# Patient Record
Sex: FEMALE | Race: WHITE | NOT HISPANIC OR LATINO
[De-identification: names, ages, dates, MRNs, and addresses within clinical notes are randomized per-mention and may not be internally consistent; named-entity substitution may affect disease eponyms.]

---

## 2017-03-08 ENCOUNTER — APPOINTMENT (OUTPATIENT)
Dept: NEUROLOGY | Facility: CLINIC | Age: 67
End: 2017-03-08

## 2017-03-08 VITALS
TEMPERATURE: 98.1 F | HEART RATE: 85 BPM | OXYGEN SATURATION: 93 % | DIASTOLIC BLOOD PRESSURE: 80 MMHG | BODY MASS INDEX: 29.57 KG/M2 | WEIGHT: 184 LBS | HEIGHT: 66 IN | SYSTOLIC BLOOD PRESSURE: 134 MMHG

## 2017-03-09 ENCOUNTER — TRANSCRIPTION ENCOUNTER (OUTPATIENT)
Age: 67
End: 2017-03-09

## 2017-05-17 ENCOUNTER — MEDICATION RENEWAL (OUTPATIENT)
Age: 67
End: 2017-05-17

## 2017-05-22 ENCOUNTER — OUTPATIENT (OUTPATIENT)
Dept: OUTPATIENT SERVICES | Facility: HOSPITAL | Age: 67
LOS: 1 days | End: 2017-05-22
Payer: MEDICARE

## 2017-05-22 PROCEDURE — 93880 EXTRACRANIAL BILAT STUDY: CPT | Mod: 26

## 2017-05-22 PROCEDURE — 93880 EXTRACRANIAL BILAT STUDY: CPT

## 2017-06-14 ENCOUNTER — APPOINTMENT (OUTPATIENT)
Dept: NEUROLOGY | Facility: CLINIC | Age: 67
End: 2017-06-14

## 2017-06-14 VITALS
OXYGEN SATURATION: 95 % | WEIGHT: 171 LBS | BODY MASS INDEX: 27.48 KG/M2 | DIASTOLIC BLOOD PRESSURE: 78 MMHG | HEART RATE: 72 BPM | TEMPERATURE: 98.4 F | SYSTOLIC BLOOD PRESSURE: 154 MMHG | HEIGHT: 66 IN

## 2017-06-14 VITALS — DIASTOLIC BLOOD PRESSURE: 79 MMHG | SYSTOLIC BLOOD PRESSURE: 126 MMHG

## 2017-06-14 DIAGNOSIS — M25.521 PAIN IN RIGHT ELBOW: ICD-10-CM

## 2017-08-10 ENCOUNTER — OUTPATIENT (OUTPATIENT)
Dept: OUTPATIENT SERVICES | Facility: HOSPITAL | Age: 67
LOS: 1 days | End: 2017-08-10
Payer: MEDICARE

## 2017-08-10 PROCEDURE — 93880 EXTRACRANIAL BILAT STUDY: CPT | Mod: 26

## 2017-08-10 PROCEDURE — 93880 EXTRACRANIAL BILAT STUDY: CPT

## 2017-08-22 ENCOUNTER — APPOINTMENT (OUTPATIENT)
Dept: NEUROLOGY | Facility: CLINIC | Age: 67
End: 2017-08-22

## 2017-09-07 ENCOUNTER — MEDICATION RENEWAL (OUTPATIENT)
Age: 67
End: 2017-09-07

## 2017-09-20 ENCOUNTER — APPOINTMENT (OUTPATIENT)
Dept: NEUROLOGY | Facility: CLINIC | Age: 67
End: 2017-09-20

## 2017-09-25 ENCOUNTER — APPOINTMENT (OUTPATIENT)
Dept: HEART AND VASCULAR | Facility: CLINIC | Age: 67
End: 2017-09-25
Payer: MEDICARE

## 2017-09-25 ENCOUNTER — MEDICATION RENEWAL (OUTPATIENT)
Age: 67
End: 2017-09-25

## 2017-09-25 VITALS
TEMPERATURE: 98.4 F | HEIGHT: 66 IN | OXYGEN SATURATION: 96 % | WEIGHT: 171.98 LBS | SYSTOLIC BLOOD PRESSURE: 134 MMHG | DIASTOLIC BLOOD PRESSURE: 76 MMHG | HEART RATE: 72 BPM | BODY MASS INDEX: 27.64 KG/M2

## 2017-09-25 DIAGNOSIS — M79.661 PAIN IN RIGHT LOWER LEG: ICD-10-CM

## 2017-09-25 PROCEDURE — 99213 OFFICE O/P EST LOW 20 MIN: CPT | Mod: 25

## 2017-09-30 ENCOUNTER — FORM ENCOUNTER (OUTPATIENT)
Age: 67
End: 2017-09-30

## 2017-10-01 ENCOUNTER — OUTPATIENT (OUTPATIENT)
Dept: OUTPATIENT SERVICES | Facility: HOSPITAL | Age: 67
LOS: 1 days | End: 2017-10-01
Payer: MEDICARE
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
## 2017-10-01 DIAGNOSIS — M79.661 PAIN IN RIGHT LOWER LEG: ICD-10-CM

## 2017-10-01 DIAGNOSIS — M79.662 PAIN IN LEFT LOWER LEG: ICD-10-CM

## 2017-10-01 PROCEDURE — 93970 EXTREMITY STUDY: CPT | Mod: 26

## 2017-10-01 PROCEDURE — 93970 EXTREMITY STUDY: CPT

## 2017-11-28 ENCOUNTER — APPOINTMENT (OUTPATIENT)
Dept: NEUROLOGY | Facility: CLINIC | Age: 67
End: 2017-11-28
Payer: MEDICARE

## 2017-11-28 VITALS
HEART RATE: 74 BPM | OXYGEN SATURATION: 95 % | HEIGHT: 66 IN | SYSTOLIC BLOOD PRESSURE: 149 MMHG | DIASTOLIC BLOOD PRESSURE: 66 MMHG | BODY MASS INDEX: 28.12 KG/M2 | WEIGHT: 175 LBS

## 2017-11-28 PROCEDURE — 99215 OFFICE O/P EST HI 40 MIN: CPT

## 2017-11-28 RX ORDER — GABAPENTIN 300 MG/1
300 CAPSULE ORAL TWICE DAILY
Qty: 60 | Refills: 1 | Status: DISCONTINUED | COMMUNITY
Start: 2017-06-14 | End: 2017-11-28

## 2017-12-18 ENCOUNTER — OUTPATIENT (OUTPATIENT)
Dept: OUTPATIENT SERVICES | Facility: HOSPITAL | Age: 67
LOS: 1 days | End: 2017-12-18
Payer: MEDICARE

## 2017-12-18 PROCEDURE — 93880 EXTRACRANIAL BILAT STUDY: CPT

## 2017-12-18 PROCEDURE — 93880 EXTRACRANIAL BILAT STUDY: CPT | Mod: 26

## 2018-02-16 ENCOUNTER — APPOINTMENT (OUTPATIENT)
Dept: PULMONOLOGY | Facility: CLINIC | Age: 68
End: 2018-02-16

## 2018-03-06 ENCOUNTER — APPOINTMENT (OUTPATIENT)
Dept: HEART AND VASCULAR | Facility: CLINIC | Age: 68
End: 2018-03-06
Payer: MEDICARE

## 2018-03-06 VITALS
HEIGHT: 66 IN | WEIGHT: 176 LBS | BODY MASS INDEX: 28.28 KG/M2 | SYSTOLIC BLOOD PRESSURE: 120 MMHG | TEMPERATURE: 97.8 F | HEART RATE: 78 BPM | OXYGEN SATURATION: 98 % | DIASTOLIC BLOOD PRESSURE: 50 MMHG

## 2018-03-06 PROCEDURE — 93000 ELECTROCARDIOGRAM COMPLETE: CPT

## 2018-03-06 PROCEDURE — 99214 OFFICE O/P EST MOD 30 MIN: CPT | Mod: 25

## 2018-03-06 RX ORDER — GABAPENTIN 300 MG/1
300 CAPSULE ORAL 3 TIMES DAILY
Qty: 270 | Refills: 1 | Status: DISCONTINUED | COMMUNITY
Start: 2017-11-28 | End: 2018-03-06

## 2018-03-29 ENCOUNTER — APPOINTMENT (OUTPATIENT)
Dept: NEUROLOGY | Facility: CLINIC | Age: 68
End: 2018-03-29

## 2018-04-02 ENCOUNTER — APPOINTMENT (OUTPATIENT)
Dept: INTERNAL MEDICINE | Facility: CLINIC | Age: 68
End: 2018-04-02
Payer: MEDICARE

## 2018-04-02 VITALS
WEIGHT: 176 LBS | SYSTOLIC BLOOD PRESSURE: 137 MMHG | HEART RATE: 80 BPM | OXYGEN SATURATION: 97 % | DIASTOLIC BLOOD PRESSURE: 84 MMHG | BODY MASS INDEX: 28.28 KG/M2 | TEMPERATURE: 98 F | HEIGHT: 66 IN

## 2018-04-02 DIAGNOSIS — Z78.9 OTHER SPECIFIED HEALTH STATUS: ICD-10-CM

## 2018-04-02 DIAGNOSIS — Z87.891 PERSONAL HISTORY OF NICOTINE DEPENDENCE: ICD-10-CM

## 2018-04-02 DIAGNOSIS — Z12.83 ENCOUNTER FOR SCREENING FOR MALIGNANT NEOPLASM OF SKIN: ICD-10-CM

## 2018-04-02 DIAGNOSIS — Z80.3 FAMILY HISTORY OF MALIGNANT NEOPLASM OF BREAST: ICD-10-CM

## 2018-04-02 DIAGNOSIS — Z80.0 FAMILY HISTORY OF MALIGNANT NEOPLASM OF DIGESTIVE ORGANS: ICD-10-CM

## 2018-04-02 DIAGNOSIS — Z11.3 ENCOUNTER FOR SCREENING FOR INFECTIONS WITH A PREDOMINANTLY SEXUAL MODE OF TRANSMISSION: ICD-10-CM

## 2018-04-02 PROCEDURE — 36415 COLL VENOUS BLD VENIPUNCTURE: CPT

## 2018-04-02 PROCEDURE — 99204 OFFICE O/P NEW MOD 45 MIN: CPT | Mod: 25

## 2018-04-02 PROCEDURE — G0444 DEPRESSION SCREEN ANNUAL: CPT | Mod: 59

## 2018-04-02 PROCEDURE — 99406 BEHAV CHNG SMOKING 3-10 MIN: CPT

## 2018-04-03 LAB
ALBUMIN SERPL ELPH-MCNC: 4.4 G/DL
ALP BLD-CCNC: 73 U/L
ALT SERPL-CCNC: 18 U/L
ANION GAP SERPL CALC-SCNC: 16 MMOL/L
AST SERPL-CCNC: 20 U/L
BASOPHILS # BLD AUTO: 0.03 K/UL
BASOPHILS NFR BLD AUTO: 0.4 %
BILIRUB SERPL-MCNC: 0.8 MG/DL
BUN SERPL-MCNC: 14 MG/DL
CALCIUM SERPL-MCNC: 10 MG/DL
CHLORIDE SERPL-SCNC: 96 MMOL/L
CHOLEST SERPL-MCNC: 156 MG/DL
CHOLEST/HDLC SERPL: 3 RATIO
CO2 SERPL-SCNC: 28 MMOL/L
CREAT SERPL-MCNC: 0.54 MG/DL
EOSINOPHIL # BLD AUTO: 0.12 K/UL
EOSINOPHIL NFR BLD AUTO: 1.4 %
FOLATE SERPL-MCNC: 18.1 NG/ML
GLUCOSE SERPL-MCNC: 89 MG/DL
HAV IGG+IGM SER QL: NONREACTIVE
HBA1C MFR BLD HPLC: 5.6 %
HBV CORE IGG+IGM SER QL: NONREACTIVE
HBV SURFACE AB SER QL: NONREACTIVE
HBV SURFACE AG SER QL: NONREACTIVE
HCT VFR BLD CALC: 45.6 %
HCV AB SER QL: NONREACTIVE
HCV S/CO RATIO: 0.16 S/CO
HDLC SERPL-MCNC: 52 MG/DL
HGB BLD-MCNC: 14.8 G/DL
HIV1+2 AB SPEC QL IA.RAPID: NONREACTIVE
IMM GRANULOCYTES NFR BLD AUTO: 0.1 %
LDLC SERPL CALC-MCNC: 86 MG/DL
LYMPHOCYTES # BLD AUTO: 2.29 K/UL
LYMPHOCYTES NFR BLD AUTO: 27.5 %
MAN DIFF?: NORMAL
MCHC RBC-ENTMCNC: 29.4 PG
MCHC RBC-ENTMCNC: 32.5 GM/DL
MCV RBC AUTO: 90.5 FL
MONOCYTES # BLD AUTO: 0.45 K/UL
MONOCYTES NFR BLD AUTO: 5.4 %
NEUTROPHILS # BLD AUTO: 5.43 K/UL
NEUTROPHILS NFR BLD AUTO: 65.2 %
PLATELET # BLD AUTO: 246 K/UL
POTASSIUM SERPL-SCNC: 4.2 MMOL/L
PROT SERPL-MCNC: 7.4 G/DL
RBC # BLD: 5.04 M/UL
RBC # FLD: 15.6 %
SODIUM SERPL-SCNC: 140 MMOL/L
T PALLIDUM AB SER QL IA: NEGATIVE
TRIGL SERPL-MCNC: 92 MG/DL
TSH SERPL-ACNC: 2.17 UIU/ML
VIT B12 SERPL-MCNC: 536 PG/ML
WBC # FLD AUTO: 8.33 K/UL

## 2018-04-17 ENCOUNTER — RX RENEWAL (OUTPATIENT)
Age: 68
End: 2018-04-17

## 2018-04-18 ENCOUNTER — APPOINTMENT (OUTPATIENT)
Dept: NEUROLOGY | Facility: CLINIC | Age: 68
End: 2018-04-18

## 2018-05-07 ENCOUNTER — APPOINTMENT (OUTPATIENT)
Dept: INTERNAL MEDICINE | Facility: CLINIC | Age: 68
End: 2018-05-07
Payer: MEDICARE

## 2018-05-07 VITALS
BODY MASS INDEX: 28.28 KG/M2 | DIASTOLIC BLOOD PRESSURE: 75 MMHG | RESPIRATION RATE: 15 BRPM | TEMPERATURE: 97.7 F | HEIGHT: 66 IN | SYSTOLIC BLOOD PRESSURE: 141 MMHG | HEART RATE: 76 BPM | OXYGEN SATURATION: 98 % | WEIGHT: 176 LBS

## 2018-05-07 PROCEDURE — 99214 OFFICE O/P EST MOD 30 MIN: CPT | Mod: 25

## 2018-05-07 PROCEDURE — 99406 BEHAV CHNG SMOKING 3-10 MIN: CPT

## 2018-05-07 RX ORDER — LORAZEPAM 1 MG/1
1 TABLET ORAL
Refills: 0 | Status: DISCONTINUED | COMMUNITY
End: 2018-05-07

## 2018-05-08 LAB
CREAT SPEC-SCNC: 78 MG/DL
MICROALBUMIN 24H UR DL<=1MG/L-MCNC: 0.6 MG/DL
MICROALBUMIN/CREAT 24H UR-RTO: 8 MG/G

## 2018-05-12 ENCOUNTER — MOBILE ON CALL (OUTPATIENT)
Age: 68
End: 2018-05-12

## 2018-06-20 ENCOUNTER — APPOINTMENT (OUTPATIENT)
Dept: HEART AND VASCULAR | Facility: CLINIC | Age: 68
End: 2018-06-20
Payer: MEDICARE

## 2018-06-20 VITALS
HEART RATE: 75 BPM | OXYGEN SATURATION: 97 % | TEMPERATURE: 97.2 F | BODY MASS INDEX: 28.28 KG/M2 | WEIGHT: 176 LBS | DIASTOLIC BLOOD PRESSURE: 80 MMHG | SYSTOLIC BLOOD PRESSURE: 140 MMHG | HEIGHT: 66 IN

## 2018-06-20 DIAGNOSIS — R00.2 PALPITATIONS: ICD-10-CM

## 2018-06-20 PROCEDURE — 99214 OFFICE O/P EST MOD 30 MIN: CPT | Mod: 25

## 2018-06-20 PROCEDURE — 93000 ELECTROCARDIOGRAM COMPLETE: CPT

## 2018-06-20 RX ORDER — BEVACIZUMAB 100 MG/4ML
100 INJECTION, SOLUTION INTRAVENOUS
Refills: 0 | Status: DISCONTINUED | COMMUNITY
End: 2018-06-20

## 2018-06-20 RX ORDER — LOSARTAN POTASSIUM 25 MG/1
25 TABLET, FILM COATED ORAL
Qty: 30 | Refills: 0 | Status: DISCONTINUED | COMMUNITY
Start: 2017-12-12 | End: 2018-06-20

## 2018-06-20 RX ORDER — LOSARTAN POTASSIUM AND HYDROCHLOROTHIAZIDE 12.5; 5 MG/1; MG/1
50-12.5 TABLET ORAL
Qty: 90 | Refills: 0 | Status: DISCONTINUED | COMMUNITY
Start: 2018-02-01 | End: 2018-06-20

## 2018-06-21 ENCOUNTER — RX RENEWAL (OUTPATIENT)
Age: 68
End: 2018-06-21

## 2018-06-27 ENCOUNTER — APPOINTMENT (OUTPATIENT)
Dept: HEART AND VASCULAR | Facility: CLINIC | Age: 68
End: 2018-06-27

## 2018-07-02 ENCOUNTER — APPOINTMENT (OUTPATIENT)
Dept: HEART AND VASCULAR | Facility: CLINIC | Age: 68
End: 2018-07-02

## 2018-07-23 ENCOUNTER — APPOINTMENT (OUTPATIENT)
Dept: HEART AND VASCULAR | Facility: CLINIC | Age: 68
End: 2018-07-23
Payer: MEDICARE

## 2018-07-23 PROCEDURE — 93306 TTE W/DOPPLER COMPLETE: CPT

## 2018-08-08 ENCOUNTER — APPOINTMENT (OUTPATIENT)
Dept: HEART AND VASCULAR | Facility: CLINIC | Age: 68
End: 2018-08-08
Payer: MEDICARE

## 2018-08-08 VITALS
WEIGHT: 176 LBS | BODY MASS INDEX: 28.28 KG/M2 | HEART RATE: 75 BPM | SYSTOLIC BLOOD PRESSURE: 130 MMHG | DIASTOLIC BLOOD PRESSURE: 60 MMHG | TEMPERATURE: 98.4 F | HEIGHT: 66 IN | OXYGEN SATURATION: 96 %

## 2018-08-08 PROCEDURE — 99214 OFFICE O/P EST MOD 30 MIN: CPT | Mod: 25

## 2018-08-08 PROCEDURE — 36415 COLL VENOUS BLD VENIPUNCTURE: CPT

## 2018-08-27 ENCOUNTER — APPOINTMENT (OUTPATIENT)
Dept: NEUROLOGY | Facility: CLINIC | Age: 68
End: 2018-08-27
Payer: MEDICARE

## 2018-08-27 VITALS
TEMPERATURE: 98.6 F | DIASTOLIC BLOOD PRESSURE: 76 MMHG | BODY MASS INDEX: 28.12 KG/M2 | HEART RATE: 69 BPM | WEIGHT: 175 LBS | HEIGHT: 66 IN | OXYGEN SATURATION: 94 % | SYSTOLIC BLOOD PRESSURE: 132 MMHG

## 2018-08-27 PROCEDURE — 99214 OFFICE O/P EST MOD 30 MIN: CPT

## 2018-09-14 ENCOUNTER — APPOINTMENT (OUTPATIENT)
Dept: OTOLARYNGOLOGY | Facility: CLINIC | Age: 68
End: 2018-09-14
Payer: MEDICARE

## 2018-09-14 VITALS
HEART RATE: 73 BPM | HEIGHT: 66 IN | TEMPERATURE: 98.4 F | SYSTOLIC BLOOD PRESSURE: 143 MMHG | DIASTOLIC BLOOD PRESSURE: 85 MMHG | BODY MASS INDEX: 27.97 KG/M2 | WEIGHT: 174 LBS | OXYGEN SATURATION: 98 %

## 2018-09-14 DIAGNOSIS — H91.93 UNSPECIFIED HEARING LOSS, BILATERAL: ICD-10-CM

## 2018-09-14 PROCEDURE — 92550 TYMPANOMETRY & REFLEX THRESH: CPT

## 2018-09-14 PROCEDURE — 92557 COMPREHENSIVE HEARING TEST: CPT

## 2018-09-14 PROCEDURE — 99203 OFFICE O/P NEW LOW 30 MIN: CPT

## 2018-09-17 ENCOUNTER — LABORATORY RESULT (OUTPATIENT)
Age: 68
End: 2018-09-17

## 2018-09-17 ENCOUNTER — APPOINTMENT (OUTPATIENT)
Dept: DERMATOLOGY | Facility: CLINIC | Age: 68
End: 2018-09-17
Payer: MEDICARE

## 2018-09-17 VITALS
WEIGHT: 179 LBS | BODY MASS INDEX: 28.77 KG/M2 | DIASTOLIC BLOOD PRESSURE: 76 MMHG | HEIGHT: 66 IN | SYSTOLIC BLOOD PRESSURE: 155 MMHG

## 2018-09-17 DIAGNOSIS — Z80.8 FAMILY HISTORY OF MALIGNANT NEOPLASM OF OTHER ORGANS OR SYSTEMS: ICD-10-CM

## 2018-09-17 DIAGNOSIS — L82.1 OTHER SEBORRHEIC KERATOSIS: ICD-10-CM

## 2018-09-17 DIAGNOSIS — Z12.83 ENCOUNTER FOR SCREENING FOR MALIGNANT NEOPLASM OF SKIN: ICD-10-CM

## 2018-09-17 DIAGNOSIS — Z86.79 PERSONAL HISTORY OF OTHER DISEASES OF THE CIRCULATORY SYSTEM: ICD-10-CM

## 2018-09-17 DIAGNOSIS — Z85.828 PERSONAL HISTORY OF OTHER MALIGNANT NEOPLASM OF SKIN: ICD-10-CM

## 2018-09-17 DIAGNOSIS — D22.9 MELANOCYTIC NEVI, UNSPECIFIED: ICD-10-CM

## 2018-09-17 DIAGNOSIS — Z87.39 PERSONAL HISTORY OF OTHER DISEASES OF THE MUSCULOSKELETAL SYSTEM AND CONNECTIVE TISSUE: ICD-10-CM

## 2018-09-17 DIAGNOSIS — D48.5 NEOPLASM OF UNCERTAIN BEHAVIOR OF SKIN: ICD-10-CM

## 2018-09-17 DIAGNOSIS — H91.90 UNSPECIFIED HEARING LOSS, UNSPECIFIED EAR: ICD-10-CM

## 2018-09-17 DIAGNOSIS — H54.7 UNSPECIFIED VISUAL LOSS: ICD-10-CM

## 2018-09-17 DIAGNOSIS — Z80.9 FAMILY HISTORY OF MALIGNANT NEOPLASM, UNSPECIFIED: ICD-10-CM

## 2018-09-17 PROCEDURE — 11100 BX SKIN SUBCUTANEOUS&/MUCOUS MEMBRANE 1 LESION: CPT

## 2018-09-17 PROCEDURE — 99204 OFFICE O/P NEW MOD 45 MIN: CPT

## 2018-09-17 PROCEDURE — 11101 BIOPSY SKIN SUBQ&/MUCOUS MEMBRANE EA ADDL LESN: CPT

## 2018-10-03 ENCOUNTER — APPOINTMENT (OUTPATIENT)
Dept: HEART AND VASCULAR | Facility: CLINIC | Age: 68
End: 2018-10-03
Payer: MEDICARE

## 2018-10-03 VITALS
OXYGEN SATURATION: 97 % | HEIGHT: 66 IN | SYSTOLIC BLOOD PRESSURE: 136 MMHG | BODY MASS INDEX: 28.93 KG/M2 | WEIGHT: 179.99 LBS | DIASTOLIC BLOOD PRESSURE: 72 MMHG | TEMPERATURE: 97.8 F | HEART RATE: 85 BPM

## 2018-10-03 DIAGNOSIS — Z86.79 PERSONAL HISTORY OF OTHER DISEASES OF THE CIRCULATORY SYSTEM: ICD-10-CM

## 2018-10-03 PROCEDURE — 99214 OFFICE O/P EST MOD 30 MIN: CPT

## 2018-10-03 PROCEDURE — 93000 ELECTROCARDIOGRAM COMPLETE: CPT

## 2018-10-03 RX ORDER — BUPROPION HYDROCHLORIDE 150 MG/1
150 TABLET, FILM COATED, EXTENDED RELEASE ORAL
Qty: 90 | Refills: 3 | Status: DISCONTINUED | COMMUNITY
Start: 2018-06-20 | End: 2018-10-03

## 2018-10-15 ENCOUNTER — APPOINTMENT (OUTPATIENT)
Dept: INTERNAL MEDICINE | Facility: CLINIC | Age: 68
End: 2018-10-15
Payer: MEDICARE

## 2018-10-15 VITALS
BODY MASS INDEX: 28.77 KG/M2 | WEIGHT: 179 LBS | DIASTOLIC BLOOD PRESSURE: 66 MMHG | TEMPERATURE: 97.8 F | SYSTOLIC BLOOD PRESSURE: 118 MMHG | HEIGHT: 66 IN | HEART RATE: 77 BPM | OXYGEN SATURATION: 95 %

## 2018-10-15 DIAGNOSIS — Z72.0 TOBACCO USE: ICD-10-CM

## 2018-10-15 PROCEDURE — 99406 BEHAV CHNG SMOKING 3-10 MIN: CPT

## 2018-10-15 PROCEDURE — 99214 OFFICE O/P EST MOD 30 MIN: CPT | Mod: 25

## 2018-11-04 ENCOUNTER — RX RENEWAL (OUTPATIENT)
Age: 68
End: 2018-11-04

## 2018-11-05 ENCOUNTER — RX RENEWAL (OUTPATIENT)
Age: 68
End: 2018-11-05

## 2018-11-25 ENCOUNTER — FORM ENCOUNTER (OUTPATIENT)
Age: 68
End: 2018-11-25

## 2018-11-26 ENCOUNTER — APPOINTMENT (OUTPATIENT)
Dept: ULTRASOUND IMAGING | Facility: HOSPITAL | Age: 68
End: 2018-11-26
Payer: MEDICARE

## 2018-11-26 ENCOUNTER — OUTPATIENT (OUTPATIENT)
Dept: OUTPATIENT SERVICES | Facility: HOSPITAL | Age: 68
LOS: 1 days | End: 2018-11-26
Payer: MEDICARE

## 2018-11-26 PROCEDURE — 93880 EXTRACRANIAL BILAT STUDY: CPT

## 2018-11-26 PROCEDURE — 93880 EXTRACRANIAL BILAT STUDY: CPT | Mod: 26

## 2018-12-06 ENCOUNTER — APPOINTMENT (OUTPATIENT)
Dept: INTERNAL MEDICINE | Facility: CLINIC | Age: 68
End: 2018-12-06

## 2019-01-02 ENCOUNTER — RX RENEWAL (OUTPATIENT)
Age: 69
End: 2019-01-02

## 2019-01-03 ENCOUNTER — MEDICATION RENEWAL (OUTPATIENT)
Age: 69
End: 2019-01-03

## 2019-01-04 ENCOUNTER — MEDICATION RENEWAL (OUTPATIENT)
Age: 69
End: 2019-01-04

## 2019-01-04 ENCOUNTER — RX RENEWAL (OUTPATIENT)
Age: 69
End: 2019-01-04

## 2019-01-07 ENCOUNTER — APPOINTMENT (OUTPATIENT)
Dept: INTERNAL MEDICINE | Facility: CLINIC | Age: 69
End: 2019-01-07
Payer: MEDICARE

## 2019-01-07 VITALS
TEMPERATURE: 97.6 F | HEART RATE: 89 BPM | SYSTOLIC BLOOD PRESSURE: 150 MMHG | BODY MASS INDEX: 29.25 KG/M2 | HEIGHT: 66 IN | OXYGEN SATURATION: 96 % | WEIGHT: 182 LBS | DIASTOLIC BLOOD PRESSURE: 75 MMHG

## 2019-01-07 VITALS — DIASTOLIC BLOOD PRESSURE: 74 MMHG | SYSTOLIC BLOOD PRESSURE: 152 MMHG

## 2019-01-07 DIAGNOSIS — Z12.2 ENCOUNTER FOR SCREENING FOR MALIGNANT NEOPLASM OF RESPIRATORY ORGANS: ICD-10-CM

## 2019-01-07 DIAGNOSIS — Z23 ENCOUNTER FOR IMMUNIZATION: ICD-10-CM

## 2019-01-07 DIAGNOSIS — Z13.31 ENCOUNTER FOR SCREENING FOR DEPRESSION: ICD-10-CM

## 2019-01-07 PROCEDURE — 36415 COLL VENOUS BLD VENIPUNCTURE: CPT

## 2019-01-07 PROCEDURE — G0444 DEPRESSION SCREEN ANNUAL: CPT | Mod: 59

## 2019-01-07 PROCEDURE — 99214 OFFICE O/P EST MOD 30 MIN: CPT | Mod: 25

## 2019-01-07 PROCEDURE — 90670 PCV13 VACCINE IM: CPT

## 2019-01-07 PROCEDURE — G0009: CPT

## 2019-01-07 RX ORDER — LORAZEPAM 1 MG/1
1 TABLET ORAL
Qty: 30 | Refills: 0 | Status: DISCONTINUED | COMMUNITY
Start: 2018-04-17 | End: 2019-01-07

## 2019-01-08 ENCOUNTER — TRANSCRIPTION ENCOUNTER (OUTPATIENT)
Age: 69
End: 2019-01-08

## 2019-01-08 LAB
ALBUMIN SERPL ELPH-MCNC: 4.4 G/DL
ALP BLD-CCNC: 82 U/L
ALT SERPL-CCNC: 19 U/L
ANION GAP SERPL CALC-SCNC: 13 MMOL/L
AST SERPL-CCNC: 15 U/L
BASOPHILS # BLD AUTO: 0.02 K/UL
BASOPHILS NFR BLD AUTO: 0.2 %
BILIRUB SERPL-MCNC: 0.7 MG/DL
BUN SERPL-MCNC: 14 MG/DL
CALCIUM SERPL-MCNC: 10 MG/DL
CHLORIDE SERPL-SCNC: 102 MMOL/L
CO2 SERPL-SCNC: 27 MMOL/L
CREAT SERPL-MCNC: 0.64 MG/DL
EOSINOPHIL # BLD AUTO: 0.15 K/UL
EOSINOPHIL NFR BLD AUTO: 1.8 %
GLUCOSE SERPL-MCNC: 181 MG/DL
HCT VFR BLD CALC: 46.4 %
HGB BLD-MCNC: 14.2 G/DL
IMM GRANULOCYTES NFR BLD AUTO: 0.2 %
LYMPHOCYTES # BLD AUTO: 1.71 K/UL
LYMPHOCYTES NFR BLD AUTO: 21 %
MAN DIFF?: NORMAL
MCHC RBC-ENTMCNC: 28.7 PG
MCHC RBC-ENTMCNC: 30.6 GM/DL
MCV RBC AUTO: 93.9 FL
MONOCYTES # BLD AUTO: 0.28 K/UL
MONOCYTES NFR BLD AUTO: 3.4 %
NEUTROPHILS # BLD AUTO: 5.96 K/UL
NEUTROPHILS NFR BLD AUTO: 73.4 %
PLATELET # BLD AUTO: 245 K/UL
POTASSIUM SERPL-SCNC: 3.9 MMOL/L
PROT SERPL-MCNC: 6.8 G/DL
RBC # BLD: 4.94 M/UL
RBC # FLD: 15.8 %
SODIUM SERPL-SCNC: 142 MMOL/L
WBC # FLD AUTO: 8.14 K/UL

## 2019-01-15 ENCOUNTER — LABORATORY RESULT (OUTPATIENT)
Age: 69
End: 2019-01-15

## 2019-01-15 ENCOUNTER — APPOINTMENT (OUTPATIENT)
Dept: NEUROLOGY | Facility: CLINIC | Age: 69
End: 2019-01-15
Payer: MEDICARE

## 2019-01-15 VITALS
SYSTOLIC BLOOD PRESSURE: 164 MMHG | HEIGHT: 66 IN | WEIGHT: 182 LBS | HEART RATE: 72 BPM | OXYGEN SATURATION: 95 % | BODY MASS INDEX: 29.25 KG/M2 | TEMPERATURE: 98.4 F | DIASTOLIC BLOOD PRESSURE: 89 MMHG

## 2019-01-15 DIAGNOSIS — M54.2 CERVICALGIA: ICD-10-CM

## 2019-01-15 PROCEDURE — 99214 OFFICE O/P EST MOD 30 MIN: CPT

## 2019-01-15 RX ORDER — NICOTINE 21 MG/24HR
14 PATCH, TRANSDERMAL 24 HOURS TRANSDERMAL
Qty: 42 | Refills: 0 | Status: DISCONTINUED | COMMUNITY
Start: 2018-06-20 | End: 2019-01-15

## 2019-01-15 RX ORDER — ALBUTEROL SULFATE 90 UG/1
108 (90 BASE) AEROSOL, METERED RESPIRATORY (INHALATION)
Qty: 1 | Refills: 2 | Status: DISCONTINUED | COMMUNITY
Start: 2018-10-15 | End: 2019-01-15

## 2019-01-15 NOTE — PHYSICAL EXAM
[FreeTextEntry1] : General: sitting on exam table comfortably, NAD\par Neck: flexion/extension 5/5,  reproducible pain on palpation of back of her neck, decreased ROM of her neck (can't extend past midline due to pain)\par CV: RRR\par Extremities: FROMx4 including both shoulders, 2+ radial pulses bilaterally\par 	\par Neurological exam:\par Mental status: AA&O x 3, fluent, no dysarthria, follows all commands, able give full history of present and past events, normal attention span\par Cranial nerves: EOMI, VFF, no nystagmus, facial sensation intact, no facial droop, shoulder shrug intact bilaterally, tongue midline\par Motor: No pronator drift, 5/5 x4 except for right hand  4+/5 though interossei and fine finger movements 5/5, normal bulk and tone\par Sensory: Intact light touch bilaterally \par Reflexes: right 3+, left 2++\par Cerebellar: FNF intact bilaterally\par Gait: steady

## 2019-01-15 NOTE — ASSESSMENT
[FreeTextEntry1] : 68 year-old female with PMH HTN, prediabetes presents for follow up for gait instability secondary to left vertebral dissection and neck pain.  \par - Her gait has improved.  She has some worsening of her stroke risk factors.\par - The neck pain with associated left arm pain has worsened intermittently since her last visit.    \par \par Plan for left cerebellar stroke secondary to left vertebral dissection:\par 1) Secondary stroke prevention - needs better control\par a) Continue Plavix 75mg for antiplatelet with addition of Aspirin given worsening carotid stenosis.  \par - Ordered accumetrics to check that Plavix is in therapeutic range.\par b) Continue Atorvastatin 40mg for carotid stenosis, though may need to consider increasing the dose depending on repeat lipid profile.  LDL goal is less than 70 given her risk factors.\par \par 2) Risk factor management - \par a) HTN - needs better control.  Suggested that she have the Nurse at gym measure her blood pressure at least once a week for review by Dr. Reyes, her Cardiologist.\par b) Hyperlipidemia - see above\par c) Prediabetes - ordered repeat hemoglobin A1C for comparison.\par d) Carotid stenosis - worsening left ICA stenosis so needs maximal medical care with Aspirin, Plavix and Atorvastatin.\par e) Smoking - Encouraged continued smoking cessation. \par \par Plan for Neck pain - components of cervical radiculopathy/cervicogenic headaches.\par a) Agree with referral to pain management though patient concerned about epidural injection in cervical region.\par b) Prescribed muscle relaxant, Tizanidine 2mg daily for initial treatment.\par c) Encouraged her to exercise more to increase mobility of her neck muscles.

## 2019-01-15 NOTE — REVIEW OF SYSTEMS
[Anxiety] : anxiety [As Noted in HPI] : as noted in HPI [Negative] : Heme/Lymph [de-identified] : Restless legs at night

## 2019-01-15 NOTE — DATA REVIEWED
[de-identified] : Carotid dopplers (11/26/2018):\par 1. Since 12/18/2017, now abnormal peak systolic and end-diastolic velocities within the LEFT internal carotid artery consistent with 50-69% stenosis of the normal luminal diameter.\par 2. There is again evidence of occlusion of distal portion of left vertebral artery.\par  [de-identified] : Labs (1/7/2019): \par CBC, CMP - WNL except glucose 181

## 2019-01-15 NOTE — HISTORY OF PRESENT ILLNESS
[FreeTextEntry1] : 68 year-old female presents for follow up for stroke secondary to left vertebral dissection and neck pain. No new weakness, numbness, speech or visual deficits.    \par \par Plavix - No bleeding in urine or stool.\par Atorvastatin - No muscle cramps or pains.  \par \par Stroke risk factors - \par HTN - Blood pressure elevated today.  She's not measuring her blood pressure anymore since doesn't have confidence in her machine.  She follows with Dr. Reyes, Cardiology, next month.\par Hyperlipidemia - see above\par Prediabetes - She joined a gym to lose weight.  She's concerned that her glucose on recent testing was high.\par Carotid stenosis - repeat carotid dopplers in November\par Smoking - 3 days off cigarettes - She stopped cold.  She thinks that she feels depressed in the past 3 days.\par \par Neck pain - She's still getting headaches and neck pain.  She had severe pain around left shoulder and arm around Thanksgiving.  Her PMD referred her to pain management.\par - She tried the Gabapentin for longer period but discontinued due to feeling of whoozy.

## 2019-01-24 ENCOUNTER — FORM ENCOUNTER (OUTPATIENT)
Age: 69
End: 2019-01-24

## 2019-01-25 ENCOUNTER — APPOINTMENT (OUTPATIENT)
Dept: CT IMAGING | Facility: HOSPITAL | Age: 69
End: 2019-01-25

## 2019-01-25 ENCOUNTER — APPOINTMENT (OUTPATIENT)
Dept: PULMONOLOGY | Facility: CLINIC | Age: 69
End: 2019-01-25
Payer: MEDICARE

## 2019-01-25 ENCOUNTER — OUTPATIENT (OUTPATIENT)
Dept: OUTPATIENT SERVICES | Facility: HOSPITAL | Age: 69
LOS: 1 days | End: 2019-01-25
Payer: MEDICARE

## 2019-01-25 VITALS
HEART RATE: 80 BPM | HEIGHT: 66 IN | SYSTOLIC BLOOD PRESSURE: 122 MMHG | WEIGHT: 180 LBS | BODY MASS INDEX: 28.93 KG/M2 | OXYGEN SATURATION: 96 % | DIASTOLIC BLOOD PRESSURE: 70 MMHG

## 2019-01-25 PROCEDURE — G0297: CPT

## 2019-01-25 PROCEDURE — G0297: CPT | Mod: 26

## 2019-01-25 PROCEDURE — 99406 BEHAV CHNG SMOKING 3-10 MIN: CPT

## 2019-01-25 PROCEDURE — G0296 VISIT TO DETERM LDCT ELIG: CPT

## 2019-01-28 ENCOUNTER — RESULT REVIEW (OUTPATIENT)
Age: 69
End: 2019-01-28

## 2019-01-28 LAB
CHOLEST SERPL-MCNC: 132 MG/DL
CHOLEST/HDLC SERPL: 3 RATIO
HBA1C MFR BLD HPLC: 5.6 %
HDLC SERPL-MCNC: 44 MG/DL
LDLC SERPL CALC-MCNC: 59 MG/DL
TRIGL SERPL-MCNC: 147 MG/DL

## 2019-01-29 ENCOUNTER — RESULT REVIEW (OUTPATIENT)
Age: 69
End: 2019-01-29

## 2019-01-31 ENCOUNTER — APPOINTMENT (OUTPATIENT)
Dept: ULTRASOUND IMAGING | Facility: HOSPITAL | Age: 69
End: 2019-01-31

## 2019-02-05 ENCOUNTER — APPOINTMENT (OUTPATIENT)
Dept: HEART AND VASCULAR | Facility: CLINIC | Age: 69
End: 2019-02-05
Payer: MEDICARE

## 2019-02-05 VITALS — DIASTOLIC BLOOD PRESSURE: 60 MMHG | SYSTOLIC BLOOD PRESSURE: 130 MMHG

## 2019-02-05 VITALS
OXYGEN SATURATION: 98 % | HEART RATE: 75 BPM | WEIGHT: 181 LBS | HEIGHT: 66 IN | BODY MASS INDEX: 29.09 KG/M2 | SYSTOLIC BLOOD PRESSURE: 154 MMHG | TEMPERATURE: 98 F | DIASTOLIC BLOOD PRESSURE: 72 MMHG

## 2019-02-05 PROCEDURE — 93000 ELECTROCARDIOGRAM COMPLETE: CPT

## 2019-02-05 NOTE — PHYSICAL EXAM
[General Appearance - Well Developed] : well developed [Normal Appearance] : normal appearance [Well Groomed] : well groomed [General Appearance - Well Nourished] : well nourished [No Deformities] : no deformities [General Appearance - In No Acute Distress] : no acute distress [Normal Conjunctiva] : the conjunctiva exhibited no abnormalities [Eyelids - No Xanthelasma] : the eyelids demonstrated no xanthelasmas [Normal Oral Mucosa] : normal oral mucosa [No Oral Pallor] : no oral pallor [No Oral Cyanosis] : no oral cyanosis [Normal Jugular Venous A Waves Present] : normal jugular venous A waves present [Normal Jugular Venous V Waves Present] : normal jugular venous V waves present [No Jugular Venous Burks A Waves] : no jugular venous burks A waves [Respiration, Rhythm And Depth] : normal respiratory rhythm and effort [Exaggerated Use Of Accessory Muscles For Inspiration] : no accessory muscle use [Auscultation Breath Sounds / Voice Sounds] : lungs were clear to auscultation bilaterally [Heart Rate And Rhythm] : heart rate and rhythm were normal [Heart Sounds] : normal S1 and S2 [Murmurs] : no murmurs present [Abdomen Soft] : soft [Abdomen Tenderness] : non-tender [Abdomen Mass (___ Cm)] : no abdominal mass palpated [Abnormal Walk] : normal gait [Gait - Sufficient For Exercise Testing] : the gait was sufficient for exercise testing [Nail Clubbing] : no clubbing of the fingernails [Cyanosis, Localized] : no localized cyanosis [Petechial Hemorrhages (___cm)] : no petechial hemorrhages [Skin Color & Pigmentation] : normal skin color and pigmentation [] : no rash [No Venous Stasis] : no venous stasis [Skin Lesions] : no skin lesions [No Skin Ulcers] : no skin ulcer [No Xanthoma] : no  xanthoma was observed [Oriented To Time, Place, And Person] : oriented to person, place, and time [Affect] : the affect was normal [Mood] : the mood was normal [No Anxiety] : not feeling anxious [FreeTextEntry1] : systolic murmur

## 2019-02-05 NOTE — ASSESSMENT
[FreeTextEntry1] : htn bp tdoay at goal bp at home is very labile no orthostasis on exam will do ambulatory blood pressure monitoring\par progressive carotid stenosis increase lipitor to 80 on asa plavix\par smoking cessation stopped smoking may have ILD\par asymetric septal hypertrophy no outflow gradient continue current meds hydration no further work up\par fu in four months

## 2019-02-05 NOTE — HISTORY OF PRESENT ILLNESS
[FreeTextEntry1] : 67 f with tia htn carotid stenosis ccta 2016 normal coronaries echo asymmetric septal hypertrophy with dynamic outflow tract obstruction peak gradient 47 resting 14 mmHg. negative genetic testing ILD  has no shortness of breath only quick episodes of chest pain at rest palpitations which occurs when she lies down \par \par ecg nsr lvh nsst changes

## 2019-02-08 ENCOUNTER — RX CHANGE (OUTPATIENT)
Age: 69
End: 2019-02-08

## 2019-02-08 ENCOUNTER — RX RENEWAL (OUTPATIENT)
Age: 69
End: 2019-02-08

## 2019-02-08 RX ORDER — LOSARTAN POTASSIUM 100 MG/1
100 TABLET, FILM COATED ORAL
Qty: 90 | Refills: 0 | Status: DISCONTINUED | COMMUNITY
Start: 2017-12-12 | End: 2019-02-08

## 2019-02-12 ENCOUNTER — MEDICATION RENEWAL (OUTPATIENT)
Age: 69
End: 2019-02-12

## 2019-02-12 ENCOUNTER — RX RENEWAL (OUTPATIENT)
Age: 69
End: 2019-02-12

## 2019-02-19 ENCOUNTER — APPOINTMENT (OUTPATIENT)
Dept: PULMONOLOGY | Facility: CLINIC | Age: 69
End: 2019-02-19
Payer: MEDICARE

## 2019-02-19 VITALS
SYSTOLIC BLOOD PRESSURE: 140 MMHG | HEIGHT: 66 IN | OXYGEN SATURATION: 95 % | BODY MASS INDEX: 28.93 KG/M2 | RESPIRATION RATE: 12 BRPM | HEART RATE: 86 BPM | WEIGHT: 180 LBS | TEMPERATURE: 98.5 F | DIASTOLIC BLOOD PRESSURE: 90 MMHG

## 2019-02-19 PROCEDURE — 94060 EVALUATION OF WHEEZING: CPT | Mod: 59

## 2019-02-19 PROCEDURE — 99204 OFFICE O/P NEW MOD 45 MIN: CPT | Mod: 25

## 2019-02-19 PROCEDURE — 94618 PULMONARY STRESS TESTING: CPT

## 2019-02-19 PROCEDURE — 36415 COLL VENOUS BLD VENIPUNCTURE: CPT

## 2019-02-19 PROCEDURE — 94729 DIFFUSING CAPACITY: CPT

## 2019-02-19 PROCEDURE — 94727 GAS DIL/WSHOT DETER LNG VOL: CPT

## 2019-02-20 NOTE — DISCUSSION/SUMMARY
[FreeTextEntry1] : ATTENDING SUMMARY\par 2-19-19\par -mildly deviated nasal septum with mild narrowing of nasal passages, no lesions, MP 4\par -no cervical adenopathy, no JVD at 45 degrees, no hepatojugular reflux \par -P2 not loud or split \par -no dullness to percussion, mildly diminished air entry, no wheezing or rhonchi \par -no rheumatological manifestations of CVD\par -no pedal edema \par \par Spirometry is within normal limits as well as lung volumes.   DLCO is normal. \par Patient walked 439 meters on 6 MWT, this represents a normal walk distance with no desaturation. \par

## 2019-02-20 NOTE — ASSESSMENT
[FreeTextEntry1] : 2-19-19 \par It was a pleasure to meet Rashmi in consult today.    Her respiratory issues are as follows:\par \par 1. Interstitial lung abnormality\par Rashmi had a CT chest as part of the lung cancer screening program on 1/25/19; it was her first dedicated chest CT.  It demonstrated peripheral reticular and groundglass opacities, particularly posteriorly. There were also some scattered TIB micronodules and a 9 mm air cyst in the lingula. While the posterior predilection of the groundglass opacities could point toward dependent atelectasis, I do not believe the interstitial lung abnormalities, including the reticular changes, are solely due to dependent changes. We will check screening CVD serologies (HECTOR, RF, CH50) today, as well as PFT and 6MWT.  We will follow-up with a CT chest as part of the LCS program in one year (02/2020); at that time, we will get imaging in both the prone and supine positions. We have also stressed how critical it is that Rashmi stop smoking immediately.\par \par 2. Active smoking\par While Rashmi has significantly cut back how much she is smoking in the last two weeks, she is still actively smoking about 1/2 pack per week.  Her last cigarette was earlier today.  I have stressed to her and her sister the importance of smoking cessation and that it is imperative that she not smoke again. She is having trouble affording the co-pay for her Chantix prescribed by Dr. Jacob, so we will ask Dr. Lala Epperson to look into patient assistance programs that may be able to help.\par \par 3. At risk for sleep apnea\par She has a Mallampati score of 4 and a history of snoring. We will order an attended sleep study.\par \par 3. Health maintenance\par Rashmi is up-to-date with her influenza and pneumococcal vaccinations.\par We have recommended regular aerobic exercise, eventually ramping up to 50-60 minutes per day at least 5 days per week and weight loss. \par \par RTC: 3-4 months

## 2019-02-20 NOTE — PHYSICAL EXAM
[General Appearance - Well Developed] : well developed [General Appearance - Well Nourished] : well nourished [IV] : IV [Heart Sounds] : normal S1 and S2 [Murmurs] : no murmurs present [Abnormal Walk] : normal gait [] : no rash [No Focal Deficits] : no focal deficits [Oriented To Time, Place, And Person] : oriented to person, place, and time [Affect] : the affect was normal [Memory Recent] : recent memory was not impaired [FreeTextEntry1] : no rheumatological manifestations of CVD [FreeTextEntry2] : no pedal edema

## 2019-02-20 NOTE — HISTORY OF PRESENT ILLNESS
[FreeTextEntry1] : Pt is 67 yo F with PMH TIA, left vertebral dissection, HTN, stable pre-diabetes, left ICA stenosis, GERD, duodenal ulcer, arthritis.\par 37 pack year history of smoking, still currently smoking. \par \par Has tried patches and lozenges in the past.  Wants to start Chantix, was prescribed by Dr. Jacob last week but she states she couldn't afford the co-pay. \par Last cigarette was earlier today. Smoking roughly 10 cig/week. \par \par Has mild cough, cough is dry. \par Denies chest tightness, wheezing, recurrent respiratory tract infections. \par No ED visits or hospitalizations for breathing. \par No diagnosis of asthma or PNA in the past. No personal history of blood clots or cancers. \par Currently no pets. Had a parakeet for 8 years, 25 years ago. \par She has down throw pillows.  She has a 10-year old carpet in her bedroom.  \par No mold in the homes.\par She had a home in [place] [id number] where radon levels were borderline elevated.\par No other inhalational exposures she is aware of. \par She has a history of snoring.\par \par FHx: sister breast CA (diagnosed around age 65 yo)\par PCN: penicillin (angioedema)\par

## 2019-02-20 NOTE — PROCEDURE
[FreeTextEntry1] : PFT, 6MWT 19:\par FVC: 2.72 L (92%)\par FEV1: 2.38 L (101%)\par FEV1/FVC: 87%\par DCE31-15%: 3.20 L/s (133%)\par T.23 L (84%)\par RV/T%\par DLCO: 15.0 (69%)\par 6MWT: 439 meters, SpO2 start: 97% --> SpO2 end: 95%\par Normal spirometry. Lung volumes normal. Mildly reduced diffusion capacity. Normal walk distance with no desaturation. \par \par EXAM: CT LDCT LUNG CA SCRN BASELINE \par *** ADDENDUM 2019 *** \par Addendum: \par Abbreviated differential for these peripheral groundglass opacities in the setting of smoking must extend to include DIP. Bronchoscopic correlation and additional HRCT imaging to include end inspiration/end expiration is suggested. \par *** END OF ADDENDUM 2019 *** \par \par PROCEDURE DATE: 2019 \par INTERPRETATION: CT scan of the chest without intravenous contrast, using low-dose lung cancer screening protocol \par History: 65-year-old female nonsmoker with a 37.5 pack year history of smoking \par Comparison: None. \par Findings: \par Lungs and airways: Image numbers for description of nodule location refer to thin section axial series number 4. Scattered calcified granulomata. 4 mm nodule within the right upper lobe (image 166). Is a mild peripheral reticular and groundglass opacities which demonstrates temporal homogeneity with a suggestion of some subpleural sparing involving the upper and lower lung zones slightly more pronounced inferiorly. There are some scattered tree-in-bud centrilobular micronodules. No significant air-trapping is noted. 9 mm air cyst is noted within the lingula (image 167) \par Trachea and central bronchi patent. \par Pleura: The pleural spaces are clear. \par Base of neck, mediastinum and heart: The thyroid gland demonstrates coarse parenchymal calcifications. No mediastinal, hilar or axillary lymphadenopathy is seen. The heart and pericardium are within normal limits. \par Mild calcified atheromatous plaque formation involving the thoracic aorta most pronounced in the arch and proximal abdominal regions \par Coronary artery calcification: None \par Soft tissues: Normal. \par Abdomen: This study was performed without contrast and with lower than standard dose. These factors reduce sensitivity for detection of small lesions in the upper abdomen. Given these technical limitations, no focal lesion is seen within the visualized organs. \par Bones: Mild age-appropriate degenerative change. Small sclerotic focus involving the T5 vertebral body compatible with a probable bone island. \par Impression: \par 1. Scattered tree-in-bud centrilobular micronodules with mild small airway inflammation. No significant air-trapping is identified. \par 2. Mild peripheral reticular and groundglass opacities involving all 5 pulmonary lobes with a slightly lower lung zone predilection. There is relative temporal homogeneity with possibly some subpleural sparing. No \par pulmonary fibrosis. Abbreviated differential includes an NSIP pattern. \par Respiratory bronchiolitis although centrilobular groundglass nodules are not well-visualized, and LIP cannot be excluded. Further imaging to include HRCT to be performed at end inspiration/end expiration is suggested for more complete evaluation \par 3. 4 mm nodule within the right upper lobe. Scattered calcified granulomata. \par Lung-RADS category: 2S - Benign appearance or behavior. Nodules with very low likelihood of becoming a clinically active cancer due to size or lack of growth. Probability of malignancy < 1%. \par Recommendation: \par 2S - Continue annual screening with LDCT in 12 months.

## 2019-02-26 ENCOUNTER — RESULT REVIEW (OUTPATIENT)
Age: 69
End: 2019-02-26

## 2019-03-02 LAB
ANA SER IF-ACNC: NEGATIVE
CH50 SERPL-MCNC: 72 U/ML
RHEUMATOID FACT SER QL: 18 IU/ML

## 2019-03-13 ENCOUNTER — APPOINTMENT (OUTPATIENT)
Dept: SLEEP CENTER | Facility: HOSPITAL | Age: 69
End: 2019-03-13

## 2019-03-13 ENCOUNTER — OUTPATIENT (OUTPATIENT)
Dept: OUTPATIENT SERVICES | Facility: HOSPITAL | Age: 69
LOS: 1 days | End: 2019-03-13
Payer: MEDICARE

## 2019-03-13 DIAGNOSIS — G47.33 OBSTRUCTIVE SLEEP APNEA (ADULT) (PEDIATRIC): ICD-10-CM

## 2019-03-13 PROCEDURE — 95810 POLYSOM 6/> YRS 4/> PARAM: CPT

## 2019-03-13 PROCEDURE — 95810 POLYSOM 6/> YRS 4/> PARAM: CPT | Mod: 26

## 2019-03-15 ENCOUNTER — OTHER (OUTPATIENT)
Age: 69
End: 2019-03-15

## 2019-03-18 ENCOUNTER — TRANSCRIPTION ENCOUNTER (OUTPATIENT)
Age: 69
End: 2019-03-18

## 2019-03-25 ENCOUNTER — APPOINTMENT (OUTPATIENT)
Dept: DERMATOLOGY | Facility: CLINIC | Age: 69
End: 2019-03-25

## 2019-03-25 ENCOUNTER — RESULT REVIEW (OUTPATIENT)
Age: 69
End: 2019-03-25

## 2019-03-28 ENCOUNTER — OTHER (OUTPATIENT)
Age: 69
End: 2019-03-28

## 2019-03-28 ENCOUNTER — APPOINTMENT (OUTPATIENT)
Dept: NEUROLOGY | Facility: CLINIC | Age: 69
End: 2019-03-28
Payer: MEDICARE

## 2019-03-28 VITALS
OXYGEN SATURATION: 97 % | SYSTOLIC BLOOD PRESSURE: 156 MMHG | HEART RATE: 74 BPM | BODY MASS INDEX: 28.93 KG/M2 | TEMPERATURE: 97.8 F | DIASTOLIC BLOOD PRESSURE: 72 MMHG | WEIGHT: 180 LBS | HEIGHT: 66 IN

## 2019-03-28 PROCEDURE — 99214 OFFICE O/P EST MOD 30 MIN: CPT

## 2019-03-28 RX ORDER — TIZANIDINE HYDROCHLORIDE 2 MG/1
2 CAPSULE ORAL
Qty: 30 | Refills: 1 | Status: DISCONTINUED | COMMUNITY
Start: 2019-01-15 | End: 2019-03-28

## 2019-03-28 NOTE — DATA REVIEWED
[de-identified] : Sleep study (3/19/2019): Severe sleep disordered breathing consisting mostly of hypopneas and obstructive apneas.\par

## 2019-03-28 NOTE — ASSESSMENT
[FreeTextEntry1] : 68 year-old female with PMH HTN, prediabetes presents for follow up for gait instability secondary to cerebellar infarct secondary to left vertebral dissection.  Patient is neurologically stable.   \par \par Plan for left cerebellar stroke secondary to left vertebral dissection:\par 1) Secondary stroke prevention \par a) Continue Plavix 75mg for antiplatelet.  Aspirin depending on cardiac needs.  Reevaluate need for dual antiplatelet post repeat carotid dopplers.   \par b) Continue Atorvastatin 80mg, increased dose recommended by her cardiologists for carotid stenosis\par \par 2) Risk factor management - \par a) HTN - slightly elevated in clinic today; patient switched from Losartan to Irbesartan by Dr. Reyes, her Cardiologist.  Recommended that she contact the Cardiologist to get clarification regarding appropriate blood pressure medication.  \par b) Hyperlipidemia - see above\par c) Prediabetes - diet control\par d) Carotid stenosis - continue with maximal medical therapy - Aspirin, Plavix and Atorvastatin.  Carotid doppler u/s scheduled for Nov 2019.\par e) Obstructive sleep apnea- severe obstructive sleep apnea diagnosed 3/13/19- Discussed benefits of CPAP machine.\par f) Smoking Encouraged her to work on smoking cessation- Agree with Chantix.

## 2019-03-28 NOTE — REVIEW OF SYSTEMS
[Anxiety] : anxiety [Negative] : Heme/Lymph [As Noted in HPI] : as noted in HPI [de-identified] : Restless legs at night

## 2019-03-28 NOTE — PHYSICAL EXAM
[FreeTextEntry1] : General: sitting on exam table comfortably, NAD\par Eyes: anicteric sclera\par CV: RRR\par Extremities: FROMx4, 2+ radial pulses bilaterally\par 	\par Neurological exam:\par Mental status: AA&O x 3, fluent, no dysarthria, follows all commands, able give full history of present and past events, normal attention span\par Cranial nerves: EOMI, VFF, no nystagmus, facial sensation intact, no facial droop, shoulder shrug intact bilaterally, tongue midline\par Motor: No pronator drift, 5/5 on both UE and LE, normal bulk and tone\par Sensory: Intact light touch bilaterally \par Reflexes: 2+ b/l UE and LE\par Cerebellar: FNF intact bilaterally\par Gait: steady

## 2019-03-28 NOTE — HISTORY OF PRESENT ILLNESS
[FreeTextEntry1] : 68 year-old female presents for follow up for cerebellar infarct secondary to left vertebral dissection. No new weakness, numbness, speech or visual deficits.  Patient states she has right side macular degeneration of the eye but still has vision. Overall, she reports feeling well. \par \par Plavix - No bleeding in urine or stool.\par Atorvastatin - No muscle cramps or pains.  Patient is now on 80mg of atorvastatin due to cardiologist recommendation. \par \par Stroke risk factors - \par 1) HTN - Blood pressure elevated today.  She follows with Dr. Reyes, Cardiology.  Patient continued to be on Losartan 100mg even though she was switched to irbesartan or Olmesartan. Her pharmacy reportedly had shortage of these medications. Does not measure BP at home due to machine inaccuracy.\par 2) Hyperlipidemia - see above\par 3) Prediabetes - Continues to go to the gym for exercise.  No weight loss. \par 4) Carotid stenosis - repeat carotid dopplers in November 2019\par 5) Smoking -patient started taking Chantix this past [place] 3/24- smokes 8 cigarettes. Patient states she is really working on quitting smoking. \par 6) Obstructive sleep apnea - Patient also states she has recently been diagnosed with sleep apnea after sleep studies done 3/13/2019.  Recommendation is treatment for severe obstructive sleep apnea. She was contacted by the sleep studies clinic to test her oxygenation and possibly get a CPAP machine.

## 2019-04-02 ENCOUNTER — FORM ENCOUNTER (OUTPATIENT)
Age: 69
End: 2019-04-02

## 2019-04-03 ENCOUNTER — RX RENEWAL (OUTPATIENT)
Age: 69
End: 2019-04-03

## 2019-04-03 ENCOUNTER — OUTPATIENT (OUTPATIENT)
Dept: OUTPATIENT SERVICES | Facility: HOSPITAL | Age: 69
LOS: 1 days | End: 2019-04-03
Payer: MEDICARE

## 2019-04-03 ENCOUNTER — APPOINTMENT (OUTPATIENT)
Dept: ULTRASOUND IMAGING | Facility: HOSPITAL | Age: 69
End: 2019-04-03
Payer: MEDICARE

## 2019-04-03 PROCEDURE — 76536 US EXAM OF HEAD AND NECK: CPT

## 2019-04-03 PROCEDURE — 76536 US EXAM OF HEAD AND NECK: CPT | Mod: 26

## 2019-04-22 ENCOUNTER — APPOINTMENT (OUTPATIENT)
Dept: INTERNAL MEDICINE | Facility: CLINIC | Age: 69
End: 2019-04-22
Payer: MEDICARE

## 2019-04-22 VITALS
DIASTOLIC BLOOD PRESSURE: 84 MMHG | TEMPERATURE: 98 F | SYSTOLIC BLOOD PRESSURE: 147 MMHG | HEART RATE: 88 BPM | OXYGEN SATURATION: 98 % | HEIGHT: 66 IN | BODY MASS INDEX: 28.61 KG/M2 | WEIGHT: 178 LBS

## 2019-04-22 DIAGNOSIS — F17.200 NICOTINE DEPENDENCE, UNSPECIFIED, UNCOMPLICATED: ICD-10-CM

## 2019-04-22 PROCEDURE — G0439: CPT | Mod: 25

## 2019-04-22 PROCEDURE — 99406 BEHAV CHNG SMOKING 3-10 MIN: CPT

## 2019-04-24 ENCOUNTER — APPOINTMENT (OUTPATIENT)
Dept: SLEEP CENTER | Facility: HOSPITAL | Age: 69
End: 2019-04-24

## 2019-04-24 ENCOUNTER — OUTPATIENT (OUTPATIENT)
Dept: OUTPATIENT SERVICES | Facility: HOSPITAL | Age: 69
LOS: 1 days | End: 2019-04-24
Payer: MEDICARE

## 2019-04-24 DIAGNOSIS — G47.33 OBSTRUCTIVE SLEEP APNEA (ADULT) (PEDIATRIC): ICD-10-CM

## 2019-04-24 PROCEDURE — 95811 POLYSOM 6/>YRS CPAP 4/> PARM: CPT | Mod: 26

## 2019-04-24 PROCEDURE — 95811 POLYSOM 6/>YRS CPAP 4/> PARM: CPT

## 2019-04-25 ENCOUNTER — RX RENEWAL (OUTPATIENT)
Age: 69
End: 2019-04-25

## 2019-04-30 ENCOUNTER — RX RENEWAL (OUTPATIENT)
Age: 69
End: 2019-04-30

## 2019-04-30 RX ORDER — OLMESARTAN MEDOXOMIL 40 MG/1
40 TABLET, FILM COATED ORAL
Qty: 90 | Refills: 0 | Status: DISCONTINUED | COMMUNITY
Start: 2019-02-08 | End: 2019-04-30

## 2019-05-16 ENCOUNTER — MEDICATION RENEWAL (OUTPATIENT)
Age: 69
End: 2019-05-16

## 2019-05-21 ENCOUNTER — APPOINTMENT (OUTPATIENT)
Dept: PULMONOLOGY | Facility: CLINIC | Age: 69
End: 2019-05-21

## 2019-06-10 ENCOUNTER — RX RENEWAL (OUTPATIENT)
Age: 69
End: 2019-06-10

## 2019-06-13 ENCOUNTER — APPOINTMENT (OUTPATIENT)
Dept: HEART AND VASCULAR | Facility: CLINIC | Age: 69
End: 2019-06-13
Payer: MEDICARE

## 2019-06-13 VITALS
SYSTOLIC BLOOD PRESSURE: 154 MMHG | OXYGEN SATURATION: 96 % | WEIGHT: 178 LBS | HEART RATE: 77 BPM | DIASTOLIC BLOOD PRESSURE: 80 MMHG | TEMPERATURE: 98 F | HEIGHT: 66 IN | BODY MASS INDEX: 28.61 KG/M2

## 2019-06-13 PROCEDURE — 99214 OFFICE O/P EST MOD 30 MIN: CPT

## 2019-06-13 PROCEDURE — 93000 ELECTROCARDIOGRAM COMPLETE: CPT

## 2019-06-13 NOTE — HISTORY OF PRESENT ILLNESS
[FreeTextEntry1] : 68 f with tia htn carotid stenosis ccta 2016 normal coronaries echo asymmetric septal hypertrophy with dynamic outflow tract obstruction peak gradient 47 resting 14 mmHg. negative genetic testing negative CMRI ILD ARNOL has no shortness of breath still with chest pain only at rest never on exertion walks a mild a day \par \par ecg nsr lvh nsst changes

## 2019-06-13 NOTE — PHYSICAL EXAM
[General Appearance - Well Developed] : well developed [Well Groomed] : well groomed [Normal Appearance] : normal appearance [No Deformities] : no deformities [General Appearance - Well Nourished] : well nourished [General Appearance - In No Acute Distress] : no acute distress [Normal Oral Mucosa] : normal oral mucosa [Eyelids - No Xanthelasma] : the eyelids demonstrated no xanthelasmas [Normal Conjunctiva] : the conjunctiva exhibited no abnormalities [No Oral Pallor] : no oral pallor [Normal Jugular Venous A Waves Present] : normal jugular venous A waves present [No Oral Cyanosis] : no oral cyanosis [No Jugular Venous Burks A Waves] : no jugular venous burks A waves [Normal Jugular Venous V Waves Present] : normal jugular venous V waves present [Auscultation Breath Sounds / Voice Sounds] : lungs were clear to auscultation bilaterally [Respiration, Rhythm And Depth] : normal respiratory rhythm and effort [Exaggerated Use Of Accessory Muscles For Inspiration] : no accessory muscle use [Heart Rate And Rhythm] : heart rate and rhythm were normal [Murmurs] : no murmurs present [Heart Sounds] : normal S1 and S2 [Abdomen Tenderness] : non-tender [Abdomen Soft] : soft [Abdomen Mass (___ Cm)] : no abdominal mass palpated [Abnormal Walk] : normal gait [Nail Clubbing] : no clubbing of the fingernails [Gait - Sufficient For Exercise Testing] : the gait was sufficient for exercise testing [Cyanosis, Localized] : no localized cyanosis [Petechial Hemorrhages (___cm)] : no petechial hemorrhages [Skin Color & Pigmentation] : normal skin color and pigmentation [No Venous Stasis] : no venous stasis [] : no rash [Skin Lesions] : no skin lesions [No Xanthoma] : no  xanthoma was observed [No Skin Ulcers] : no skin ulcer [Affect] : the affect was normal [Oriented To Time, Place, And Person] : oriented to person, place, and time [Mood] : the mood was normal [No Anxiety] : not feeling anxious [FreeTextEntry1] : systolic murmur

## 2019-06-13 NOTE — ASSESSMENT
[FreeTextEntry1] : htn bp elevated add coreg 6.25 bid\par carotid stenosis on atorvastatin await neuro response need for plavix and asa \par smoking cessation stopped smoking may have ILD\par asymetric septal hypertrophy no outflow gradient continue current meds hydration no further work up\par fu in one month

## 2019-06-15 ENCOUNTER — MOBILE ON CALL (OUTPATIENT)
Age: 69
End: 2019-06-15

## 2019-08-01 ENCOUNTER — RX RENEWAL (OUTPATIENT)
Age: 69
End: 2019-08-01

## 2019-08-12 ENCOUNTER — MEDICATION RENEWAL (OUTPATIENT)
Age: 69
End: 2019-08-12

## 2019-08-19 ENCOUNTER — TRANSCRIPTION ENCOUNTER (OUTPATIENT)
Age: 69
End: 2019-08-19

## 2019-08-20 ENCOUNTER — TRANSCRIPTION ENCOUNTER (OUTPATIENT)
Age: 69
End: 2019-08-20

## 2019-08-21 ENCOUNTER — TRANSCRIPTION ENCOUNTER (OUTPATIENT)
Age: 69
End: 2019-08-21

## 2019-08-23 ENCOUNTER — APPOINTMENT (OUTPATIENT)
Dept: INTERNAL MEDICINE | Facility: CLINIC | Age: 69
End: 2019-08-23
Payer: MEDICARE

## 2019-08-23 VITALS
DIASTOLIC BLOOD PRESSURE: 78 MMHG | HEART RATE: 66 BPM | SYSTOLIC BLOOD PRESSURE: 135 MMHG | HEIGHT: 66 IN | BODY MASS INDEX: 29.41 KG/M2 | WEIGHT: 183 LBS | TEMPERATURE: 97.3 F | OXYGEN SATURATION: 98 %

## 2019-08-23 PROCEDURE — 99214 OFFICE O/P EST MOD 30 MIN: CPT | Mod: 25

## 2019-08-23 PROCEDURE — 36415 COLL VENOUS BLD VENIPUNCTURE: CPT

## 2019-08-25 ENCOUNTER — TRANSCRIPTION ENCOUNTER (OUTPATIENT)
Age: 69
End: 2019-08-25

## 2019-08-25 LAB
ALBUMIN SERPL ELPH-MCNC: 4.7 G/DL
ALP BLD-CCNC: 81 U/L
ALT SERPL-CCNC: 23 U/L
ANION GAP SERPL CALC-SCNC: 19 MMOL/L
AST SERPL-CCNC: 23 U/L
BASOPHILS # BLD AUTO: 0.04 K/UL
BASOPHILS NFR BLD AUTO: 0.5 %
BILIRUB SERPL-MCNC: 0.9 MG/DL
BUN SERPL-MCNC: 16 MG/DL
CALCIUM SERPL-MCNC: 10 MG/DL
CHLORIDE SERPL-SCNC: 101 MMOL/L
CO2 SERPL-SCNC: 26 MMOL/L
CREAT SERPL-MCNC: 0.68 MG/DL
EOSINOPHIL # BLD AUTO: 0.18 K/UL
EOSINOPHIL NFR BLD AUTO: 2.4 %
GLUCOSE SERPL-MCNC: 146 MG/DL
HCT VFR BLD CALC: 47.5 %
HGB BLD-MCNC: 14.3 G/DL
IMM GRANULOCYTES NFR BLD AUTO: 0.4 %
LYMPHOCYTES # BLD AUTO: 1.93 K/UL
LYMPHOCYTES NFR BLD AUTO: 25.7 %
MAN DIFF?: NORMAL
MCHC RBC-ENTMCNC: 28.8 PG
MCHC RBC-ENTMCNC: 30.1 GM/DL
MCV RBC AUTO: 95.8 FL
MONOCYTES # BLD AUTO: 0.39 K/UL
MONOCYTES NFR BLD AUTO: 5.2 %
NEUTROPHILS # BLD AUTO: 4.93 K/UL
NEUTROPHILS NFR BLD AUTO: 65.8 %
PLATELET # BLD AUTO: 260 K/UL
POTASSIUM SERPL-SCNC: 4.4 MMOL/L
PROT SERPL-MCNC: 7.1 G/DL
RBC # BLD: 4.96 M/UL
RBC # FLD: 14.9 %
SODIUM SERPL-SCNC: 146 MMOL/L
WBC # FLD AUTO: 7.5 K/UL

## 2019-09-19 ENCOUNTER — TRANSCRIPTION ENCOUNTER (OUTPATIENT)
Age: 69
End: 2019-09-19

## 2019-09-19 ENCOUNTER — MEDICATION RENEWAL (OUTPATIENT)
Age: 69
End: 2019-09-19

## 2019-09-30 PROBLEM — Z91.89 AT RISK FOR OBSTRUCTIVE SLEEP APNEA: Status: RESOLVED | Noted: 2019-02-20 | Resolved: 2019-09-30

## 2019-10-03 ENCOUNTER — APPOINTMENT (OUTPATIENT)
Dept: PULMONOLOGY | Facility: CLINIC | Age: 69
End: 2019-10-03
Payer: MEDICARE

## 2019-10-03 VITALS
RESPIRATION RATE: 12 BRPM | TEMPERATURE: 98.6 F | WEIGHT: 177 LBS | HEART RATE: 64 BPM | SYSTOLIC BLOOD PRESSURE: 130 MMHG | BODY MASS INDEX: 28.45 KG/M2 | OXYGEN SATURATION: 97 % | DIASTOLIC BLOOD PRESSURE: 90 MMHG | HEIGHT: 66 IN

## 2019-10-03 DIAGNOSIS — Z91.89 OTHER SPECIFIED PERSONAL RISK FACTORS, NOT ELSEWHERE CLASSIFIED: ICD-10-CM

## 2019-10-03 PROCEDURE — 94010 BREATHING CAPACITY TEST: CPT

## 2019-10-03 PROCEDURE — 94729 DIFFUSING CAPACITY: CPT

## 2019-10-07 ENCOUNTER — APPOINTMENT (OUTPATIENT)
Dept: INTERNAL MEDICINE | Facility: CLINIC | Age: 69
End: 2019-10-07

## 2019-10-11 ENCOUNTER — RX RENEWAL (OUTPATIENT)
Age: 69
End: 2019-10-11

## 2019-10-16 ENCOUNTER — RESULT REVIEW (OUTPATIENT)
Age: 69
End: 2019-10-16

## 2019-10-22 ENCOUNTER — RX RENEWAL (OUTPATIENT)
Age: 69
End: 2019-10-22

## 2019-11-08 ENCOUNTER — APPOINTMENT (OUTPATIENT)
Dept: NEUROLOGY | Facility: CLINIC | Age: 69
End: 2019-11-08

## 2019-11-11 ENCOUNTER — OTHER (OUTPATIENT)
Age: 69
End: 2019-11-11

## 2019-11-14 ENCOUNTER — APPOINTMENT (OUTPATIENT)
Dept: NEUROLOGY | Facility: CLINIC | Age: 69
End: 2019-11-14
Payer: MEDICARE

## 2019-11-14 PROCEDURE — 93880 EXTRACRANIAL BILAT STUDY: CPT

## 2019-11-19 ENCOUNTER — APPOINTMENT (OUTPATIENT)
Dept: NEUROLOGY | Facility: CLINIC | Age: 69
End: 2019-11-19
Payer: MEDICARE

## 2019-11-19 VITALS
DIASTOLIC BLOOD PRESSURE: 72 MMHG | HEART RATE: 64 BPM | OXYGEN SATURATION: 96 % | WEIGHT: 177 LBS | TEMPERATURE: 98.6 F | SYSTOLIC BLOOD PRESSURE: 130 MMHG | BODY MASS INDEX: 28.45 KG/M2 | HEIGHT: 66 IN

## 2019-11-19 PROCEDURE — 99214 OFFICE O/P EST MOD 30 MIN: CPT

## 2019-11-19 NOTE — REVIEW OF SYSTEMS
[Anxiety] : anxiety [As Noted in HPI] : as noted in HPI [Negative] : Heme/Lymph [Shortness Of Breath] : no shortness of breath [de-identified] : Restless legs at night [FreeTextEntry6] : Supposedly good report recently

## 2019-11-19 NOTE — CONSULT LETTER
[Courtesy Letter:] : I had the pleasure of seeing your patient, [unfilled], in my office today. [Dear  ___] : Dear  [unfilled], [FreeTextEntry2] : Oral Jacob MD\par [address] [place] [address]\par [place], [place]

## 2019-11-19 NOTE — HISTORY OF PRESENT ILLNESS
[FreeTextEntry1] : 68 year-old female presents for follow up for cerebellar infarct secondary to left vertebral dissection. No new weakness, numbness, speech or visual deficits.  Overall, she reports feeling well.   Increased stress at home since her son and grandson are staying with her.  [facility].\par \par Plavix - No bleeding in urine or stool.\par Atorvastatin - No muscle cramps or pains.\par \par Stroke risk factors - \par 1) HTN - Blood pressure controlled today.  She's on same regimen except that she forgets her Carvedilol at night.  Dr. Reyes, Cardiology, in charge of blood pressure meds.\par 2) Hyperlipidemia - see above\par 3) Prediabetes - Continues to go to the gym for exercise.  Doesn't eat much snack anymore. \par 4) Carotid stenosis - repeat carotid dopplers done this past Thursday.\par 5) Smoking - She's still smoking since has a lot of high stress.  Failed Chantix. She's aware of the issues with smoking.\par 6) Obstructive sleep apnea - She doesn't use the CPAP machine due to nose pain.  She didn't feel any different when she used the machine.  She has follow up patient with sleep doctor on December 4th.

## 2019-11-19 NOTE — DATA REVIEWED
[de-identified] : \par Carotid dopplers (Performed 11/14/2019): Mild calcified homogenous plaques at the bilateral bulb and proximal bilateral ICA with less than 50% stenosis by diameter reduction without hemodynamically significant stenosis.

## 2019-11-19 NOTE — ASSESSMENT
[FreeTextEntry1] : 68 year-old female with PMH HTN, prediabetes presents for follow up for gait instability secondary to cerebellar infarct secondary to left vertebral dissection.  Patient is neurologically stable.   \par \par Plan for left cerebellar stroke secondary to left vertebral dissection:\par 1) Secondary stroke prevention \par a) Continue Plavix 75mg for antiplatelet.  Aspirin was for cardiac reasons.  Not necessary from stroke standpoint.\par b) Continue Atorvastatin 80mg.  LHigher dose based on cardiac recommendations.DL controlled based on labs from January. Would appreciate more recent lipid profile.    \par \par 2) Risk factor management - \par a) HTN - controlled today.  Discussed importance of compliance with her medications as prescribed.  She will try to take Coreg twice a day.    \par b) Hyperlipidemia - see above\par c) Prediabetes - diet control since hemoglobin A1C in January was normal range.  Would appreciate more recent hemoglobin A1C.\par d) Carotid stenosis - reasonable based on carotid dopplers performed this past week.  Continue with medical therapy - Plavix and Atorvastatin.  She can discuss need for Aspirin with Cardiologist.\par e) Obstructive sleep apnea- severe obstructive sleep apnea diagnosed 3/13/19- Discussed importance of using her CPAP machine.  She expressed understanding but has reservations about using the machine.  Deferred to upcoming appointment with sleep doctor.  \par f) Smoking - Encouraged her to work on smoking cessation but acknowledged her increased stress.\par \par 3) Referred her to Stroke Support Group.

## 2019-12-04 ENCOUNTER — APPOINTMENT (OUTPATIENT)
Dept: PULMONOLOGY | Facility: CLINIC | Age: 69
End: 2019-12-04

## 2019-12-23 ENCOUNTER — MEDICATION RENEWAL (OUTPATIENT)
Age: 69
End: 2019-12-23

## 2020-01-21 ENCOUNTER — APPOINTMENT (OUTPATIENT)
Dept: HEART AND VASCULAR | Facility: CLINIC | Age: 70
End: 2020-01-21
Payer: MEDICARE

## 2020-01-21 ENCOUNTER — NON-APPOINTMENT (OUTPATIENT)
Age: 70
End: 2020-01-21

## 2020-01-21 VITALS
OXYGEN SATURATION: 97 % | HEART RATE: 76 BPM | HEIGHT: 66 IN | BODY MASS INDEX: 28.45 KG/M2 | SYSTOLIC BLOOD PRESSURE: 168 MMHG | WEIGHT: 177 LBS | DIASTOLIC BLOOD PRESSURE: 90 MMHG

## 2020-01-21 PROCEDURE — 93000 ELECTROCARDIOGRAM COMPLETE: CPT

## 2020-01-21 PROCEDURE — 99214 OFFICE O/P EST MOD 30 MIN: CPT

## 2020-01-21 RX ORDER — VARENICLINE TARTRATE 0.5 (11)-1
0.5 MG X 11 & KIT ORAL
Qty: 1 | Refills: 2 | Status: DISCONTINUED | COMMUNITY
Start: 2019-02-14 | End: 2020-01-21

## 2020-01-21 RX ORDER — CARVEDILOL 6.25 MG/1
6.25 TABLET, FILM COATED ORAL
Qty: 180 | Refills: 3 | Status: DISCONTINUED | COMMUNITY
Start: 2019-06-13 | End: 2020-01-21

## 2020-01-21 RX ORDER — IRBESARTAN 300 MG/1
300 TABLET ORAL DAILY
Qty: 90 | Refills: 3 | Status: DISCONTINUED | COMMUNITY
Start: 2019-02-05 | End: 2020-01-21

## 2020-01-22 NOTE — PHYSICAL EXAM
[General Appearance - Well Developed] : well developed [Normal Appearance] : normal appearance [Well Groomed] : well groomed [General Appearance - Well Nourished] : well nourished [No Deformities] : no deformities [Normal Conjunctiva] : the conjunctiva exhibited no abnormalities [General Appearance - In No Acute Distress] : no acute distress [Normal Oral Mucosa] : normal oral mucosa [Eyelids - No Xanthelasma] : the eyelids demonstrated no xanthelasmas [No Oral Pallor] : no oral pallor [No Oral Cyanosis] : no oral cyanosis [Normal Jugular Venous A Waves Present] : normal jugular venous A waves present [No Jugular Venous Burks A Waves] : no jugular venous burks A waves [Normal Jugular Venous V Waves Present] : normal jugular venous V waves present [Respiration, Rhythm And Depth] : normal respiratory rhythm and effort [Heart Rate And Rhythm] : heart rate and rhythm were normal [Exaggerated Use Of Accessory Muscles For Inspiration] : no accessory muscle use [Auscultation Breath Sounds / Voice Sounds] : lungs were clear to auscultation bilaterally [Heart Sounds] : normal S1 and S2 [Murmurs] : no murmurs present [Abdomen Soft] : soft [Abdomen Tenderness] : non-tender [Abdomen Mass (___ Cm)] : no abdominal mass palpated [Abnormal Walk] : normal gait [Gait - Sufficient For Exercise Testing] : the gait was sufficient for exercise testing [Petechial Hemorrhages (___cm)] : no petechial hemorrhages [Nail Clubbing] : no clubbing of the fingernails [Cyanosis, Localized] : no localized cyanosis [Skin Color & Pigmentation] : normal skin color and pigmentation [] : no rash [No Venous Stasis] : no venous stasis [Skin Lesions] : no skin lesions [No Skin Ulcers] : no skin ulcer [No Xanthoma] : no  xanthoma was observed [Oriented To Time, Place, And Person] : oriented to person, place, and time [Mood] : the mood was normal [Affect] : the affect was normal [No Anxiety] : not feeling anxious [FreeTextEntry1] : systolic murmur

## 2020-01-22 NOTE — ASSESSMENT
[FreeTextEntry1] : htn bp elevated ran out of meds reassess \par carotid stenosis on atorvastatin per neuro needs to be on lifelong plavix \par smoking cessation started smoking may have ILD \par asymetric septal hypertrophy no outflow gradient continue current meds hydration no further work up\par fu in one month

## 2020-01-22 NOTE — HISTORY OF PRESENT ILLNESS
[FreeTextEntry1] : 69 F with tia htn carotid stenosis ccta 2016 normal coronaries echo asymmetric septal hypertrophy with dynamic outflow tract obstruction peak gradient 47 resting 14 mmHg. negative genetic testing negative CMRI ILD ARNOL has no shortness of breath still with chest pain only at rest never on exertion walks a mile a day \par \par ecg nsr lvh nsst changes

## 2020-02-03 ENCOUNTER — FORM ENCOUNTER (OUTPATIENT)
Age: 70
End: 2020-02-03

## 2020-02-04 ENCOUNTER — OUTPATIENT (OUTPATIENT)
Dept: OUTPATIENT SERVICES | Facility: HOSPITAL | Age: 70
LOS: 1 days | End: 2020-02-04
Payer: MEDICARE

## 2020-02-04 ENCOUNTER — APPOINTMENT (OUTPATIENT)
Dept: CT IMAGING | Facility: HOSPITAL | Age: 70
End: 2020-02-04
Payer: MEDICARE

## 2020-02-04 ENCOUNTER — APPOINTMENT (OUTPATIENT)
Dept: PULMONOLOGY | Facility: CLINIC | Age: 70
End: 2020-02-04
Payer: MEDICARE

## 2020-02-04 VITALS
TEMPERATURE: 98.1 F | OXYGEN SATURATION: 97 % | BODY MASS INDEX: 28.45 KG/M2 | HEIGHT: 66 IN | HEART RATE: 67 BPM | WEIGHT: 177 LBS | SYSTOLIC BLOOD PRESSURE: 126 MMHG | DIASTOLIC BLOOD PRESSURE: 64 MMHG

## 2020-02-04 PROCEDURE — G0297: CPT

## 2020-02-04 PROCEDURE — G0296 VISIT TO DETERM LDCT ELIG: CPT

## 2020-02-04 PROCEDURE — 99406 BEHAV CHNG SMOKING 3-10 MIN: CPT | Mod: 25

## 2020-02-04 PROCEDURE — G0297: CPT | Mod: 26

## 2020-02-04 NOTE — HISTORY OF PRESENT ILLNESS
[_____ pack-years] : [unfilled] pack-years [Current] : current smoker [TextBox_13] : Referred by Dr. Oral Jacob and Dr. Griffin\par \par Ms. CHERELLE MARTINEZ is a 69 year old woman with a history of HTN and TIA and CAD\par \par She was seen in the office today for review of eligibility for, as well as, discussion of Low-Dose CT lung cancer screening program. Reviewed and confirmed that the patient meets screening eligibility criteria:\par -Age: 69 year \par Smoking status:\par -Current smoker of about 1 PPD\par -Number of pack(s) per day: average .75\par -Number of year smoked: 51\par -Number of pack years smokin\par \par Has been trying to quit on and off for years. \par Tried 21 mg patch and gum this year "But I look for any excuse to light up a cigarette and take off the patch" Afraid to smoke with the patch on \par patch gives her local inflammation/discomfort (suggested changing brand of patch and using hydrocortisone cream or ice)\par Suggested using the gum every 1-2 hours standing initially do she does not have breakthrough cravings\par Tried Nicotine Anonymous in the past\par Chantix was helpful in the past but when it was prescribed it was too expensive\par Ms. MARTINEZ denies any signs or symptoms of lung cancer including new cough, change in cough, hemoptysis and unintentional weight loss. Able to walk on level ground w/o a problem. Goes to the gym and walks briskly\par \par Ms. MARTINEZ denies any personal history of lung cancer. No lung cancer in a 1st degree relative. Denies any history of lung disease. Denies any history of occupational exposures. \par  [TextBox_6] : .75 [TextBox_8] : 51

## 2020-02-04 NOTE — PLAN
[Smoking Cessation Pamphlet Given] : smoking cessation pamphlet given to patient  [Smoking Cessation Guidance Provided] : Smoking cessation guidance was provided to patient [[facility]] : referred to [facility] [phone] [FreeTextEntry1] : Plan:\par -Low Dose CT chest for lung cancer screening\par -Follow up with patient and her referring provider after her LDCT results have been reviewed by the multi-disciplinary clinical team\par -Encouraged smoking cessation\par -[facility] Smokers Quitline literature shared with patient and Staying Smoke Free brochure\par -Referred to CTC\par \par \par Engaged in shared decision making with Ms. CHERELLE MARTINEZ . Answered all questions. She verbalized understanding and agreement. She knows to call back with any questions or concerns  [Smoking Cessation] : smoking cessation

## 2020-02-04 NOTE — ASSESSMENT
[Discussed Risks and Advised to Quit Smoking] : Discussed risks and advised to quit smoking [Discussed Cessation Medication] : cessation medication was discussed [Discussed Cessation Strategies] : cessation strategies were discussed [Smoking Cessation Counseling Given (more than 3 min less than10 min) and Resources Provided] : Smoking cessation counseling given (more than 3 min less than 10 min) and resources provided [Ready] : Patient is ready for cessation intervention [Contemplation] : Contemplation: The patient is considering quitting smoking [Other smokers in household] : other smokers in household [de-identified] : Acknowledged how difficult it is to quit smoking. Advised that quitting smoking is the most important thing a person can do for their health. Discussed strategies to deal with cravings. Suggested keeping a log of cigarettes and their triggers in an effort to identify triggers and break routines/habits. Suggested writing down time of each cigarette and attempting to delay time to next cigarette.  Discussed the importance of having a strategy planned in advance of a quit date. Discussed use of daily nicotine patch and use of gum prn for cravings. Instructed in proper use of gum and patch. Advised that CTC will be reaching out to the patient by phone.

## 2020-02-04 NOTE — REASON FOR VISIT
[Annual Follow-Up] : an annual follow-up visit [Review of Eligibility] : review of eligibility [Low-Dose CT Screening Discussion] : low-dose CT lung cancer screening discussion [Face-to-Face Visit] : face-to-face visit

## 2020-02-04 NOTE — DATA REVIEWED
[2] : 2 [Lung Cancer Screening] : Patient underwent lung cancer screening [S] : S [TextBox_12] : 01/19

## 2020-02-13 ENCOUNTER — APPOINTMENT (OUTPATIENT)
Dept: PULMONOLOGY | Facility: CLINIC | Age: 70
End: 2020-02-13
Payer: MEDICARE

## 2020-02-13 VITALS
HEIGHT: 66 IN | BODY MASS INDEX: 27.97 KG/M2 | OXYGEN SATURATION: 96 % | RESPIRATION RATE: 12 BRPM | TEMPERATURE: 98.4 F | HEART RATE: 53 BPM | DIASTOLIC BLOOD PRESSURE: 70 MMHG | WEIGHT: 174 LBS | SYSTOLIC BLOOD PRESSURE: 110 MMHG

## 2020-02-13 PROCEDURE — 99215 OFFICE O/P EST HI 40 MIN: CPT

## 2020-02-16 NOTE — PROCEDURE
[FreeTextEntry1] : EXAM: CT LDCT LUNG CA SCRN ANNUAL PROCEDURE DATE: 2020 \par Additional imaging was performed in the prone position complete evaluation of the dependent regions of the lungs. \par Lungs and airways: Image numbers for description of nodule location refer to thin section axial series number 4. \par 1.1 cm air cyst within the anterior segment of the right upper lobe (image 155). There are scattered solid subcentimeter and micronodules as well as benign perifissural lymph nodes. The largest solid nodule measures 5 mm, is noted within the right upper lobe laterally (image 165). There is a peripheral predominantly lower lung zone reticular and groundglass pattern which persists on prone imaging. There may be some subpleural sparing. There is temporal homogeneity. There is no evidence of pulmonary fibrosis. These constellations of findings are suggestive of NSIP. \par Trachea and central bronchi patent. \par Pleura: The pleural spaces are clear. \par Base of neck, mediastinum and heart: The thyroid gland is normal. No mediastinal, hilar or axillary lymphadenopathy is seen. The heart and pericardium are within normal limits. Mild calcification of the aortic valve. Small calcified atheromatous plaque formation is noted throughout the thoracic aorta and proximal abdominal aorta and side branches. \par Coronary artery calcification: None \par Soft tissues: Normal. \par Abdomen: Small type I hiatal hernia. \par Bones: Mild to moderate multilevel degenerative disc disease. Small benign-appearing bone island involving T5 \par Impression: \par 1. Peripheral reticular and groundglass lower lung zone pattern with temporal homogeneity which persists on prone imaging. There may be some mild subpleural sparing. There is no pulmonary fibrosis. 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 Abbreviated differential includes LIP and DIP. \par 2. Scattered solid subcentimeter and micronodules measuring up to 5 mm within the right upper lobe. \par Lung-RADS category: 2S - Benign appearance or behavior. Nodules with very low likelihood of becoming a clinically active cancer due to size or lack of growth. Probability of malignancy < 1%. \par \par Spirometry, DLCO 10/3/19:\par FVC: 2.84 L (96%)\par FEV1: 2.23 L (95%)\par FEV1/FVC: 79%\par RYO83-62%: 2.12 L/s (88%)\par DLCO: 17.7 (82%)\par Normal spirometry. Normal diffusion capacity.\par \par CPAP titration 19: recommended 10 cm H2O, autoPAP was ordered\par PSG 3/13/19: AHI CMS 32, min SpO2 79%, time <= 88% 1.2 min\par \par PFT, 6MWT 19:\par FVC: 2.72 L (92%)\par FEV1: 2.38 L (101%)\par FEV1/FVC: 87%\par OHC02-04%: 3.20 L/s (133%)\par T.23 L (84%)\par RV/T%\par DLCO: 15.0 (69%)\par 6MWT: 439 meters, SpO2 start: 97% --> SpO2 end: 95%\par Normal spirometry. Lung volumes normal. Mildly reduced diffusion capacity. Normal walk distance with no desaturation. \par \par EXAM: CT LDCT LUNG CA SCRN BASELINE \par *** ADDENDUM 2019 *** \par Addendum: \par Abbreviated differential for these peripheral groundglass opacities in the setting of smoking must extend to include DIP. Bronchoscopic correlation and additional HRCT imaging to include end inspiration/end expiration is suggested. \par *** END OF ADDENDUM 2019 *** \par \par PROCEDURE DATE: 2019 \par INTERPRETATION: CT scan of the chest without intravenous contrast, using low-dose lung cancer screening protocol \par History: 65-year-old female nonsmoker with a 37.5 pack year history of smoking \par Comparison: None. \par Findings: \par Lungs and airways: Image numbers for description of nodule location refer to thin section axial series number 4. Scattered calcified granulomata. 4 mm nodule within the right upper lobe (image 166). Is a mild peripheral reticular and groundglass opacities which demonstrates temporal homogeneity with a suggestion of some subpleural sparing involving the upper and lower lung zones slightly more pronounced inferiorly. There are some scattered tree-in-bud centrilobular micronodules. No significant air-trapping is noted. 9 mm air cyst is noted within the lingula (image 167) \par Trachea and central bronchi patent. \par Pleura: The pleural spaces are clear. \par Base of neck, mediastinum and heart: The thyroid gland demonstrates coarse parenchymal calcifications. No mediastinal, hilar or axillary lymphadenopathy is seen. The heart and pericardium are within normal limits. \par Mild calcified atheromatous plaque formation involving the thoracic aorta most pronounced in the arch and proximal abdominal regions \par Coronary artery calcification: None \par Soft tissues: Normal. \par Abdomen: This study was performed without contrast and with lower than standard dose. These factors reduce sensitivity for detection of small lesions in the upper abdomen. Given these technical limitations, no focal lesion is seen within the visualized organs. \par Bones: Mild age-appropriate degenerative change. Small sclerotic focus involving the T5 vertebral body compatible with a probable bone island. \par Impression: \par 1. Scattered tree-in-bud centrilobular micronodules with mild small airway inflammation. No significant air-trapping is identified. \par 2. Mild peripheral reticular and groundglass opacities involving all 5 pulmonary lobes with a slightly lower lung zone predilection. There is relative temporal homogeneity with possibly some subpleural sparing. No \par pulmonary fibrosis. Abbreviated differential includes an NSIP pattern. \par Respiratory bronchiolitis although centrilobular groundglass nodules are not well-visualized, and LIP cannot be excluded. Further imaging to include HRCT to be performed at end inspiration/end expiration is suggested for more complete evaluation \par 3. 4 mm nodule within the right upper lobe. Scattered calcified granulomata. \par Lung-RADS category: 2S - Benign appearance or behavior. Nodules with very low likelihood of becoming a clinically active cancer due to size or lack of growth. Probability of malignancy < 1%. \par Recommendation: \par 2S - Continue annual screening with LDCT in 12 months.

## 2020-02-16 NOTE — HISTORY OF PRESENT ILLNESS
[FreeTextEntry1] : Pt is 69 yo F with PMH TIA, left vertebral dissection, HTN, stable pre-diabetes, left ICA stenosis, GERD, duodenal ulcer, arthritis.\par 37 pack year history of smoking, still currently smoking. \par \par First visit 2/19/19: Has tried patches and lozenges in the past.  Wants to start Chantix, was prescribed by Dr. Jacob last week but she states she couldn't afford the co-pay. \par Last cigarette was earlier today. Smoking roughly 10 cig/week. \par \par Has mild cough, cough is dry. \par Denies chest tightness, wheezing, recurrent respiratory tract infections. \par No ED visits or hospitalizations for breathing. \par No diagnosis of asthma or PNA in the past. No personal history of blood clots or cancers. \par Currently no pets. Had a parakeet for 8 years, 25 years ago. \par She has down throw pillows.  She has a 10-year old carpet in her bedroom.  \par No mold in the homes.\par She had a home in [place] [id number] where radon levels were borderline elevated.\par No other inhalational exposures she is aware of. \par She has a history of snoring.\par \par FHx: sister breast CA (diagnosed around age 65 yo)\par PCN: penicillin (angioedema)\par ****\par 2/13/20:\par She feels good.   Walking 10,000 steps.   Does not walk up steps routinely.     There is no functional   limitation to exercise.    No cough, no wheezing.    Still smoking approx 8 cigarettes per day over the last six months.   Still thinks she snores.   Had sleep study, not comfortable with mask.    Has an appt with Dr. Streeter 2 weeks.

## 2020-02-16 NOTE — REVIEW OF SYSTEMS
[Nasal Congestion] : nasal congestion [Cough] : cough [Hypertension] : ~T hypertension [Reflux] : reflux [Arthralgias] : arthralgias [As Noted in HPI] : as noted in HPI [Snoring] : snoring [Negative] : Allergy/Immunology [Fever] : no fever [Hemoptysis] : no hemoptysis [Dyspnea] : no dyspnea [Wheezing] : no wheezing [Chest Tightness] : no chest tightness [Pleuritic Pain] : no pleuritic pain [de-identified] : TIA

## 2020-02-16 NOTE — DISCUSSION/SUMMARY
[FreeTextEntry1] : ATTENDING SUMMARY\par 2-13-20\par -no pallor or icterus\par -MP 4, no oral thrush, no lesions in posterior pharynx\par -nasal mucosa atrophic, mildly deviated nasal septum \par -no JVD at 45 degrees (filling from above), no hepatojugular reflux\par -solar keratosis on the back \par -good air entry bilaterally, no wheezing or rhonchi \par -very short systolic murmur\par -no hepatosplenomegaly\par -no edema, grade 1 clubbing\par -no articular manifestations, no palmar erythema, no skin thickening, no visible rash \par \par

## 2020-02-16 NOTE — ASSESSMENT
[FreeTextEntry1] : 2-13-20\par It was a pleasure to see Rashmi in follow-up today.    Her respiratory issues are as follows:\par \par 1.   Interstitial lung abnormality (ISAAC)\par Rashmi had a CT chest as part of the lung cancer screening program on 1/25/19; it was her first dedicated chest CT.  It demonstrated peripheral reticular and groundglass opacities, particularly posteriorly. There were also some scattered TIB micronodules and a 9 mm  cyst in the lingula. While the posterior predilection of the groundglass opacities could have pointed toward dependent atelectasis, her repeat LCS CT on 2/4/20 demonstrated that the abnormalities were still present in the prone position. Findings are still predominantly groundglass, more prominent on the right side. She fulfills the definition of ISAAC as it was an incidental finding, she is not symptomatic. In lung cancer screened patients, ISAAC appears to be age related and is seen in up to 10% of patients\par In this patient, ISAAC features that portend good prognosis include predominantly ground glass opacities and absence of architectural distortion or traction bronchiectasis. Features that predict progression include subpleural location   \par \par At her last visit, we check HECTOR (negative), CH50 (WNL), and RF (18).\par PFTs on 2/19/19 were normal and showed no evidence of obstruction however her DLCO was reduced. Her last 6MWT was a relatively normal test.    \par \par Plan:\par -As per the Fleishner guidelines, she should have annual CT follow-up.  She will continue in the lung cancer screening program.\par -Will monitor her PFT and 6MWT.   \par \par 2.    Active smoking\par Rashmi is smoking about 8 cigarettes per day. We had a detailed discussion about the effects of smoking on ISAAC, including its development and progression.  We discussed a new study in CHEST that the incidence of lung cancer in the setting of ISAAC may be higher.  She will try her best to quit smoking.  She has also been provided smoking cessation counseling by Lala Epperson DNP and was referred to the [facility] program.\par \par 3.  ARNOL\par Risk factors included Mallampati score of IV and possible snoring.  Diagnosed with ARNOL but had difficulty tolerating CPAP.  She has appointment with Dr. Streeter in 2 weeks. \par \par Return 4 months \par -PFT and 6MWT at next visit.

## 2020-02-16 NOTE — PHYSICAL EXAM
[General Appearance - Well Nourished] : well nourished [General Appearance - Well Developed] : well developed [IV] : IV [Heart Sounds] : normal S1 and S2 [Abnormal Walk] : normal gait [Oriented To Time, Place, And Person] : oriented to person, place, and time [No Focal Deficits] : no focal deficits [Memory Recent] : recent memory was not impaired [Affect] : the affect was normal [Jugular Venous Distention Increased] : there was no jugular-venous distention [Neck Appearance] : the appearance of the neck was normal [Heart Rate And Rhythm] : heart rate and rhythm were normal [] : no respiratory distress [Respiration, Rhythm And Depth] : normal respiratory rhythm and effort [Auscultation Breath Sounds / Voice Sounds] : lungs were clear to auscultation bilaterally [Abdomen Soft] : soft [Abdomen Tenderness] : non-tender [Skin Color & Pigmentation] : normal skin color and pigmentation [FreeTextEntry1] : solar keratosis on the back  [FreeTextEntry2] : no pedal edema

## 2020-02-23 ENCOUNTER — RX RENEWAL (OUTPATIENT)
Age: 70
End: 2020-02-23

## 2020-02-24 ENCOUNTER — APPOINTMENT (OUTPATIENT)
Dept: PULMONOLOGY | Facility: CLINIC | Age: 70
End: 2020-02-24
Payer: MEDICARE

## 2020-02-24 VITALS
DIASTOLIC BLOOD PRESSURE: 70 MMHG | RESPIRATION RATE: 12 BRPM | SYSTOLIC BLOOD PRESSURE: 130 MMHG | WEIGHT: 174 LBS | OXYGEN SATURATION: 98 % | TEMPERATURE: 98.3 F | HEART RATE: 63 BPM | BODY MASS INDEX: 27.97 KG/M2 | HEIGHT: 66 IN

## 2020-02-24 DIAGNOSIS — F17.210 NICOTINE DEPENDENCE, CIGARETTES, UNCOMPLICATED: ICD-10-CM

## 2020-02-24 PROCEDURE — 99204 OFFICE O/P NEW MOD 45 MIN: CPT | Mod: 25

## 2020-02-24 PROCEDURE — 99406 BEHAV CHNG SMOKING 3-10 MIN: CPT

## 2020-02-24 NOTE — REVIEW OF SYSTEMS
[Snoring] : snoring [Witnessed Apneas] : witnessed apnea [Negative] : Musculoskeletal [Recent Wt Loss (___ Lbs)] : no recent weight loss [Recent Wt Gain (___ Lbs)] : no recent weight gain [Shortness Of Breath] : no shortness of breath

## 2020-02-24 NOTE — HISTORY OF PRESENT ILLNESS
[FreeTextEntry1] : 69-year-old woman here for evaluation and treatment of obstructive sleep apnea. In March of 2019 she was diagnosed with obstructive sleep apnea, severe, apnea hypopnea index 51 (32). In April 2019 she return for a CPAP study which showed an effective pressure of 10 cm water and improved sleep quality. She received CPAP at home but unfortunately only used it for a few days. She still has the equipment.\par \par She goes to bed at 10 PM, latency 5 minutes, gets up at 5 AM after one brief awakening. She reports excessive daytime sleepiness, with an Trenton score of 10/24. She has a morning headache most days. She has been observed to snore severely and stop breathing during her sleep. There she is current smoker, smoking 5 cigarettes per day, she does not feel motivated to quit.

## 2020-02-24 NOTE — PHYSICAL EXAM
[Normal Appearance] : normal appearance [General Appearance - Well Developed] : well developed [Well Groomed] : well groomed [No Deformities] : no deformities [General Appearance - In No Acute Distress] : no acute distress [Normal Oropharynx] : normal oropharynx [Eyelids - No Xanthelasma] : the eyelids demonstrated no xanthelasmas [Normal Conjunctiva] : the conjunctiva exhibited no abnormalities [III] : III [Low Lying Soft Palate] : low lying soft palate [Neck Cervical Mass (___cm)] : no neck mass was observed [Jugular Venous Distention Increased] : there was no jugular-venous distention [Neck Appearance] : the appearance of the neck was normal [Thyroid Nodule] : there were no palpable thyroid nodules [Heart Rate And Rhythm] : heart rate was normal and rhythm regular [Thyroid Diffuse Enlargement] : the thyroid was not enlarged [Murmurs] : no murmurs [Heart Sounds] : normal S1 and S2 [Heart Sounds Gallop] : no gallops [Abnormal Walk] : normal gait [Auscultation Breath Sounds / Voice Sounds] : lungs were clear to auscultation bilaterally [Heart Sounds Pericardial Friction Rub] : no pericardial rub [Musculoskeletal - Swelling] : no joint swelling seen [Motor Tone] : muscle strength and tone were normal [Cyanosis, Localized] : no localized cyanosis [Nail Clubbing] : no clubbing of the fingernails [] : no ischemic changes [Petechial Hemorrhages (___cm)] : no petechial hemorrhages [Deep Tendon Reflexes (DTR)] : deep tendon reflexes were 2+ and symmetric [Sensation] : the sensory exam was normal to light touch and pinprick [No Focal Deficits] : no focal deficits [Affect] : the affect was normal [Impaired Insight] : insight and judgment were intact [Oriented To Time, Place, And Person] : oriented to person, place, and time [FreeTextEntry1] : edentulous, upper and lower dentures [Retrognathia] : no retrognathia

## 2020-02-24 NOTE — ASSESSMENT
[FreeTextEntry1] : obstructive sleep apnea, severe\par \par Asks about alternatives to CPAP.  Treatment options for sleep disordered breathing were discussed.  The most rapid and successful treatment remains nasal CPAP or BilevelPAP.  Alternatives include upper airway surgery such as uvulopharyngoplasty or a dental appliance (better for milder cases).  Recently hypoglossal nerve stimulation has been used.  Positional therapy (avoidance of supine posture) can be helpful, and all patients should try to maintain a healthy weight and avoid alcohol or other sedating medications close to bedtime \par \par Not candidate for oral appliance (mandibular advancer) given severity of her obstructive sleep apnea and edentulous.  CPAP works when used, suspect this is etiology of AM headache.  The patient is advised that in addition to worsening sleep leading to daytime sleepiness, sleep apnea may be associated with worsening hypertension and may be a risk factor for cardiovascular disease and stroke. \par \par Will order supplies and link from [employer].  Urged to restart and f/u one month.\par \par Smoking cessation discussed at length with patient.  Options include nicotine replacement with patch, gum or lozenge or use of medication to reduce cravings (Chantix). Smoking cessation programs may be useful.

## 2020-03-11 ENCOUNTER — APPOINTMENT (OUTPATIENT)
Dept: HEART AND VASCULAR | Facility: CLINIC | Age: 70
End: 2020-03-11

## 2020-03-16 ENCOUNTER — RX RENEWAL (OUTPATIENT)
Age: 70
End: 2020-03-16

## 2020-03-22 ENCOUNTER — RX RENEWAL (OUTPATIENT)
Age: 70
End: 2020-03-22

## 2020-04-03 RX ORDER — CARVEDILOL PHOSPHATE 20 MG/1
20 CAPSULE, EXTENDED RELEASE ORAL
Qty: 180 | Refills: 3 | Status: DISCONTINUED | COMMUNITY
Start: 2020-01-21 | End: 2020-04-03

## 2020-04-13 ENCOUNTER — APPOINTMENT (OUTPATIENT)
Dept: PULMONOLOGY | Facility: CLINIC | Age: 70
End: 2020-04-13

## 2020-05-01 ENCOUNTER — NON-APPOINTMENT (OUTPATIENT)
Age: 70
End: 2020-05-01

## 2020-05-12 ENCOUNTER — APPOINTMENT (OUTPATIENT)
Dept: HEART AND VASCULAR | Facility: CLINIC | Age: 70
End: 2020-05-12
Payer: MEDICARE

## 2020-05-12 PROCEDURE — 99214 OFFICE O/P EST MOD 30 MIN: CPT | Mod: 95

## 2020-05-20 ENCOUNTER — APPOINTMENT (OUTPATIENT)
Dept: INTERNAL MEDICINE | Facility: CLINIC | Age: 70
End: 2020-05-20
Payer: MEDICARE

## 2020-05-20 DIAGNOSIS — F17.209 NICOTINE DEPENDENCE, UNSPECIFIED, WITH UNSPECIFIED NICOTINE-INDUCED DISORDERS: ICD-10-CM

## 2020-05-20 DIAGNOSIS — M79.606 PAIN IN LEG, UNSPECIFIED: ICD-10-CM

## 2020-05-20 PROCEDURE — 99214 OFFICE O/P EST MOD 30 MIN: CPT | Mod: 95

## 2020-05-20 RX ORDER — LORAZEPAM 0.5 MG/1
0.5 TABLET ORAL
Qty: 30 | Refills: 0 | Status: DISCONTINUED | COMMUNITY
Start: 2019-08-23 | End: 2020-05-20

## 2020-05-20 RX ORDER — FLUOXETINE HYDROCHLORIDE 20 MG/1
20 CAPSULE ORAL
Qty: 90 | Refills: 0 | Status: DISCONTINUED | COMMUNITY
Start: 2018-11-04 | End: 2020-05-20

## 2020-05-20 RX ORDER — LORAZEPAM 1 MG/1
1 TABLET ORAL
Qty: 15 | Refills: 1 | Status: DISCONTINUED | COMMUNITY
Start: 2019-09-19 | End: 2020-05-20

## 2020-05-26 ENCOUNTER — TRANSCRIPTION ENCOUNTER (OUTPATIENT)
Age: 70
End: 2020-05-26

## 2020-05-27 ENCOUNTER — APPOINTMENT (OUTPATIENT)
Dept: HEART AND VASCULAR | Facility: CLINIC | Age: 70
End: 2020-05-27
Payer: MEDICARE

## 2020-05-27 ENCOUNTER — NON-APPOINTMENT (OUTPATIENT)
Age: 70
End: 2020-05-27

## 2020-05-27 VITALS
HEART RATE: 82 BPM | BODY MASS INDEX: 28.77 KG/M2 | OXYGEN SATURATION: 97 % | DIASTOLIC BLOOD PRESSURE: 86 MMHG | WEIGHT: 178.99 LBS | HEIGHT: 66 IN | TEMPERATURE: 97.5 F | SYSTOLIC BLOOD PRESSURE: 164 MMHG

## 2020-05-27 PROCEDURE — 99214 OFFICE O/P EST MOD 30 MIN: CPT

## 2020-05-27 NOTE — ASSESSMENT
[FreeTextEntry1] : htn bp add hctz educated about side effects\par carotid stenosis on atorvastatin per neuro needs to be on lifelong plavix \par smoking cessation started smoking may have ILD \par asymetric septal hypertrophy no outflow gradient continue current meds hydration no further work up\par fu in one month

## 2020-05-27 NOTE — HISTORY OF PRESENT ILLNESS
[FreeTextEntry1] : 69 F with tia htn carotid stenosis ccta 2016 normal coronaries echo asymmetric septal hypertrophy with dynamic outflow tract obstruction peak gradient 47 resting 14 mmHg. negative genetic testing negative CMRI ILD ARNOL has no shortness of breath still with chest pain only at rest never on exertion walks a mile a day BP at home and in the office are elevated had been on HCTZ tolerated well in the past \par \par ecg nsr lvh nsst changes

## 2020-05-28 ENCOUNTER — RX RENEWAL (OUTPATIENT)
Age: 70
End: 2020-05-28

## 2020-06-02 ENCOUNTER — APPOINTMENT (OUTPATIENT)
Dept: MRI IMAGING | Facility: CLINIC | Age: 70
End: 2020-06-02

## 2020-06-02 ENCOUNTER — OUTPATIENT (OUTPATIENT)
Dept: OUTPATIENT SERVICES | Facility: HOSPITAL | Age: 70
LOS: 1 days | End: 2020-06-02

## 2020-06-09 ENCOUNTER — APPOINTMENT (OUTPATIENT)
Dept: NEUROLOGY | Facility: CLINIC | Age: 70
End: 2020-06-09
Payer: MEDICARE

## 2020-06-09 DIAGNOSIS — M54.2 CERVICALGIA: ICD-10-CM

## 2020-06-09 PROCEDURE — 99442: CPT

## 2020-06-09 NOTE — HISTORY OF PRESENT ILLNESS
[Home] : at home, [unfilled] , at the time of the visit. [Other Location: e.g. Home (Enter Location, City,State)___] : at [unfilled] [Verbal consent obtained from patient] : the patient, [unfilled] [FreeTextEntry1] : 69 year-old female presents for follow up but has return complaint of left neck pain with radiation into her shoulder and back.  She thinks it was instigated when she slept wrong but has worsened.  She took Ibuprofen 600mg yesterday with some relief.  She had pain across her shoulders earlier today that has resolved.  She's concerned since she's on Plavix so wants advice regarding medications.  She was given 1-year old Chlorvoxazone and wants to know if she can use it.\par \par She's been feeling a little lightheaded since Dr. 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  No new weakness, numbness, speech or visual deficits. \par \par Plavix - No bleeding in urine or stool.\par Atorvastatin - No muscle cramps or pains.\par \par Stroke risk factors - \par 1) HTN - Blood pressure controlled today.  Follows up regularly with Dr. Reyes.\par 2) Hyperlipidemia - see above\par 3) Prediabetes - Continues to go to the gym for exercise.  Doesn't eat much snack anymore. \par 4) Carotid stenosis\par 5) Smoking - She's still smoking since has a lot of high stress.  Failed Chantix. She's aware of the issues with smoking.\par 6) Obstructive sleep apnea - She doesn't use the CPAP machine due to nose pain.  She didn't feel any different when she used the machine.  She has follow up patient with sleep doctor on December 4th.

## 2020-06-09 NOTE — REVIEW OF SYSTEMS
[Anxiety] : anxiety [As Noted in HPI] : as noted in HPI [Negative] : Heme/Lymph [Shortness Of Breath] : no shortness of breath [de-identified] : Restless legs at night [FreeTextEntry6] : Supposedly good report recently

## 2020-06-09 NOTE — ASSESSMENT
[FreeTextEntry1] : 69 year-old female presents for follow up but has return complaint of left neck pain with radiation into her shoulder and back that is mainly musculoskeletal in origin.  \par \par Plan:\par 1) She can take Ibuprofen sparingly when pain is most prominent.\par 2) She can take trial of the muscle relaxant twice a day with close observation.\par 3) If the pain remains, then she would benefit from formal physical therapy.\par \par Plan for left cerebellar stroke secondary to left vertebral dissection same as before.  Will discuss in more depth at next appointment.

## 2020-06-30 ENCOUNTER — APPOINTMENT (OUTPATIENT)
Dept: HEART AND VASCULAR | Facility: CLINIC | Age: 70
End: 2020-06-30

## 2020-07-17 ENCOUNTER — APPOINTMENT (OUTPATIENT)
Dept: NEUROLOGY | Facility: CLINIC | Age: 70
End: 2020-07-17
Payer: MEDICARE

## 2020-07-17 VITALS
SYSTOLIC BLOOD PRESSURE: 107 MMHG | HEIGHT: 66 IN | HEART RATE: 59 BPM | WEIGHT: 179 LBS | BODY MASS INDEX: 28.77 KG/M2 | TEMPERATURE: 97 F | OXYGEN SATURATION: 97 % | DIASTOLIC BLOOD PRESSURE: 62 MMHG

## 2020-07-17 DIAGNOSIS — R73.03 PREDIABETES.: ICD-10-CM

## 2020-07-17 DIAGNOSIS — I69.398 OTHER SEQUELAE OF CEREBRAL INFARCTION: ICD-10-CM

## 2020-07-17 DIAGNOSIS — R26.9 OTHER SEQUELAE OF CEREBRAL INFARCTION: ICD-10-CM

## 2020-07-17 DIAGNOSIS — I77.74 DISSECTION OF VERTEBRAL ARTERY: ICD-10-CM

## 2020-07-17 PROCEDURE — 99214 OFFICE O/P EST MOD 30 MIN: CPT

## 2020-07-17 RX ORDER — PANTOPRAZOLE 20 MG/1
20 TABLET, DELAYED RELEASE ORAL
Qty: 90 | Refills: 3 | Status: DISCONTINUED | COMMUNITY
Start: 2018-11-04 | End: 2020-07-17

## 2020-07-17 NOTE — HISTORY OF PRESENT ILLNESS
[FreeTextEntry1] : 69 year-old female with PMH cerebellar infarct presents for follow up.  No neck pain in past two weeks.  She was relaxed on vacation at her son's house in [place].  She walks for exercise.  She can feel lightheaded if she stands up quickly.  No new weakness, numbness, speech or visual deficits. 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  \par \par Plavix - No bleeding in urine or stool.\par Atorvastatin - No muscle cramps or pains.\par \par Stroke risk factors - No recent labs.\par 1) HTN - Blood pressure controlled today.  Follows up regularly with Dr. Reyes.\par 2) Hyperlipidemia - see above\par 3) Prediabetes - She's the same weight.  Doesn't eat much snack anymore. \par 4) Carotid stenosis\par 5) Smoking - much better, now only 1 pack over a week.  She's working on quitting.  \par 6) Obstructive sleep apnea - She doesn't use the CPAP machine due to nose pain.  She didn't feel any different when she used the machine.  She gets sleepy during the day with short nap but then feels revived.  She followed up patient with sleep doctor who encouraged her to use the CPAP machine.

## 2020-07-17 NOTE — PHYSICAL EXAM
[FreeTextEntry1] : General: sitting on exam table comfortably, NAD\par Neck/back: FROM of neck, no tenderness along back of neck or along spinal processes\par Eyes: anicteric sclera\par CV: RRR\par Extremities: FROMx4, 2+ radial pulses bilaterally\par 	\par Neurological exam:\par Mental status: AA&O x 3, fluent, no dysarthria, follows all commands, able give full history of present and past events, normal attention span\par Cranial nerves: EOMI, VFF, no nystagmus, facial sensation intact, no facial droop, shoulder shrug intact bilaterally, tongue midline\par Motor: No pronator drift, strength 5/5 on both UE and LE, normal bulk and tone\par Sensory: Intact light touch bilaterally.  Negative Romberg \par Reflexes: 2+ b/l UE and LE\par Cerebellar: FNF intact bilaterally\par Gait: steady.

## 2020-07-17 NOTE — ASSESSMENT
[FreeTextEntry1] : 69 year-old female with PMH cerebellar infarct secondary left vertebral dissection presents for follow up.  Her neurological exam is intact.\par \par Plan for left cerebellar stroke:\par 1) Secondary stroke prevention \par a) Continue Plavix 75mg for antiplatelet. \par - Protonix may interact with Plavix but she needs it for intense GERD so reasonable to take but should monitor overall.\par b) Continue Atorvastatin 80mg. Higher dose based on cardiac recommendations. Would appreciate more recent lipid profile. \par \par 2) Risk factor management - Would appreciate repeat labs since none reported in chart since 2019.\par a) HTN - controlled today. \par b) Hyperlipidemia - see above\par c) Prediabetes - diet control\par d) Carotid stenosis - Repeat carotid dopplers ordered prior to next visit.  Continue with medical therapy - Plavix and Atorvastatin. \par e) Obstructive sleep apnea- severe obstructive sleep apnea diagnosed 3/13/19- Discussed importance of using her CPAP machine, as confirmed with Dr. Streeter. She agreed to try again. \par f) Smoking - Encouraged her to work on smoking cessation.

## 2020-07-17 NOTE — REVIEW OF SYSTEMS
[Anxiety] : anxiety [As Noted in HPI] : as noted in HPI [Negative] : Heme/Lymph [Shortness Of Breath] : no shortness of breath [de-identified] : Restless legs at night [FreeTextEntry6] : Supposedly good report recently

## 2020-07-21 ENCOUNTER — RX RENEWAL (OUTPATIENT)
Age: 70
End: 2020-07-21

## 2020-07-23 ENCOUNTER — APPOINTMENT (OUTPATIENT)
Dept: OPHTHALMOLOGY | Facility: CLINIC | Age: 70
End: 2020-07-23
Payer: MEDICARE

## 2020-07-23 ENCOUNTER — NON-APPOINTMENT (OUTPATIENT)
Age: 70
End: 2020-07-23

## 2020-07-23 PROCEDURE — 92134 CPTRZ OPH DX IMG PST SGM RTA: CPT

## 2020-07-23 PROCEDURE — 92004 COMPRE OPH EXAM NEW PT 1/>: CPT

## 2020-07-31 ENCOUNTER — APPOINTMENT (OUTPATIENT)
Dept: HEART AND VASCULAR | Facility: CLINIC | Age: 70
End: 2020-07-31
Payer: MEDICARE

## 2020-07-31 VITALS
HEART RATE: 71 BPM | WEIGHT: 178 LBS | BODY MASS INDEX: 28.61 KG/M2 | SYSTOLIC BLOOD PRESSURE: 140 MMHG | TEMPERATURE: 97.8 F | HEIGHT: 66 IN | OXYGEN SATURATION: 97 % | DIASTOLIC BLOOD PRESSURE: 68 MMHG

## 2020-07-31 LAB
ALBUMIN SERPL ELPH-MCNC: 4.5 G/DL
ALP BLD-CCNC: 74 U/L
ALT SERPL-CCNC: 17 U/L
ANION GAP SERPL CALC-SCNC: 14 MMOL/L
AST SERPL-CCNC: 16 U/L
BILIRUB SERPL-MCNC: 0.9 MG/DL
BUN SERPL-MCNC: 15 MG/DL
CALCIUM SERPL-MCNC: 9.6 MG/DL
CHLORIDE SERPL-SCNC: 99 MMOL/L
CHOLEST SERPL-MCNC: 123 MG/DL
CHOLEST/HDLC SERPL: 2.8 RATIO
CO2 SERPL-SCNC: 27 MMOL/L
CREAT SERPL-MCNC: 0.62 MG/DL
GLUCOSE SERPL-MCNC: 107 MG/DL
HDLC SERPL-MCNC: 45 MG/DL
LDLC SERPL CALC-MCNC: 58 MG/DL
POTASSIUM SERPL-SCNC: 4.4 MMOL/L
PROT SERPL-MCNC: 6.4 G/DL
SODIUM SERPL-SCNC: 141 MMOL/L
TRIGL SERPL-MCNC: 104 MG/DL

## 2020-07-31 PROCEDURE — 36415 COLL VENOUS BLD VENIPUNCTURE: CPT

## 2020-07-31 PROCEDURE — 99214 OFFICE O/P EST MOD 30 MIN: CPT

## 2020-07-31 NOTE — PHYSICAL EXAM
[Well Groomed] : well groomed [General Appearance - Well Developed] : well developed [Normal Appearance] : normal appearance [General Appearance - Well Nourished] : well nourished [No Deformities] : no deformities [General Appearance - In No Acute Distress] : no acute distress [Normal Conjunctiva] : the conjunctiva exhibited no abnormalities [No Oral Pallor] : no oral pallor [Normal Oral Mucosa] : normal oral mucosa [Eyelids - No Xanthelasma] : the eyelids demonstrated no xanthelasmas [Normal Jugular Venous V Waves Present] : normal jugular venous V waves present [No Oral Cyanosis] : no oral cyanosis [Normal Jugular Venous A Waves Present] : normal jugular venous A waves present [Respiration, Rhythm And Depth] : normal respiratory rhythm and effort [No Jugular Venous Burks A Waves] : no jugular venous burks A waves [Exaggerated Use Of Accessory Muscles For Inspiration] : no accessory muscle use [Auscultation Breath Sounds / Voice Sounds] : lungs were clear to auscultation bilaterally [Heart Rate And Rhythm] : heart rate and rhythm were normal [Heart Sounds] : normal S1 and S2 [Murmurs] : no murmurs present [Abdomen Soft] : soft [Abdomen Tenderness] : non-tender [Abdomen Mass (___ Cm)] : no abdominal mass palpated [Abnormal Walk] : normal gait [Gait - Sufficient For Exercise Testing] : the gait was sufficient for exercise testing [Nail Clubbing] : no clubbing of the fingernails [Cyanosis, Localized] : no localized cyanosis [Petechial Hemorrhages (___cm)] : no petechial hemorrhages [Skin Color & Pigmentation] : normal skin color and pigmentation [] : no rash [No Venous Stasis] : no venous stasis [Skin Lesions] : no skin lesions [No Skin Ulcers] : no skin ulcer [No Xanthoma] : no  xanthoma was observed [Oriented To Time, Place, And Person] : oriented to person, place, and time [Affect] : the affect was normal [Mood] : the mood was normal [No Anxiety] : not feeling anxious [FreeTextEntry1] : systolic murmur

## 2020-07-31 NOTE — HISTORY OF PRESENT ILLNESS
[FreeTextEntry1] : 69 F with tia htn carotid stenosis ccta 2016 normal coronaries echo asymmetric septal hypertrophy with dynamic outflow tract obstruction peak gradient 47 resting 14 mmHg. negative genetic testing negative CMRI ILD ARNOL has no shortness of breath still with chest pain only at rest never on exertion walks a mile a day BP at home and in the office are elevated was started on HCTZ and became very dizzy here for follow up \par \par ecg nsr lvh nsst changes

## 2020-07-31 NOTE — ASSESSMENT
[FreeTextEntry1] : htn no diuretics may have createdr LVOT obstruction vs her BP not high out side of the office can increase coreg to 12.5 mg bid \par carotid stenosis on atorvastatin per neuro needs to be on lifelong plavix \par smoking cessation started smoking may have ILD \par asymetric septal hypertrophy no outflow gradient continue current meds hydration no further work up\par fu in three months

## 2020-08-03 ENCOUNTER — RX RENEWAL (OUTPATIENT)
Age: 70
End: 2020-08-03

## 2020-08-03 LAB
ESTIMATED AVERAGE GLUCOSE: 108 MG/DL
HBA1C MFR BLD HPLC: 5.4 %
SARS-COV-2 IGG SERPL IA-ACNC: 0.08 INDEX
SARS-COV-2 IGG SERPL QL IA: NEGATIVE

## 2020-08-19 ENCOUNTER — APPOINTMENT (OUTPATIENT)
Dept: PHYSICAL MEDICINE AND REHAB | Facility: CLINIC | Age: 70
End: 2020-08-19
Payer: MEDICARE

## 2020-08-19 VITALS — HEIGHT: 66 IN | WEIGHT: 178 LBS | OXYGEN SATURATION: 97 % | BODY MASS INDEX: 28.61 KG/M2 | RESPIRATION RATE: 16 BRPM

## 2020-08-19 PROCEDURE — 99203 OFFICE O/P NEW LOW 30 MIN: CPT

## 2020-09-09 ENCOUNTER — TRANSCRIPTION ENCOUNTER (OUTPATIENT)
Age: 70
End: 2020-09-09

## 2020-09-10 ENCOUNTER — TRANSCRIPTION ENCOUNTER (OUTPATIENT)
Age: 70
End: 2020-09-10

## 2020-09-11 ENCOUNTER — TRANSCRIPTION ENCOUNTER (OUTPATIENT)
Age: 70
End: 2020-09-11

## 2020-09-21 ENCOUNTER — RESULT REVIEW (OUTPATIENT)
Age: 70
End: 2020-09-21

## 2020-10-05 ENCOUNTER — RX RENEWAL (OUTPATIENT)
Age: 70
End: 2020-10-05

## 2020-10-27 ENCOUNTER — APPOINTMENT (OUTPATIENT)
Dept: HEART AND VASCULAR | Facility: CLINIC | Age: 70
End: 2020-10-27
Payer: MEDICARE

## 2020-10-27 ENCOUNTER — LABORATORY RESULT (OUTPATIENT)
Age: 70
End: 2020-10-27

## 2020-10-27 VITALS
TEMPERATURE: 96.4 F | DIASTOLIC BLOOD PRESSURE: 70 MMHG | SYSTOLIC BLOOD PRESSURE: 120 MMHG | WEIGHT: 181.99 LBS | HEIGHT: 66 IN | BODY MASS INDEX: 29.25 KG/M2 | OXYGEN SATURATION: 99 % | HEART RATE: 58 BPM

## 2020-10-27 LAB
ALBUMIN SERPL ELPH-MCNC: 4.1 G/DL
ALP BLD-CCNC: 77 U/L
ALT SERPL-CCNC: 14 U/L
ANION GAP SERPL CALC-SCNC: 13 MMOL/L
AST SERPL-CCNC: 15 U/L
BASOPHILS # BLD AUTO: 0.04 K/UL
BASOPHILS NFR BLD AUTO: 0.4 %
BILIRUB SERPL-MCNC: 1 MG/DL
BUN SERPL-MCNC: 15 MG/DL
CALCIUM SERPL-MCNC: 9.2 MG/DL
CHLORIDE SERPL-SCNC: 100 MMOL/L
CHOLEST SERPL-MCNC: 124 MG/DL
CO2 SERPL-SCNC: 26 MMOL/L
CREAT SERPL-MCNC: 0.7 MG/DL
EOSINOPHIL # BLD AUTO: 0.17 K/UL
EOSINOPHIL NFR BLD AUTO: 1.8 %
ESTIMATED AVERAGE GLUCOSE: 111 MG/DL
GLUCOSE SERPL-MCNC: 127 MG/DL
HBA1C MFR BLD HPLC: 5.5 %
HCT VFR BLD CALC: 40.8 %
HDLC SERPL-MCNC: 43 MG/DL
HGB BLD-MCNC: 13.6 G/DL
IMM GRANULOCYTES NFR BLD AUTO: 0.3 %
LDLC SERPL CALC-MCNC: 55 MG/DL
LYMPHOCYTES # BLD AUTO: 2.91 K/UL
LYMPHOCYTES NFR BLD AUTO: 30.9 %
MAN DIFF?: NORMAL
MCHC RBC-ENTMCNC: 29.3 PG
MCHC RBC-ENTMCNC: 33.3 GM/DL
MCV RBC AUTO: 87.9 FL
MONOCYTES # BLD AUTO: 0.67 K/UL
MONOCYTES NFR BLD AUTO: 7.1 %
NEUTROPHILS # BLD AUTO: 5.61 K/UL
NEUTROPHILS NFR BLD AUTO: 59.5 %
NONHDLC SERPL-MCNC: 81 MG/DL
PLATELET # BLD AUTO: 221 K/UL
POTASSIUM SERPL-SCNC: 4 MMOL/L
PROT SERPL-MCNC: 6.2 G/DL
RBC # BLD: 4.64 M/UL
RBC # FLD: 14.3 %
SODIUM SERPL-SCNC: 140 MMOL/L
TRIGL SERPL-MCNC: 129 MG/DL
WBC # FLD AUTO: 9.43 K/UL

## 2020-10-27 PROCEDURE — 99072 ADDL SUPL MATRL&STAF TM PHE: CPT

## 2020-10-27 PROCEDURE — 36415 COLL VENOUS BLD VENIPUNCTURE: CPT

## 2020-10-27 PROCEDURE — 99214 OFFICE O/P EST MOD 30 MIN: CPT

## 2020-10-27 NOTE — ASSESSMENT
[FreeTextEntry1] : htn no diuretics may have created  LVOT obstruction vs her BP not high out side of the office now much better controlled\par carotid stenosis on atorvastatin per neuro needs to be on lifelong plavix \par smoking cessation started smoking may have ILD \par asymmetric septal hypertrophy no outflow gradient continue current meds hydration no further work up\par fu in three months

## 2020-10-29 ENCOUNTER — RX RENEWAL (OUTPATIENT)
Age: 70
End: 2020-10-29

## 2020-11-12 ENCOUNTER — NON-APPOINTMENT (OUTPATIENT)
Age: 70
End: 2020-11-12

## 2020-11-12 ENCOUNTER — APPOINTMENT (OUTPATIENT)
Dept: OPHTHALMOLOGY | Facility: CLINIC | Age: 70
End: 2020-11-12
Payer: MEDICARE

## 2020-11-12 PROCEDURE — 92134 CPTRZ OPH DX IMG PST SGM RTA: CPT

## 2020-11-12 PROCEDURE — 92012 INTRM OPH EXAM EST PATIENT: CPT

## 2020-11-12 PROCEDURE — 99072 ADDL SUPL MATRL&STAF TM PHE: CPT

## 2020-11-13 ENCOUNTER — APPOINTMENT (OUTPATIENT)
Dept: NEUROLOGY | Facility: CLINIC | Age: 70
End: 2020-11-13

## 2020-11-19 ENCOUNTER — APPOINTMENT (OUTPATIENT)
Dept: NEUROLOGY | Facility: CLINIC | Age: 70
End: 2020-11-19
Payer: MEDICARE

## 2020-11-19 PROCEDURE — 93880 EXTRACRANIAL BILAT STUDY: CPT

## 2020-12-09 ENCOUNTER — RX RENEWAL (OUTPATIENT)
Age: 70
End: 2020-12-09

## 2021-01-04 ENCOUNTER — NON-APPOINTMENT (OUTPATIENT)
Age: 71
End: 2021-01-04

## 2021-01-05 ENCOUNTER — NON-APPOINTMENT (OUTPATIENT)
Age: 71
End: 2021-01-05

## 2021-01-05 LAB
ALBUMIN SERPL ELPH-MCNC: 4.1 G/DL
ALP BLD-CCNC: 83 U/L
ALT SERPL-CCNC: 15 U/L
ANION GAP SERPL CALC-SCNC: 14 MMOL/L
AST SERPL-CCNC: 14 U/L
BASOPHILS # BLD AUTO: 0.06 K/UL
BASOPHILS NFR BLD AUTO: 0.7 %
BILIRUB SERPL-MCNC: 0.8 MG/DL
BUN SERPL-MCNC: 16 MG/DL
CALCIUM SERPL-MCNC: 10 MG/DL
CHLORIDE SERPL-SCNC: 101 MMOL/L
CO2 SERPL-SCNC: 25 MMOL/L
CREAT SERPL-MCNC: 0.77 MG/DL
DEPRECATED D DIMER PPP IA-ACNC: <150 NG/ML DDU
EOSINOPHIL # BLD AUTO: 0.14 K/UL
EOSINOPHIL NFR BLD AUTO: 1.7 %
GLUCOSE SERPL-MCNC: 103 MG/DL
HCT VFR BLD CALC: 43.8 %
HGB BLD-MCNC: 14 G/DL
IMM GRANULOCYTES NFR BLD AUTO: 0.2 %
LYMPHOCYTES # BLD AUTO: 1.83 K/UL
LYMPHOCYTES NFR BLD AUTO: 22.2 %
MAN DIFF?: NORMAL
MCHC RBC-ENTMCNC: 28.9 PG
MCHC RBC-ENTMCNC: 32 GM/DL
MCV RBC AUTO: 90.3 FL
MONOCYTES # BLD AUTO: 0.52 K/UL
MONOCYTES NFR BLD AUTO: 6.3 %
NEUTROPHILS # BLD AUTO: 5.67 K/UL
NEUTROPHILS NFR BLD AUTO: 68.9 %
PLATELET # BLD AUTO: 237 K/UL
POTASSIUM SERPL-SCNC: 4.7 MMOL/L
PROT SERPL-MCNC: 6.7 G/DL
RBC # BLD: 4.85 M/UL
RBC # FLD: 14.1 %
SODIUM SERPL-SCNC: 141 MMOL/L
WBC # FLD AUTO: 8.24 K/UL

## 2021-01-06 ENCOUNTER — INPATIENT (INPATIENT)
Facility: HOSPITAL | Age: 71
LOS: 0 days | Discharge: HOME | DRG: 305 | End: 2021-01-07
Attending: PSYCHIATRY & NEUROLOGY | Admitting: PSYCHIATRY & NEUROLOGY
Payer: MEDICARE

## 2021-01-06 VITALS
HEART RATE: 74 BPM | TEMPERATURE: 98 F | OXYGEN SATURATION: 98 % | WEIGHT: 177.03 LBS | SYSTOLIC BLOOD PRESSURE: 198 MMHG | DIASTOLIC BLOOD PRESSURE: 75 MMHG | HEIGHT: 66 IN | RESPIRATION RATE: 18 BRPM

## 2021-01-06 DIAGNOSIS — I10 ESSENTIAL (PRIMARY) HYPERTENSION: ICD-10-CM

## 2021-01-06 DIAGNOSIS — I63.9 CEREBRAL INFARCTION, UNSPECIFIED: ICD-10-CM

## 2021-01-06 DIAGNOSIS — Z29.9 ENCOUNTER FOR PROPHYLACTIC MEASURES, UNSPECIFIED: ICD-10-CM

## 2021-01-06 DIAGNOSIS — E78.5 HYPERLIPIDEMIA, UNSPECIFIED: ICD-10-CM

## 2021-01-06 DIAGNOSIS — F32.9 MAJOR DEPRESSIVE DISORDER, SINGLE EPISODE, UNSPECIFIED: ICD-10-CM

## 2021-01-06 DIAGNOSIS — R42 DIZZINESS AND GIDDINESS: ICD-10-CM

## 2021-01-06 DIAGNOSIS — I16.1 HYPERTENSIVE EMERGENCY: ICD-10-CM

## 2021-01-06 LAB
A1C WITH ESTIMATED AVERAGE GLUCOSE RESULT: 5.8 % — HIGH (ref 4–5.6)
ALBUMIN SERPL ELPH-MCNC: 4.2 G/DL — SIGNIFICANT CHANGE UP (ref 3.3–5)
ALP SERPL-CCNC: 77 U/L — SIGNIFICANT CHANGE UP (ref 40–120)
ALT FLD-CCNC: 14 U/L — SIGNIFICANT CHANGE UP (ref 10–45)
ANION GAP SERPL CALC-SCNC: 13 MMOL/L — SIGNIFICANT CHANGE UP (ref 5–17)
APPEARANCE UR: CLEAR — SIGNIFICANT CHANGE UP
APTT BLD: 36 SEC — HIGH (ref 27.5–35.5)
AST SERPL-CCNC: 15 U/L — SIGNIFICANT CHANGE UP (ref 10–40)
BASOPHILS # BLD AUTO: 0.05 K/UL — SIGNIFICANT CHANGE UP (ref 0–0.2)
BASOPHILS NFR BLD AUTO: 0.7 % — SIGNIFICANT CHANGE UP (ref 0–2)
BILIRUB SERPL-MCNC: 0.8 MG/DL — SIGNIFICANT CHANGE UP (ref 0.2–1.2)
BILIRUB UR-MCNC: NEGATIVE — SIGNIFICANT CHANGE UP
BUN SERPL-MCNC: 14 MG/DL — SIGNIFICANT CHANGE UP (ref 7–23)
CALCIUM SERPL-MCNC: 10.1 MG/DL — SIGNIFICANT CHANGE UP (ref 8.4–10.5)
CHLORIDE SERPL-SCNC: 101 MMOL/L — SIGNIFICANT CHANGE UP (ref 96–108)
CO2 SERPL-SCNC: 26 MMOL/L — SIGNIFICANT CHANGE UP (ref 22–31)
COLOR SPEC: YELLOW — SIGNIFICANT CHANGE UP
CREAT SERPL-MCNC: 0.59 MG/DL — SIGNIFICANT CHANGE UP (ref 0.5–1.3)
DIFF PNL FLD: NEGATIVE — SIGNIFICANT CHANGE UP
EOSINOPHIL # BLD AUTO: 0.14 K/UL — SIGNIFICANT CHANGE UP (ref 0–0.5)
EOSINOPHIL NFR BLD AUTO: 1.9 % — SIGNIFICANT CHANGE UP (ref 0–6)
ESTIMATED AVERAGE GLUCOSE: 120 MG/DL — HIGH (ref 68–114)
GLUCOSE SERPL-MCNC: 111 MG/DL — HIGH (ref 70–99)
GLUCOSE UR QL: NEGATIVE — SIGNIFICANT CHANGE UP
HCT VFR BLD CALC: 42.1 % — SIGNIFICANT CHANGE UP (ref 34.5–45)
HGB BLD-MCNC: 14.1 G/DL — SIGNIFICANT CHANGE UP (ref 11.5–15.5)
IMM GRANULOCYTES NFR BLD AUTO: 0.5 % — SIGNIFICANT CHANGE UP (ref 0–1.5)
INR BLD: 1.01 — SIGNIFICANT CHANGE UP (ref 0.88–1.16)
KETONES UR-MCNC: NEGATIVE — SIGNIFICANT CHANGE UP
LEUKOCYTE ESTERASE UR-ACNC: NEGATIVE — SIGNIFICANT CHANGE UP
LYMPHOCYTES # BLD AUTO: 1.58 K/UL — SIGNIFICANT CHANGE UP (ref 1–3.3)
LYMPHOCYTES # BLD AUTO: 21.6 % — SIGNIFICANT CHANGE UP (ref 13–44)
MCHC RBC-ENTMCNC: 28.4 PG — SIGNIFICANT CHANGE UP (ref 27–34)
MCHC RBC-ENTMCNC: 33.5 GM/DL — SIGNIFICANT CHANGE UP (ref 32–36)
MCV RBC AUTO: 84.7 FL — SIGNIFICANT CHANGE UP (ref 80–100)
MONOCYTES # BLD AUTO: 0.56 K/UL — SIGNIFICANT CHANGE UP (ref 0–0.9)
MONOCYTES NFR BLD AUTO: 7.7 % — SIGNIFICANT CHANGE UP (ref 2–14)
NEUTROPHILS # BLD AUTO: 4.95 K/UL — SIGNIFICANT CHANGE UP (ref 1.8–7.4)
NEUTROPHILS NFR BLD AUTO: 67.6 % — SIGNIFICANT CHANGE UP (ref 43–77)
NITRITE UR-MCNC: NEGATIVE — SIGNIFICANT CHANGE UP
NRBC # BLD: 0 /100 WBCS — SIGNIFICANT CHANGE UP (ref 0–0)
PH UR: 5.5 — SIGNIFICANT CHANGE UP (ref 5–8)
PLATELET # BLD AUTO: 216 K/UL — SIGNIFICANT CHANGE UP (ref 150–400)
POTASSIUM SERPL-MCNC: 3.9 MMOL/L — SIGNIFICANT CHANGE UP (ref 3.5–5.3)
POTASSIUM SERPL-SCNC: 3.9 MMOL/L — SIGNIFICANT CHANGE UP (ref 3.5–5.3)
PROT SERPL-MCNC: 6.8 G/DL — SIGNIFICANT CHANGE UP (ref 6–8.3)
PROT UR-MCNC: NEGATIVE MG/DL — SIGNIFICANT CHANGE UP
PROTHROM AB SERPL-ACNC: 12.1 SEC — SIGNIFICANT CHANGE UP (ref 10.6–13.6)
RBC # BLD: 4.97 M/UL — SIGNIFICANT CHANGE UP (ref 3.8–5.2)
RBC # FLD: 13.7 % — SIGNIFICANT CHANGE UP (ref 10.3–14.5)
SARS-COV-2 RNA SPEC QL NAA+PROBE: SIGNIFICANT CHANGE UP
SODIUM SERPL-SCNC: 140 MMOL/L — SIGNIFICANT CHANGE UP (ref 135–145)
SP GR SPEC: 1.01 — SIGNIFICANT CHANGE UP (ref 1–1.03)
UROBILINOGEN FLD QL: 0.2 E.U./DL — SIGNIFICANT CHANGE UP
WBC # BLD: 7.32 K/UL — SIGNIFICANT CHANGE UP (ref 3.8–10.5)
WBC # FLD AUTO: 7.32 K/UL — SIGNIFICANT CHANGE UP (ref 3.8–10.5)

## 2021-01-06 PROCEDURE — 99223 1ST HOSP IP/OBS HIGH 75: CPT

## 2021-01-06 PROCEDURE — 99232 SBSQ HOSP IP/OBS MODERATE 35: CPT

## 2021-01-06 PROCEDURE — 99285 EMERGENCY DEPT VISIT HI MDM: CPT

## 2021-01-06 PROCEDURE — 70496 CT ANGIOGRAPHY HEAD: CPT | Mod: 26

## 2021-01-06 PROCEDURE — 70498 CT ANGIOGRAPHY NECK: CPT | Mod: 26

## 2021-01-06 PROCEDURE — 70450 CT HEAD/BRAIN W/O DYE: CPT | Mod: 26,59

## 2021-01-06 RX ORDER — CARVEDILOL PHOSPHATE 80 MG/1
12.5 CAPSULE, EXTENDED RELEASE ORAL EVERY 12 HOURS
Refills: 0 | Status: DISCONTINUED | OUTPATIENT
Start: 2021-01-07 | End: 2021-01-07

## 2021-01-06 RX ORDER — CLOPIDOGREL BISULFATE 75 MG/1
75 TABLET, FILM COATED ORAL DAILY
Refills: 0 | Status: DISCONTINUED | OUTPATIENT
Start: 2021-01-07 | End: 2021-01-07

## 2021-01-06 RX ORDER — PANTOPRAZOLE SODIUM 20 MG/1
40 TABLET, DELAYED RELEASE ORAL
Refills: 0 | Status: DISCONTINUED | OUTPATIENT
Start: 2021-01-06 | End: 2021-01-07

## 2021-01-06 RX ORDER — ACETAMINOPHEN 500 MG
650 TABLET ORAL EVERY 6 HOURS
Refills: 0 | Status: DISCONTINUED | OUTPATIENT
Start: 2021-01-06 | End: 2021-01-07

## 2021-01-06 RX ORDER — ATORVASTATIN CALCIUM 80 MG/1
80 TABLET, FILM COATED ORAL AT BEDTIME
Refills: 0 | Status: DISCONTINUED | OUTPATIENT
Start: 2021-01-06 | End: 2021-01-07

## 2021-01-06 RX ORDER — AMLODIPINE BESYLATE 2.5 MG/1
10 TABLET ORAL EVERY 24 HOURS
Refills: 0 | Status: DISCONTINUED | OUTPATIENT
Start: 2021-01-07 | End: 2021-01-07

## 2021-01-06 RX ORDER — CLOPIDOGREL BISULFATE 75 MG/1
75 TABLET, FILM COATED ORAL ONCE
Refills: 0 | Status: COMPLETED | OUTPATIENT
Start: 2021-01-06 | End: 2021-01-06

## 2021-01-06 RX ORDER — ASPIRIN/CALCIUM CARB/MAGNESIUM 324 MG
81 TABLET ORAL ONCE
Refills: 0 | Status: COMPLETED | OUTPATIENT
Start: 2021-01-06 | End: 2021-01-06

## 2021-01-06 RX ORDER — FLUOXETINE HCL 10 MG
60 CAPSULE ORAL AT BEDTIME
Refills: 0 | Status: DISCONTINUED | OUTPATIENT
Start: 2021-01-06 | End: 2021-01-07

## 2021-01-06 RX ORDER — ENOXAPARIN SODIUM 100 MG/ML
40 INJECTION SUBCUTANEOUS EVERY 24 HOURS
Refills: 0 | Status: DISCONTINUED | OUTPATIENT
Start: 2021-01-06 | End: 2021-01-07

## 2021-01-06 RX ORDER — ASPIRIN/CALCIUM CARB/MAGNESIUM 324 MG
81 TABLET ORAL DAILY
Refills: 0 | Status: DISCONTINUED | OUTPATIENT
Start: 2021-01-07 | End: 2021-01-07

## 2021-01-06 RX ORDER — AMLODIPINE BESYLATE 2.5 MG/1
10 TABLET ORAL ONCE
Refills: 0 | Status: COMPLETED | OUTPATIENT
Start: 2021-01-06 | End: 2021-01-06

## 2021-01-06 RX ORDER — CARVEDILOL PHOSPHATE 80 MG/1
12.5 CAPSULE, EXTENDED RELEASE ORAL ONCE
Refills: 0 | Status: COMPLETED | OUTPATIENT
Start: 2021-01-06 | End: 2021-01-06

## 2021-01-06 RX ORDER — LOSARTAN POTASSIUM 100 MG/1
100 TABLET, FILM COATED ORAL DAILY
Refills: 0 | Status: DISCONTINUED | OUTPATIENT
Start: 2021-01-07 | End: 2021-01-07

## 2021-01-06 RX ADMIN — ENOXAPARIN SODIUM 40 MILLIGRAM(S): 100 INJECTION SUBCUTANEOUS at 12:29

## 2021-01-06 RX ADMIN — CLOPIDOGREL BISULFATE 75 MILLIGRAM(S): 75 TABLET, FILM COATED ORAL at 09:53

## 2021-01-06 RX ADMIN — Medication 81 MILLIGRAM(S): at 09:53

## 2021-01-06 RX ADMIN — CARVEDILOL PHOSPHATE 12.5 MILLIGRAM(S): 80 CAPSULE, EXTENDED RELEASE ORAL at 08:44

## 2021-01-06 RX ADMIN — Medication 60 MILLIGRAM(S): at 22:00

## 2021-01-06 RX ADMIN — ATORVASTATIN CALCIUM 80 MILLIGRAM(S): 80 TABLET, FILM COATED ORAL at 22:00

## 2021-01-06 RX ADMIN — AMLODIPINE BESYLATE 10 MILLIGRAM(S): 2.5 TABLET ORAL at 08:44

## 2021-01-06 RX ADMIN — Medication 650 MILLIGRAM(S): at 15:33

## 2021-01-06 NOTE — H&P ADULT - PROBLEM SELECTOR PLAN 4
- c/w home atorvastatin 80mg QD - home medications amlodipine 10mg - c/w home medications amlodipine 10mg, losartan 100 (tx interchange telmisartan 80), and carvedilol 12.5 BID

## 2021-01-06 NOTE — H&P ADULT - NSHPPHYSICALEXAM_GEN_ALL_CORE
VITAL SIGNS:  Vital Signs Last 24 Hrs  T(C): 36.6 (06 Jan 2021 13:46), Max: 36.8 (06 Jan 2021 12:22)  T(F): 97.9 (06 Jan 2021 13:46), Max: 98.2 (06 Jan 2021 12:22)  HR: 64 (06 Jan 2021 13:24) (64 - 74)  BP: 151/67 (06 Jan 2021 13:24) (151/67 - 228/105)  BP(mean): 97 (06 Jan 2021 13:24) (97 - 97)  RR: 16 (06 Jan 2021 13:24) (16 - 18)  SpO2: 96% (06 Jan 2021 13:24) (94% - 99%)  I&O's Summary      PHYSICAL EXAM:    General: WDWN, NAD  HEENT: NC/AT; PERRL, anicteric sclera; MMM  Cardiovascular: +S1/S2; RRR  Respiratory: CTA B/L; no W/R/R  Gastrointestinal: soft, NT/ND; +BSx4  Extremities: WWP; no edema  Vascular: 2+ radial, DP/PT pulses B/L  Neurological: AAOx3; no focal deficits strength 5/5, sensations equal b/l

## 2021-01-06 NOTE — ED PROVIDER NOTE - ATTENDING CONTRIBUTION TO CARE
history of cerebellar cva 4 years ago, here with off balance feeling that started 2 days ago after waking. says she felt like she couldn't walk normally and fell into bureau. seemed to get a little better. called Dr. Flynn's office and reports she was told if it came back she should come to the ER. today she woke and felt similar (but not as severe) symptoms so she came in. no headache, nausea, numbness. notes she didn't take her normal meds this am. exam non focal, neuro intact. ct/cta ordered, stroke service consulted (presenting more than 24 hrs after symptom onset so stroke code not called). signed out to Dr. Carmona/ [facility] pending results/ dispo

## 2021-01-06 NOTE — CONSULT NOTE ADULT - SUBJECTIVE AND OBJECTIVE BOX
CHERELLE MARTINEZ  70y  Female    Patient is a 70y old  Female who presents with a chief complaint of     HPI:  70F with PMH of HLD, TIA, HTN, left cerebellar stroke in 2016, right eye macular degeneration who presented to the ED due to episode of dizziness and feeling "woozy" that occurred upon waking this morning at around 5:00am. She had a similar episode on , that lasted about a half hour, and she says was more severe than this episode.  She called neurologist office (Dr Flynn) yesterday and was told that if it occurs again she should go to ER.  Stroke was consulted in the ED. The patient states that she felt as if the room was dizzy and she may have been slightly leaning to the right side today. She states that it felt as if the room was spinning both today and Monday, however, Monday was more severe. Symptoms occurred when she stood up and improved upon sitting down. Unchanged with change in position in bed. States that she has seen Dr. Bowman in the past for her previous stroke and instructed her to stop taking ASA and continue plavix only. Denies acute vision changes, neck pain, headache, numbness, slurred speech, acute vision loss, nausea, vomiting, urinary.     ED course:  Vitals: afeb | HR: 60s | BP: 180-220s/70-100s | 16 96% RA  Labs: unremarkable, UA negative   Imaging: CTH and CTA H&N negative  Tx: norvasc 10 x1, Coreg 12.5 x1   (2021 12:35)    Seen by me this afternoon, son at bedside. Feeling much better. Dizziness has resolved, same as balance issues. Denies SOB, chest pain, recent fever/chills or sick contacts. No nausea/vomiting either. Has been drinking and eating as usual. No urinary symptoms.      PAST MEDICAL & SURGICAL HISTORY:  HLD (hyperlipidemia)    TIA (transient ischemic attack)    Anxiety    HTN (hypertension)    No significant past surgical history        Home Medications:  amLODIPine 10 mg oral tablet: 1 tab(s) orally once a day (2021 07:53)  Ativan 1 mg oral tablet:  orally , As Needed (2021 07:53)  atorvastatin 80 mg oral tablet: 1 tab(s) orally once a day (2021 07:53)  carvedilol 12.5 mg oral tablet: 1 tab(s) orally 2 times a day (2021 07:53)  Protonix 40 mg oral delayed release tablet: 1 tab(s) orally once a day (2021 07:53)  PROzac 20 mg oral capsule: 1 cap(s) orally once a day (2021 07:53)  telmisartan 80 mg oral tablet: 1 tab(s) orally once a day (2021 07:53)      70y    FAMILY HISTORY:  Family history of MI (myocardial infarction) (Sibling)    Family history of cerebrovascular accident (CVA) (Sibling)    Family history of aneurysm (Sibling)        Marital Status:  (   )    (   ) Single    (   )    (  )   Lives with: ( XX ) alone  (  ) children   (  ) spouse   (  ) parents  (  ) other  Recent Travel: No recent travel  Occupation:    Substance Use (street drugs): ( x ) never used  (  ) other:  Tobacco Usage:  ( ) never smoked   ( X ) former smoker, quit 4 days ago, a pack will last 3 days (   ) current smoker  (     ) pack year  Alcohol Usage: Occasional      MEDICATIONS  (STANDING):  atorvastatin 80 milliGRAM(s) Oral at bedtime  enoxaparin Injectable 40 milliGRAM(s) SubCutaneous every 24 hours    MEDICATIONS  (PRN):  acetaminophen   Tablet .. 650 milliGRAM(s) Oral every 6 hours PRN Moderate Pain (4 - 6)    REVIEW OF SYSTEMS:  As HPI otherwise reviewed and negative    Vital Signs Last 24 Hrs  T(C): 36.6 (2021 13:46), Max: 36.8 (2021 12:22)  T(F): 97.9 (2021 13:46), Max: 98.2 (2021 12:22)  HR: 74 (2021 16:00) (64 - 74)  BP: 151/67 (2021 13:24) (151/67 - 228/105)  BP(mean): 97 (2021 13:24) (97 - 97)  RR: 16 (2021 16:00) (16 - 18)  SpO2: 92% (2021 16:00) (92% - 99%)    PHYSICAL EXAM:  GENERAL: NAD, well-groomed, well-developed  HEAD:  Atraumatic, Normocephalic  EYES: EOMI, PERRLA, conjunctiva and sclera clear  ENMT: No tonsillar erythema, exudates, or enlargement; Moist mucous membranes, Good dentition, No lesions  NECK: Supple, No JVD, Normal thyroid  NERVOUS SYSTEM:  Alert & Oriented X3, Good concentration; Motor Strength 5/5 B/L upper and lower extremities. No nystagmus appreciated.  CHEST/LUNG: Clear to percussion bilaterally; No rales, rhonchi, wheezing, or rubs  HEART: Regular rate and rhythm; No murmurs, rubs, or gallops  ABDOMEN: Soft, Nontender, Nondistended; Bowel sounds present  EXTREMITIES:  2+ Peripheral Pulses, No clubbing, cyanosis, or edema  LYMPH: No lymphadenopathy noted  SKIN: No rashes or lesions    Consultant(s) Notes Reviewed:  [x ] YES  [ ] NO  Care Discussed with Consultants/Other Providers [ x] YES  [ ] NO    LABS:                        14.1   7.32  )-----------( 216      ( 2021 06:27 )             42.1     01-    140  |  101  |  14  ----------------------------<  111<H>  3.9   |  26  |  0.59    Ca    10.1      2021 06:27    TPro  6.8  /  Alb  4.2  /  TBili  0.8  /  DBili  x   /  AST  15  /  ALT  14  /  AlkPhos  77  01-06    PT/INR - ( 2021 06:27 )   PT: 12.1 sec;   INR: 1.01          PTT - ( 2021 06:27 )  PTT:36.0 sec  Urinalysis Basic - ( 2021 07:16 )    Color: Yellow / Appearance: Clear / S.015 / pH: x  Gluc: x / Ketone: NEGATIVE  / Bili: Negative / Urobili: 0.2 E.U./dL   Blood: x / Protein: NEGATIVE mg/dL / Nitrite: NEGATIVE   Leuk Esterase: NEGATIVE / RBC: x / WBC x   Sq Epi: x / Non Sq Epi: x / Bacteria: x      CAPILLARY BLOOD GLUCOSE            RADIOLOGY & ADDITIONAL TESTS:  Reviewed by me

## 2021-01-06 NOTE — ED ADULT NURSE REASSESSMENT NOTE - NS ED NURSE REASSESS COMMENT FT1
report received from [facility] OBE[facility] miller. pt on CCM, NSR. HTN. [facility]U [facility].

## 2021-01-06 NOTE — PHYSICAL THERAPY INITIAL EVALUATION ADULT - PERTINENT HX OF CURRENT PROBLEM, REHAB EVAL
70F w/ hx of prior left cerebellar stroke, HTN, HLD, TIA, right macular degeneration who presented to the ED from home for dizziness, hypertension and leaning to the right when walking since 4:55 am this morning and admitted for dizziness. Imaging negative for acute hemorrhage, is admitted to r/o stroke and hypertensive emergency.

## 2021-01-06 NOTE — CONSULT NOTE ADULT - ASSESSMENT
This is a 69 y/o female w/ hx of prior left cerebellar stroke, HTN, HLD, TIA, right macular degeneration who presented to the ED from home for dizziness, hypertension and leaning to the right when walking since 4:55 am this morning and admitted for dizziness. CTH shows no acute intracranial hemorrhage or transcortical infarct, shows small vessel ischemic disease. 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 Patient admitted to telemetry for further stroke workup.     1)Secondary stroke prevention  - ASA 81mg PO daily and Plavix 75mg PO daily  - Atorvastatin 80mg PO daily    2) Stroke risk factors  - A1C ordered   - LDL ordered  - HTN managed on home medications     3) Further management  - obtain CT head tomorrow morning   - recommend SBP goal <200  - recommend q4hr stroke neuro checks  - will need outpatient neurology f/u     DVT prophylaxis   -Lovenox SQ and SCDs

## 2021-01-06 NOTE — ED PROVIDER NOTE - NS ED ROS FT
CONSTITUTIONAL: No fever, chills, or weakness  NEURO: HPI  EYES: Hx macular degen. No new visual changes  ENT: No rhinorrhea or sore throat  PULM: No cough or dyspnea  CV: No chest pain or palpitations  GI: No abdominal pain, vomiting, or diarrhea  : No dysuria, hematuria, frequency  MSK: No neck pain or back pain, no joint pain  SKIN: no rash or unusual bruising

## 2021-01-06 NOTE — CONSULT NOTE ADULT - ASSESSMENT
70 year old female with,    # Dizziness: symptoms have resolved, no e/o CVA so far. Most likely Care per stroke. Plan to repeat HCT in AM. C/w ASA/plavix/statin. F/up PT/OT recs. DVT PPx with LMWH.    # History of prior CVA: c/w ASA/plavix/statin as above.    # HTN: SBP goal <200. C/w home meds-carvedilol, amlodipine and telmisartan.    # Hyperlipidemia: c/w statin.    # Pre-diabetes: with A1C of 5.8. Monitor BS for now.    # Smoker: quit 4 days, continue to encourage smoking cessation. Due for yearly CT chest for lung cancer screening. Outpatient follow up with Dr. Griffin.    # Anxiety and depression: c/w prozac, ativan as needed.    Thanks for the consult. D/w Stroke, will continue to follow up with you.  D/w patient and son at bedside.     70 year old female with,    # Dizziness: symptoms have resolved, no e/o CVA so far. Care per stroke. Plan to repeat HCT in AM. C/w ASA/plavix/statin. F/up PT/OT recs. DVT PPx with LMWH.    # History of prior CVA: c/w ASA/plavix/statin as above.    # HTN: SBP goal <200. C/w home meds-carvedilol, amlodipine and telmisartan.    # Hyperlipidemia: c/w statin.    # Pre-diabetes: with A1C of 5.8. Monitor BS for now.    # Smoker: quit 4 days, continue to encourage smoking cessation. Due for yearly CT chest for lung cancer screening. Outpatient follow up with Dr. Griffin.    # Anxiety and depression: c/w prozac, ativan as needed.    Thanks for the consult. D/w Stroke, will continue to follow up with you.  D/w patient and son at bedside.     70 year old female with,    # Dizziness: symptoms have resolved, no e/o CVA so far. Care per stroke. Plan to repeat HCT in AM. C/w ASA/plavix/statin. F/up PT/OT recs. DVT PPx with LMWH.    # History of prior CVA: c/w ASA/plavix/statin as above.    # HTN: SBP goal per Stroke <200. C/w home meds-carvedilol, amlodipine and telmisartan.    # Hyperlipidemia: c/w statin.    # Pre-diabetes: with A1C of 5.8. Monitor BS for now.    # Smoker: quit 4 days, continue to encourage smoking cessation. Due for yearly CT chest for lung cancer screening. Outpatient follow up with Dr. Griffin.    # Anxiety and depression: c/w prozac, ativan as needed.    Thanks for the consult. D/w Stroke, will continue to follow up with you.  D/w patient and son at bedside.     70 year old female with,    # Dizziness: symptoms have resolved, no e/o CVA so far. Care per stroke. Plan to repeat HCT in AM. C/w ASA/plavix/statin. F/up PT/OT recs. DVT PPx with LMWH.    # History of prior CVA: c/w ASA/plavix/statin as above.    # HTN: SBP goal per Stroke <200. C/w home meds-carvedilol, amlodipine and telmisartan.    # Hyperlipidemia: c/w statin.    # Pre-diabetes: with A1C of 5.8. Monitor BS for now.    # Smoker: quit 4 days ago, continue to encourage smoking cessation. Due for yearly CT chest for lung cancer screening. Outpatient follow up with Dr. Griffin.    # Anxiety and depression: c/w prozac, ativan as needed.    Thanks for the consult. D/w Stroke, will continue to follow up with you.  D/w patient and son at bedside.

## 2021-01-06 NOTE — ED ADULT NURSE NOTE - PMH
Anxiety    HTN (hypertension)     Anxiety    HLD (hyperlipidemia)    HTN (hypertension)    TIA (transient ischemic attack)

## 2021-01-06 NOTE — ED PROVIDER NOTE - OBJECTIVE STATEMENT
69 yo fem with Hx TIA, HTN, anxiety, carotid stenosis, macular degeneration c/o dizziness.  Pt states she woke up as usual at apx 4:55 am today to start her day, started to feel "woozy" as soon as she stood up at her bureau, felt like she was leaning to right side.  Symptoms lasted several seconds and resolved as soon as she sat down. Has mild posterior headache.  No acute changes in vision, focal weakness or numbness, neck pain, chest pain/palpitations.  She had a similar episode 2 days ago, that lasted about a half hour.  Former smoker who recently started smoking again during Covid-19 crisis. 69 yo fem with Hx TIA, HTN, anxiety, carotid stenosis, macular degeneration c/o dizziness.  Pt states she woke up as usual at apx 4:55 am today to start her day, started to feel "woozy" as soon as she stood up, felt like she was leaning to right side and bumped into her bureau.  Symptoms lasted several seconds and resolved as soon as she sat down. Has mild posterior headache.  No acute changes in vision, focal weakness or numbness, neck pain, chest pain/palpitations.  She had a similar episode 2 days ago, that lasted about a half hour, and she says was more severe.  She called neurologist office (Dr Flynn) yesterday and was told that if it occurs again she should go to ER.  Former smoker who recently started smoking again during Covid-19 crisis. 69 yo fem with Hx TIA, HTN, anxiety, carotid stenosis, macular degeneration right eye c/o dizziness.  Pt states she woke up as usual at apx 4:55 am today to start her day, started to feel "woozy" as soon as she stood up, felt like she was leaning to right side and bumped into her bureau.  Symptoms lasted several seconds and resolved as soon as she sat down. Has mild posterior headache.  No acute changes in vision, focal weakness or numbness, neck pain, chest pain/palpitations.  She had a similar episode 2 days ago, that lasted about a half hour, and she says was more severe.  She called neurologist office (Dr Flynn) yesterday and was told that if it occurs again she should go to ER.  Former smoker who recently started smoking again during Covid-19 crisis.

## 2021-01-06 NOTE — ED ADULT TRIAGE NOTE - OTHER COMPLAINTS
woke up on Monday morning noticed that she was weaker on R side, no facial droop, no slurred speech, no blurred vision, hx of TIA in the past.

## 2021-01-06 NOTE — ED ADULT NURSE NOTE - OBJECTIVE STATEMENT
Pt reports she woke up Monday with R sided weakness, dizziness, "feeling woozy," and head spinning lasting for 45 minutes then resolving. Pt reports this morning she woke up with similar symptoms of feeling off balance, woozy, headache, and "foggy," once she stood up from bed. Pt reports symptoms improve once sitting back down. Pt able to ambulate with steady gait. Pt denies any CP/SOB, no vision changes, no weakness/numbness/tingling reported. Pt denies any N/V/D, no fever/chills, no cough, no known exposure to covid. Pt AOx4. pt has hx of TIA, heart disease, HTN, HLD, and "lung problem." Pt current every day smoker.

## 2021-01-06 NOTE — H&P ADULT - NSICDXPASTMEDICALHX_GEN_ALL_CORE_FT
PAST MEDICAL HISTORY:  Anxiety     HLD (hyperlipidemia)     HTN (hypertension)     TIA (transient ischemic attack)

## 2021-01-06 NOTE — ED PROVIDER NOTE - CLINICAL SUMMARY MEDICAL DECISION MAKING FREE TEXT BOX
Acute dizziness. Not dizzy while sitting during exam, but occurs with position changes.  No nystagmus appreciated, and no abnormality on HINTS exam.  Nonfocal neuro exam.  Likely a peripheral vertigo, but hx of TIA and other stroke risk factors including carotid artery stenosis.  Will get EKG, labs, CT head and CTA head-neck. Acute dizziness. Not dizzy while sitting during exam, but occurs with position changes.  No nystagmus appreciated, and no abnormality on HINTS exam.  Nonfocal neuro exam, and no cerebellar findings.  Likely a peripheral vertigo, but hx of TIA and other stroke risk factors including carotid artery stenosis and prior cerebellar stroke (patient unaware of cerebellar stroke, found on review of EMR).  Will get EKG, labs, CT head and CTA head-neck.  Will have neuro/stroke PA evaluate. Acute dizziness. Not dizzy while sitting during exam, but occurs with position changes.  No nystagmus appreciated, and no abnormality on HINTS exam.  Nonfocal neuro exam, and no abnormal cerebellar findings.  Has hx of TIA and other stroke risk factors including carotid artery stenosis and prior cerebellar stroke (patient unaware of cerebellar stroke, found on review of EMR).  Will get EKG, labs, CT head and CTA head-neck.  Will have neuro/stroke PA evaluate.

## 2021-01-06 NOTE — H&P ADULT - NSHPLABSRESULTS_GEN_ALL_CORE
LABS:                        14.1   7.32  )-----------( 216      ( 2021 06:27 )             42.1     -    140  |  101  |  14  ----------------------------<  111<H>  3.9   |  26  |  0.59    Ca    10.1      2021 06:27    TPro  6.8  /  Alb  4.2  /  TBili  0.8  /  DBili  x   /  AST  15  /  ALT  14  /  AlkPhos  77  01-06    PT/INR - ( 2021 06:27 )   PT: 12.1 sec;   INR: 1.01          PTT - ( 2021 06:27 )  PTT:36.0 sec  Urinalysis Basic - ( 2021 07:16 )    Color: Yellow / Appearance: Clear / S.015 / pH: x  Gluc: x / Ketone: NEGATIVE  / Bili: Negative / Urobili: 0.2 E.U./dL   Blood: x / Protein: NEGATIVE mg/dL / Nitrite: NEGATIVE   Leuk Esterase: NEGATIVE / RBC: x / WBC x   Sq Epi: x / Non Sq Epi: x / Bacteria: x      CAPILLARY BLOOD GLUCOSE          RADIOLOGY & ADDITIONAL TESTS: Reviewed.

## 2021-01-06 NOTE — H&P ADULT - PROBLEM SELECTOR PLAN 1
Patient with hx of cerebellar stroke. CTH and CTA negative. Ddx include repeat stroke vs peripheral causes of dizziness like BPPV less likely orthostatic hypotension or cardiac causes  - ASA 81mg PO daily and Plavix 75mg PO daily  - Atorvastatin 80mg PO daily  - A1C 5.8   - f/u lipid panel  - will repeat CTH tomorrow AM Patient with BP in 190s-220s/80-100s p/w dizziness.   - s/p norvasc and coreg in ED  - will lower MAP by 25% in first hour with goal BP ~160/100 for next 4-6 hours Patient with BP in 190s-220s/80-100s p/w dizziness.   - s/p norvasc and coreg in ED  - will lower MAP by 25% in first hour with goal BP ~160/100 for next 4-6 hours  - c/w home meds starting tomorrow as below

## 2021-01-06 NOTE — PHYSICAL THERAPY INITIAL EVALUATION ADULT - FINE MOTOR COORDINATION, RIGHT HAND THUMB/FINGER OPPOSITION SKILLS, OT EVAL
January 18, 2017     Kenton Mehta MD  [facility] [address] [place] [address]    Patient: Mickey Lind   YOB: 1955   Date of Visit: 1/18/2017       Dear Dr. Tyson MD:    Thank you for referring Mickey Lind to me for evaluation. Below are the relevant portions of my assessment and plan of care.                   If you have questions, please do not hesitate to call me. I look forward to following Mickey along with you.         Sincerely,        Moy Garner MD        CC: No Recipients     normal performance

## 2021-01-06 NOTE — H&P ADULT - HISTORY OF PRESENT ILLNESS
The patient is a 70 year old female with PMH of HLD, TIA, HTN, left cerebellar stroke in 2016, right eye macular degeneration who presented to the ED due to episode of dizziness and feeling "woozy" that occurred upon waking this morning at around 5:00am. She had a similar episode on Monday 1/4, that lasted about a half hour, and she says was more severe than this episode.  She called neurologist office (Dr Flynn) yesterday and was told that if it occurs again she should go to ER.  Stroke was consulted in the ED.  The patient states that she felt as if the room was dizzy and she may have been slightly leaning to the right side today. She states that it felt as if the room was spinning both today and Monday, however, Monday was more severe. Symptoms occurred when she stood up and improved upon sitting down. Worse with movement. States that she has seen Dr. Bowman in the past for her previous stroke and instructed her to stop taking ASA and continue plavix only   Denies neck pain, headache, numbness, slurred speech, acute vision loss.     ED course:   70F with PMH of HLD, TIA, HTN, left cerebellar stroke in 2016, right eye macular degeneration who presented to the ED due to episode of dizziness and feeling "woozy" that occurred upon waking this morning at around 5:00am. She had a similar episode on Monday 1/4, that lasted about a half hour, and she says was more severe than this episode.  She called neurologist office (Dr Flynn) yesterday and was told that if it occurs again she should go to ER.  Stroke was consulted in the ED. The patient states that she felt as if the room was dizzy and she may have been slightly leaning to the right side today. She states that it felt as if the room was spinning both today and Monday, however, Monday was more severe. Symptoms occurred when she stood up and improved upon sitting down. Unchanged with change in position in bed. States that she has seen Dr. Bowman in the past for her previous stroke and instructed her to stop taking ASA and continue plavix only. Denies acute vision changes, neck pain, headache, numbness, slurred speech, acute vision loss, nausea, vomiting, urinary.     ED course:  Vitals: afeb | HR: 60s | BP: 180-220s/70-100s | 16 96% RA  Labs: unremarkable, UA negative   Imaging: CTH and CTA H&N negative  Tx: norvasc 10 x1, Coreg 12.5 x1   70F with PMH of HLD, TIA, HTN, left cerebellar stroke in 2016, right eye macular degeneration who presented to the ED due to episode of dizziness and feeling "woozy" that occurred upon waking this morning at around 5:00am. She had a similar episode on Monday 1/4, that lasted about a half hour, and she says was more severe than this episode.  She called neurologist office (Dr Flynn) yesterday and was told that if it occurs again she should go to ER.  Stroke was consulted in the ED. The patient states that she felt as if the room was dizzy and she may have been slightly leaning to the right side today. She states that it felt as if the room was spinning both today and Monday, however, Monday was more severe. Symptoms occurred when she stood up and improved upon sitting down. Unchanged with change in position in bed. States that she has seen Dr. Bowman in the past for her previous stroke and instructed her to stop taking ASA and continue plavix only. Denies acute vision changes, neck pain, headache, numbness, slurred speech, acute vision loss, nausea, vomiting, urinary. Reports compliance with all her BP medications, states that she did not take them this AM    ED course:  Vitals: afeb | HR: 60s | BP: 180-220s/70-100s | 16 96% RA  Labs: unremarkable, UA negative   Imaging: CTH and CTA H&N negative  Tx: norvasc 10 x1, Coreg 12.5 x1

## 2021-01-06 NOTE — H&P ADULT - NSICDXFAMILYHX_GEN_ALL_CORE_FT
FAMILY HISTORY:  Sibling  Still living? Unknown  Family history of aneurysm, Age at diagnosis: Age Unknown  Family history of cerebrovascular accident (CVA), Age at diagnosis: Age Unknown  Family history of MI (myocardial infarction), Age at diagnosis: Age Unknown

## 2021-01-06 NOTE — PHYSICAL THERAPY INITIAL EVALUATION ADULT - ADDITIONAL COMMENTS
Pt reports living alone in an elevator access apartment. Pt reports being independent with all ADL's and functional mobility without use of an assistive device.

## 2021-01-06 NOTE — ED PROVIDER NOTE - PHYSICAL EXAMINATION
CONSTITUTIONAL: WD,WN. NAD.    SKIN: Normal color and turgor. No rash.    HEAD: NC/AT.  EYES: Conjunctiva clear. EOMI. PERRL.  No nystagmus.   ENT: Airway patent, OP without erythema, tonsillar swelling or exudate; uvula midline without swelling. Nasal mucosa clear, no rhinorrhea.   RESPIRATORY:  Breathing non-labored. No retractions or accessory muscle use.  Lungs CTA bilat.  CARDIOVASCULAR:  RRR, S1S2. No M/R/G. No cervical bruits.     GI:  Abdomen soft, nontender.    MSK: Neck supple with painless ROM.  No lower extremity edema or calf tenderness.  No joint swelling or ROM limitation.  NEURO: Alert and oriented; CN II-XII  intact. Speech clear. 5/5 strength in all extremities.  No arm drift.  SILT throughtout.  Good balance. Steady gait. F-N normal.  No dysdiadochokinesis.  No abnormality appreciated on HINTS exam. CONSTITUTIONAL: WD,WN. NAD.    SKIN: Normal color and turgor. No rash.    HEAD: NC/AT.  EYES: Conjunctiva clear. EOMI. PERRL.  No nystagmus.   ENT: Airway patent, OP without erythema, tonsillar swelling or exudate; uvula midline without swelling. Nasal mucosa clear, no rhinorrhea.   RESPIRATORY:  Breathing non-labored. No retractions or accessory muscle use.  Lungs CTA bilat.  CARDIOVASCULAR:  RRR, S1S2. No M/R/G. No cervical bruits.     GI:  Abdomen soft, nontender.    MSK: Neck supple with painless ROM.  No lower extremity edema or calf tenderness.  No joint swelling or ROM limitation.  NEURO: Alert and oriented; CN II-XII  intact. Speech clear. 5/5 strength in all extremities.  No arm drift.  SILT throughout.  Good balance. Steady gait. F-N normal.  No dysdiadochokinesis.  No abnormality appreciated on HINTS exam.

## 2021-01-06 NOTE — CONSULT NOTE ADULT - SUBJECTIVE AND OBJECTIVE BOX
Neurology Stroke Consult Note    Chief Complaint:   Last known well time: 1/5, exact time unknown    HPI: The patient is a 70 year old female with PMH of HLD, TIA, HTN, left cerebellar stroke in 2016, right eye macular degeneration who presented to the ED due to episode of dizziness and feeling "woozy" that occurred upon waking this morning at around 5:00am. She had a similar episode on Monday 1/4, that lasted about a half hour, and she says was more severe than this episode.  She called neurologist office (Dr Flynn) yesterday and was told that if it occurs again she should go to ER.  Stroke was consulted in the ED.  The patient states that she felt as if the room was dizzy and she may have been slightly leaning to the right side today. She states that it felt as if the room was spinning both today and Monday, however, Monday was more severe. Symptoms occurred when she stood up and improved upon sitting down. Worse with movement. States that she has seen Dr. Bowman in the past for her previous stroke and instructed her to stop taking ASA and continue plavix only   Denies neck pain, headache, numbness, slurred speech, acute vision loss.       PAST MEDICAL & SURGICAL HISTORY:  HLD (hyperlipidemia)    TIA (transient ischemic attack)    Anxiety    HTN (hypertension)    No significant past surgical history    FAMILY HISTORY:  Family history of MI (myocardial infarction) (Sibling)    Family history of cerebrovascular accident (CVA) (Sibling)    Family history of aneurysm (Sibling)    SOCIAL HISTORY:  Denies smoking, drinking, or drug use    ROS:  Constitutional: No fever, weight loss or fatigue  Eyes: No eye pain, visual disturbances, or discharge  ENMT:  No difficulty hearing, tinnitus, vertigo;  No sinus or throat pain  Neck: No pain or stiffness  Respiratory: No cough, wheezing, chills or hemoptysis  Cardiovascular: No chest pain, palpitations, shortness of breath, dizziness or leg swelling  Gastrointestinal: No abdominal pain. No nausea, vomiting or hematemesis; No diarrhea or constipation. Nohematochezia.  Genitourinary: No dysuria, frequency, hematuria or incontinence  Neurological: As per HPI  Skin: No itching, burning, rashes or lesions   Endocrine: No heat or cold intolerance; No hair loss  Musculoskeletal: No joint pain or swelling; No muscle, back or extremity pain  Psychiatric: No depression, anxiety, mood swings or difficulty sleeping  Heme/Lymph: No easy bruising or bleeding gums    MEDICATIONS  (STANDING):    MEDICATIONS  (PRN):      Allergies    penicillin (Other)    Intolerances    Vital Signs Last 24 Hrs  T(C): 36.6 (06 Jan 2021 05:50), Max: 36.6 (06 Jan 2021 05:50)  T(F): 97.9 (06 Jan 2021 05:50), Max: 97.9 (06 Jan 2021 05:50)  HR: 65 (06 Jan 2021 06:57) (65 - 74)  BP: 188/90 (06 Jan 2021 06:57) (188/90 - 198/75)  BP(mean): --  RR: 18 (06 Jan 2021 06:57) (18 - 18)  SpO2: 99% (06 Jan 2021 06:57) (98% - 99%)    Physical exam:  General: No acute distress, awake and alert    Neurologic:  -Mental status: Awake, alert, oriented to person, place, and time. Speech is fluent with intact naming, repetition, and comprehension, no dysarthria. Recent and remote memory intact. Follows commands. Attention/concentration intact. Fund of knowledge appropriate.  -Cranial nerves:   II: Visual fields are full to confrontation.  III, IV, VI: Extraocular movements are intact without nystagmus. Pupils equally round and reactive to light  V:  Facial sensation V1-V3 equal and intact   VII: Face is symmetric with normal eye closure and smile  Motor: Normal bulk and tone. No pronator drift bilaterally. Right UE 5/5. Left UE 4+/5. RLE 5/5, LLE 4+/5.   Sensation: Intact to light touch bilaterally. No neglect or extinction on double simultaneous testing.  Coordination: No dysmetria on finger-to-nose and heel-to-shin bilaterally  Gait: Narrow gait and steady, somewhat leaning to the right while walking however subtle    NIHSS: 0    LABS:                        14.1   7.32  )-----------( 216      ( 06 Jan 2021 06:27 )             42.1     01-06    140  |  101  |  14  ----------------------------<  111<H>  3.9   |  26  |  0.59    Ca    10.1      06 Jan 2021 06:27    TPro  6.8  /  Alb  4.2  /  TBili  0.8  /  DBili  x   /  AST  15  /  ALT  14  /  AlkPhos  77  01-06    PT/INR - ( 06 Jan 2021 06:27 )   PT: 12.1 sec;   INR: 1.01          PTT - ( 06 Jan 2021 06:27 )  PTT:36.0 sec    RADIOLOGY & ADDITIONAL TESTS:      Assessment and Plan  70y Female   Neurology Stroke Consult Note    Chief Complaint:   Last known well time: 1/5, exact time unknown    HPI: The patient is a 70 year old female with PMH of HLD, TIA, HTN, left cerebellar stroke in 2016, right eye macular degeneration who presented to the ED due to episode of dizziness and feeling "woozy" that occurred upon waking this morning at around 5:00am. She had a similar episode on Monday 1/4, that lasted about a half hour, and she says was more severe than this episode.  She called neurologist office (Dr Flynn) yesterday and was told that if it occurs again she should go to ER.  Stroke was consulted in the ED.  The patient states that she felt as if the room was dizzy and she may have been slightly leaning to the right side today. She states that it felt as if the room was spinning both today and Monday, however, Monday was more severe. Symptoms occurred when she stood up and improved upon sitting down. Worse with movement. States that she has seen Dr. Bowman in the past for her previous stroke and instructed her to stop taking ASA and continue plavix only   Denies neck pain, headache, numbness, slurred speech, acute vision loss.       PAST MEDICAL & SURGICAL HISTORY:  HLD (hyperlipidemia)    TIA (transient ischemic attack)    Anxiety    HTN (hypertension)    No significant past surgical history    FAMILY HISTORY:  Family history of MI (myocardial infarction) (Sibling)    Family history of cerebrovascular accident (CVA) (Sibling)    Family history of aneurysm (Sibling)    SOCIAL HISTORY:  Denies smoking, drinking, or drug use    ROS:  Constitutional: No fever, weight loss or fatigue  Eyes: No eye pain, visual disturbances, or discharge  ENMT:  No difficulty hearing, tinnitus, vertigo;  No sinus or throat pain  Neck: No pain or stiffness  Respiratory: No cough, wheezing, chills or hemoptysis  Cardiovascular: No chest pain, palpitations, shortness of breath, dizziness or leg swelling  Gastrointestinal: No abdominal pain. No nausea, vomiting or hematemesis; No diarrhea or constipation. Nohematochezia.  Genitourinary: No dysuria, frequency, hematuria or incontinence  Neurological: As per HPI  Skin: No itching, burning, rashes or lesions   Endocrine: No heat or cold intolerance; No hair loss  Musculoskeletal: No joint pain or swelling; No muscle, back or extremity pain  Psychiatric: No depression, anxiety, mood swings or difficulty sleeping  Heme/Lymph: No easy bruising or bleeding gums    MEDICATIONS  (STANDING):    MEDICATIONS  (PRN):      Allergies    penicillin (Other)    Intolerances    Vital Signs Last 24 Hrs  T(C): 36.6 (06 Jan 2021 05:50), Max: 36.6 (06 Jan 2021 05:50)  T(F): 97.9 (06 Jan 2021 05:50), Max: 97.9 (06 Jan 2021 05:50)  HR: 65 (06 Jan 2021 06:57) (65 - 74)  BP: 188/90 (06 Jan 2021 06:57) (188/90 - 198/75)  BP(mean): --  RR: 18 (06 Jan 2021 06:57) (18 - 18)  SpO2: 99% (06 Jan 2021 06:57) (98% - 99%)    Physical exam:  General: No acute distress, awake and alert    Neurologic:  -Mental status: Awake, alert, oriented to person, place, and time. Speech is fluent with intact naming, repetition, and comprehension, no dysarthria. Recent and remote memory intact. Follows commands. Attention/concentration intact. Fund of knowledge appropriate.  -Cranial nerves:   II: Visual fields are full to confrontation.  III, IV, VI: Extraocular movements are intact without nystagmus. Pupils equally round and reactive to light  V:  Facial sensation V1-V3 equal and intact   VII: Face is symmetric with normal eye closure and smile  Motor: Normal bulk and tone. No pronator drift bilaterally. Right UE 5/5. Left UE 4+/5. RLE 5/5, LLE 4+/5.   Sensation: Intact to light touch bilaterally. No neglect or extinction on double simultaneous testing.  Coordination: No dysmetria on finger-to-nose and heel-to-shin bilaterally  Gait: Narrow gait and steady, somewhat leaning to the right while walking however subtle    NIHSS: 0    LABS:                        14.1   7.32  )-----------( 216      ( 06 Jan 2021 06:27 )             42.1     01-06    140  |  101  |  14  ----------------------------<  111<H>  3.9   |  26  |  0.59    Ca    10.1      06 Jan 2021 06:27    TPro  6.8  /  Alb  4.2  /  TBili  0.8  /  DBili  x   /  AST  15  /  ALT  14  /  AlkPhos  77  01-06    PT/INR - ( 06 Jan 2021 06:27 )   PT: 12.1 sec;   INR: 1.01          PTT - ( 06 Jan 2021 06:27 )  PTT:36.0 sec    RADIOLOGY & ADDITIONAL TESTS:  < from: CT Head No Cont (01.06.21 @ 07:42) >    IMPRESSION: No acute intracranial hemorrhage or transcortical infarct. Small vessel ischemic disease.    < end of copied text >    < from: CT Angio Head w/ IV Cont (01.06.21 @ 07:43) >  IMPRESSION: Mild stenosis of the bilateral proximal internal carotid arteries per NASCET criteria. Multifocal mild to moderate stenosis of the cervical left vertebral artery with lack of contrast filling of the intracranial left vertebral artery after the origin of PICA which has an extradural origin.    Groundglass opacities of the bilateral upper lobes which have progressed since prior CT chest 2/4/2020 and may be secondary to intrinsic parenchymal disease although atypical pneumonia including COVID-19 cannot be excluded.    < end of copied text >

## 2021-01-06 NOTE — ED PROVIDER NOTE - PROGRESS NOTE DETAILS
Stroke PA notified and will evaluate. Received SO from [facility] and Dr. Proctor. Pending CT results and Stroke Team. As per stroke team, plan for admission.

## 2021-01-06 NOTE — H&P ADULT - PROBLEM SELECTOR PLAN 2
pt with hx of L cerebellar stroke  - mgmt as above Patient with hx of cerebellar stroke. CTH and CTA negative. Ddz repeat stroke vs peripheral causes of dizziness like BPPV less likely orthostatic hypotension or cardiac causes  - ASA 81mg PO daily and Plavix 75mg PO daily  - Atorvastatin 80mg PO daily  - A1C 5.8   - f/u lipid panel  - will repeat CTH tomorrow AM

## 2021-01-06 NOTE — H&P ADULT - ASSESSMENT
70F w/ hx of prior left cerebellar stroke, HTN, HLD, TIA, right macular degeneration who presented to the ED from home for dizziness, hypertension and leaning to the right when walking since 4:55 am this morning and admitted for dizziness. CTH shows no acute intracranial hemorrhage or transcortical infarct, shows small vessel ischemic disease. 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 Patient admitted to telemetry for further stroke workup.  70F w/ hx of prior left cerebellar stroke, HTN, HLD, TIA, right macular degeneration who presented to the ED from home for dizziness, hypertension and leaning to the right when walking since 4:55 am this morning and admitted for dizziness. Imaging negative for acute hemorrhage, is admitted to r/o stroke and hypertensive emergency.

## 2021-01-07 ENCOUNTER — TRANSCRIPTION ENCOUNTER (OUTPATIENT)
Age: 71
End: 2021-01-07

## 2021-01-07 ENCOUNTER — APPOINTMENT (OUTPATIENT)
Dept: NEUROLOGY | Facility: CLINIC | Age: 71
End: 2021-01-07

## 2021-01-07 VITALS
RESPIRATION RATE: 16 BRPM | OXYGEN SATURATION: 96 % | DIASTOLIC BLOOD PRESSURE: 75 MMHG | SYSTOLIC BLOOD PRESSURE: 160 MMHG | HEART RATE: 62 BPM

## 2021-01-07 PROBLEM — G45.9 TRANSIENT CEREBRAL ISCHEMIC ATTACK, UNSPECIFIED: Chronic | Status: ACTIVE | Noted: 2021-01-06

## 2021-01-07 PROBLEM — E78.5 HYPERLIPIDEMIA, UNSPECIFIED: Chronic | Status: ACTIVE | Noted: 2021-01-06

## 2021-01-07 LAB
ANION GAP SERPL CALC-SCNC: 11 MMOL/L — SIGNIFICANT CHANGE UP (ref 5–17)
BUN SERPL-MCNC: 17 MG/DL — SIGNIFICANT CHANGE UP (ref 7–23)
CALCIUM SERPL-MCNC: 9.8 MG/DL — SIGNIFICANT CHANGE UP (ref 8.4–10.5)
CHLORIDE SERPL-SCNC: 101 MMOL/L — SIGNIFICANT CHANGE UP (ref 96–108)
CHOLEST SERPL-MCNC: 130 MG/DL — SIGNIFICANT CHANGE UP
CO2 SERPL-SCNC: 29 MMOL/L — SIGNIFICANT CHANGE UP (ref 22–31)
CREAT SERPL-MCNC: 0.59 MG/DL — SIGNIFICANT CHANGE UP (ref 0.5–1.3)
GLUCOSE SERPL-MCNC: 127 MG/DL — HIGH (ref 70–99)
HCT VFR BLD CALC: 41.3 % — SIGNIFICANT CHANGE UP (ref 34.5–45)
HDLC SERPL-MCNC: 44 MG/DL — LOW
HGB BLD-MCNC: 13.4 G/DL — SIGNIFICANT CHANGE UP (ref 11.5–15.5)
LIPID PNL WITH DIRECT LDL SERPL: 64 MG/DL — SIGNIFICANT CHANGE UP
MAGNESIUM SERPL-MCNC: 2.2 MG/DL — SIGNIFICANT CHANGE UP (ref 1.6–2.6)
MCHC RBC-ENTMCNC: 28.4 PG — SIGNIFICANT CHANGE UP (ref 27–34)
MCHC RBC-ENTMCNC: 32.4 GM/DL — SIGNIFICANT CHANGE UP (ref 32–36)
MCV RBC AUTO: 87.5 FL — SIGNIFICANT CHANGE UP (ref 80–100)
NON HDL CHOLESTEROL: 86 MG/DL — SIGNIFICANT CHANGE UP
NRBC # BLD: 0 /100 WBCS — SIGNIFICANT CHANGE UP (ref 0–0)
PHOSPHATE SERPL-MCNC: 3.5 MG/DL — SIGNIFICANT CHANGE UP (ref 2.5–4.5)
PLATELET # BLD AUTO: 224 K/UL — SIGNIFICANT CHANGE UP (ref 150–400)
POTASSIUM SERPL-MCNC: 4.5 MMOL/L — SIGNIFICANT CHANGE UP (ref 3.5–5.3)
POTASSIUM SERPL-SCNC: 4.5 MMOL/L — SIGNIFICANT CHANGE UP (ref 3.5–5.3)
RBC # BLD: 4.72 M/UL — SIGNIFICANT CHANGE UP (ref 3.8–5.2)
RBC # FLD: 13.7 % — SIGNIFICANT CHANGE UP (ref 10.3–14.5)
SODIUM SERPL-SCNC: 141 MMOL/L — SIGNIFICANT CHANGE UP (ref 135–145)
TRIGL SERPL-MCNC: 109 MG/DL — SIGNIFICANT CHANGE UP
WBC # BLD: 5.59 K/UL — SIGNIFICANT CHANGE UP (ref 3.8–10.5)
WBC # FLD AUTO: 5.59 K/UL — SIGNIFICANT CHANGE UP (ref 3.8–10.5)

## 2021-01-07 PROCEDURE — 99233 SBSQ HOSP IP/OBS HIGH 50: CPT

## 2021-01-07 PROCEDURE — 70450 CT HEAD/BRAIN W/O DYE: CPT | Mod: 26

## 2021-01-07 RX ADMIN — CLOPIDOGREL BISULFATE 75 MILLIGRAM(S): 75 TABLET, FILM COATED ORAL at 11:39

## 2021-01-07 RX ADMIN — AMLODIPINE BESYLATE 10 MILLIGRAM(S): 2.5 TABLET ORAL at 00:30

## 2021-01-07 RX ADMIN — PANTOPRAZOLE SODIUM 40 MILLIGRAM(S): 20 TABLET, DELAYED RELEASE ORAL at 05:19

## 2021-01-07 RX ADMIN — CARVEDILOL PHOSPHATE 12.5 MILLIGRAM(S): 80 CAPSULE, EXTENDED RELEASE ORAL at 05:19

## 2021-01-07 RX ADMIN — ENOXAPARIN SODIUM 40 MILLIGRAM(S): 100 INJECTION SUBCUTANEOUS at 11:39

## 2021-01-07 RX ADMIN — LOSARTAN POTASSIUM 100 MILLIGRAM(S): 100 TABLET, FILM COATED ORAL at 05:19

## 2021-01-07 RX ADMIN — Medication 81 MILLIGRAM(S): at 11:39

## 2021-01-07 NOTE — DISCHARGE NOTE PROVIDER - PROVIDER TOKENS
PROVIDER:[TOKEN:[[id number]]] PROVIDER:[TOKEN:[[id number]]],PROVIDER:[TOKEN:[[id number]],ESTABLISHEDPATIENT:[T]]

## 2021-01-07 NOTE — CONSULT NOTE ADULT - SUBJECTIVE AND OBJECTIVE BOX
Patient is a 70y old  Female who presents with a chief complaint of      HPI:  70F with PMH of HLD, TIA, HTN, left cerebellar stroke in 2016, right eye macular degeneration who presented to the ED due to episode of dizziness and feeling "woozy" that occurred upon waking this morning at around 5:00am. She had a similar episode on , that lasted about a half hour, and she says was more severe than this episode.  She called neurologist office (Dr Flynn) yesterday and was told that if it occurs again she should go to ER.  Stroke was consulted in the ED. The patient states that she felt as if the room was dizzy and she may have been slightly leaning to the right side today. She states that it felt as if the room was spinning both today and Monday, however, Monday was more severe. Symptoms occurred when she stood up and improved upon sitting down. Unchanged with change in position in bed. States that she has seen Dr. Bowman in the past for her previous stroke and instructed her to stop taking ASA and continue plavix only. Denies acute vision changes, neck pain, headache, numbness, slurred speech, acute vision loss, nausea, vomiting, urinary. Reports compliance with all her BP medications, states that she did not take them this AM    ED course:  Vitals: afeb | HR: 60s | BP: 180-220s/70-100s | 16 96% RA  Labs: unremarkable, UA negative   Imaging: CTH and CTA H&N negative  Tx: norvasc 10 x1, Coreg 12.5 x1   (2021 12:35)      PAST MEDICAL & SURGICAL HISTORY:  HLD (hyperlipidemia)    TIA (transient ischemic attack)    Anxiety    HTN (hypertension)    No significant past surgical history        MEDICATIONS  (STANDING):  amLODIPine   Tablet 10 milliGRAM(s) Oral every 24 hours  aspirin  chewable 81 milliGRAM(s) Oral daily  atorvastatin 80 milliGRAM(s) Oral at bedtime  carvedilol 12.5 milliGRAM(s) Oral every 12 hours  clopidogrel Tablet 75 milliGRAM(s) Oral daily  enoxaparin Injectable 40 milliGRAM(s) SubCutaneous every 24 hours  FLUoxetine 60 milliGRAM(s) Oral at bedtime  losartan 100 milliGRAM(s) Oral daily  pantoprazole    Tablet 40 milliGRAM(s) Oral before breakfast    MEDICATIONS  (PRN):  acetaminophen   Tablet .. 650 milliGRAM(s) Oral every 6 hours PRN Moderate Pain (4 - 6)        Social History:             -  Home Living Status:  lives alone in an elevator accessible apartment building           -  Prior Home Care Services:   none    Baseline Functional Level Prior to Admission:             - ADL's/ IADL's:    patient states she is fully indendent           - ambulatory status PTA:  walked without devices    FAMILY HISTORY:  Family history of MI (myocardial infarction) (Sibling)    Family history of cerebrovascular accident (CVA) (Sibling)    Family history of aneurysm (Sibling)        CBC Full  -  ( 2021 07:25 )  WBC Count : 5.59 K/uL  RBC Count : 4.72 M/uL  Hemoglobin : 13.4 g/dL  Hematocrit : 41.3 %  Platelet Count - Automated : 224 K/uL  Mean Cell Volume : 87.5 fl  Mean Cell Hemoglobin : 28.4 pg  Mean Cell Hemoglobin Concentration : 32.4 gm/dL  Auto Neutrophil # : x  Auto Lymphocyte # : x  Auto Monocyte # : x  Auto Eosinophil # : x  Auto Basophil # : x  Auto Neutrophil % : x  Auto Lymphocyte % : x  Auto Monocyte % : x  Auto Eosinophil % : x  Auto Basophil % : x          141  |  101  |  17  ----------------------------<  127<H>  4.5   |  29  |  0.59    Ca    9.8      2021 07:25  Phos  3.5       Mg     2.2         TPro  6.8  /  Alb  4.2  /  TBili  0.8  /  DBili  x   /  AST  15  /  ALT  14  /  AlkPhos  77        Urinalysis Basic - ( 2021 07:16 )    Color: Yellow / Appearance: Clear / S.015 / pH: x  Gluc: x / Ketone: NEGATIVE  / Bili: Negative / Urobili: 0.2 E.U./dL   Blood: x / Protein: NEGATIVE mg/dL / Nitrite: NEGATIVE   Leuk Esterase: NEGATIVE / RBC: x / WBC x   Sq Epi: x / Non Sq Epi: x / Bacteria: x          Radiology:    < from: CT Head No Cont (21 @ 07:42) >  EXAM:  CT BRAIN                          PROCEDURE DATE:  2021          INTERPRETATION:  Pavel LOPEZ MD, have reviewed the images and the report and agree with the findings.    INDICATIONS: Dizziness, nonspecific    TECHNIQUE: Serialaxial images were obtained from the skull base to the vertex without the use of intravenous contrast. Multiplanar reformats were obtained.    COMPARISON EXAMINATION: CT of the head from 2016, MRI head from 9/15/2016 and CT of the head from 2016.    FINDINGS:  VENTRICLES AND SULCI: Age-related parenchymal parenchymal volume loss with dilated ventricles and prominent cortical sulci.  INTRA-AXIAL: No intracranial mass, acute hemorrhage, midline shift or acute transcortical infarct is seen.. Patchy periventricular hypodense foci consistent with small vessel ischemic disease. Prominent perivascular spaces in the basal ganglia bilaterally, similar to prior studies. Left cerebellar infarct seen on prior MRI is not identified in this study.  EXTRA-AXIAL: No extra-axial fluid collection is present.  VISUALIZED SINUSES: No air-fluid levels are identified.  VISUALIZED MASTOIDS: Clear.  CALVARIUM: Hyperostosis frontalis.    IMPRESSION: No acute intracranial hemorrhage or transcortical infarct. Small vessel ischemic disease.        < from: CT Angio Head w/ IV Cont (21 @ 07:43) >  EXAM:  CT ANGIO BRAIN (W)AW IC                          EXAM:  CT ANGIO NECK (W)AW IC                          PROCEDURE DATE:  2021          INTERPRETATION:  PROCEDURE: CTA brain with intravenous contrast.    INDICATION: Dizziness.    TECHNIQUE: Multiple axial thin section were obtained through the [place] [facility] [place] following the intravenous bolus injection of 90 cc of Optiray-350. MIP series are provided.    COMPARISON: No similar prior studies available for comparison.    FINDINGS: There is calcified and noncalcified plaque of the bilateral cavernous and supraclinoid segments resulting in moderate to severe stenosis of the supraclinoid right ICA and mild stenosis of the supraclinoid left ICA.  The anterior and middle cerebral arteries are patent at their 1st and 2nd order segments. There is a hypoplastic right A1 segment. The posterior circulation shows no high grade stenosis or occlusion. There is lack of contrast filling of the intracranial left vertebral artery after the origin of the left PICA which has an extradural origin which is either due to high-grade stenosis or occlusion. There is mild atherosclerotic plaque of the right V4 segment without high-grade stenosis. There is contrast filling of the basilar artery and bilateral posterior cerebral arteries without high-grade stenosis. As a hypoplastic right P1 segment with a prominent right posterior communicating artery (fetal origin).    IMPRESSION:  Lack of contrast filling of the intracranial left vertebral artery after the origin of the left PICA which has an extradural origin which is either due to high-grade stenosis or occlusion.    Moderate to severe stenosis of the supraclinoid right ICA.    Findings were discussed with [facility] by Dr. Pavel Porter on 2021 9:23 AM    PROCEDURE: CTA neck with intravenous contrast.    INDICATION: Dizziness.    TECHNIQUE: Multiple axial thin section were obtained through the neck following the intravenous bolus injection of Optiray-350 as above. MIP series are provided.    COMPARISON: CT chest 2020    FINDINGS:    The aortic arch is normal size and shows patency, with normal 3 vessel configuration.    Both common carotid arteries are patent up to the bifurcations.    There is predominantly calcifiedplaque of the proximal right internal carotid artery with mild (approximately 20%) stenosis per NASCET criteria.    There is calcified and noncalcified plaque of the left carotid bifurcation and proximal left internal carotid artery resulting in mild(approximately 30%) stenosis per NASCET criteria.    The dominant right cervical vertebral artery is patent throughout, without hemodynamically significant stenosis or occlusion. The left vertebral artery is diminutive compared to the right however there is multifocal areas of mild to moderate stenosis throughout the cervical left vertebral artery. There are multilevel degenerative changes of the cervical spine.    There are ground glass opacities of the bilateral upper lobes which have progressed since prior CT chest 2020    IMPRESSION: Mild stenosis of the bilateral proximal internal carotid arteries per NASCET criteria. Multifocal mild to moderate stenosis of the cervical left vertebral artery with lack of contrast filling of the intracranial left vertebral artery after the origin of PICA which has an extradural origin.    Groundglass opacities of the bilateral upper lobes which have progressed since prior CT chest 2020 and may be secondary to intrinsic parenchymal disease although atypical pneumonia including COVID-19 cannot be excluded.            Vital Signs Last 24 Hrs  T(C): 36.7 (2021 04:43), Max: 36.8 (2021 12:22)  T(F): 98 (2021 04:43), Max: 98.2 (2021 12:22)  HR: 58 (2021 08:40) (58 - 74)  BP: 160/71 (2021 08:40) (121/62 - 160/71)  BP(mean): 102 (2021 08:40) (84 - 102)  RR: 18 (2021 08:40) (16 - 18)  SpO2: 95% (2021 08:40) (92% - 98%)    REVIEW OF SYSTEMS:    CONSTITUTIONAL: No fever, weight loss, or fatigue  EYES: No eye pain, visual disturbances, or discharge  ENMT:  No difficulty hearing, tinnitus, vertigo; No sinus or throat pain  NECK: No pain or stiffness  BREASTS: No pain, masses, or nipple discharge  RESPIRATORY: No cough, wheezing, chills or hemoptysis; No shortness of breath  CARDIOVASCULAR: No chest pain, palpitations, dizziness, or leg swelling  GASTROINTESTINAL: No abdominal or epigastric pain. No nausea, vomiting, or hematemesis; No diarrhea or constipation. No melena or hematochezia.  GENITOURINARY: No dysuria, frequency, hematuria, or incontinence  NEUROLOGICAL: dizziness  SKIN: No itching, burning, rashes, or lesions   LYMPH NODES: No enlarged glands  ENDOCRINE: No heat or cold intolerance; No hair loss  MUSCULOSKELETAL: No joint pain or swelling; No muscle, back, or extremity pain  PSYCHIATRIC: No depression, anxiety, mood swings, or difficulty sleeping  HEME/LYMPH: No easy bruising, or bleeding gums  ALLERGY AND IMMUNOLOGIC: No hives or eczema  VASCULAR: no swelling, erythema,   : no dysuria, hematuria, frequency        Physical Exam:  71 yo  woman lying in bed, no acute complaints of dizziness    Head: normocephalic, atraumatic    Eyes: PERRLA, EOMI, no nystagmus, sclera anicteric    ENT: nasal discharge, uvula midline, no oropharyngeal erythema/exudate    Neck: supple, negative JVD, negative carotid bruits, no thyromegaly    Chest: CTA bilaterally, neg wheeze/ rhonchi/ rales/ crackles/ egophany    Cardiovascular: regular rate and rhythm, neg murmurs/rubs/gallops    Abdomen: soft, non distended, non tender to palpation in all 4 quadrants, negative rebound/guarding, normal bowel sounds    Extremities: WWP, neg cyanosis/clubbing/edema, negative calf tenderness to palpation, negative Nicole's sign    Musculoskeletal:    Skin:      :     Neurologic Exam:    Alert and oriented to person, place, date/year, speech fluent w/o dysarthria, follows commands, recent and remote memory intact, repetition intact, comprehension intact,  attention/concentration intact, fund of knowledge appropriate    Cranial Nerves:     II:                       pupils equal, round and reactive to light, visual fields intact   III/ IV/VI:            extraocular movements intact, neg nystagmus, neg ptosis  V:                       facial sensation intact, V1-3 normal  VII:                     face symmetric, no droop, normal eye closure and smile  VIII:                    hearing intact to finger rub bilaterally  IX/ X:                 soft palate rise symmetrical  XI:                      head turning, shoulder shrug normal  XII:                     tongue midline    Motor Exam:    Right UE:   no focal weakness > 4/5                     pronator drift: neg    Left UE:       no focal weakness > 4/5                    pronator drift: neg        Right LE:   no focal weakness > 4/5    Left LE:     no focal weakness > 4/5               Sensation: Intact to light touch x 4 extremities                                         DTR:                        biceps/brachioradialis: equal bilaterally          R:       L:             patella/ankle: equal bilaterally          R:      L:             neg clonus          neg Babinski                        Finger to Nose:  neg dysmetria bilaterally          R:            L:     Heel to shin: wnl bilaterally          R:            L:     Rapid Alternating movements:  neg dysdiadochokinesia bilaterally          R:            L:     Joint Position Sense: wnl bilaterally          R:           L:     Romberg:  not tested    Tandem Walking:  not tested    Gait:  not tested        PM&R Impression:    1) dizziness  2) no focal weakness    Plan:    1) Physical therapy focusing on therapeutic exercises, bed mobility/transfer out of bed evaluation, progressive ambulation with assistive devices prn.    2) Anticipated Disposition Plan/Recs:   d/c home with no post discharge rehab needs

## 2021-01-07 NOTE — PROGRESS NOTE ADULT - PROBLEM SELECTOR PLAN 2
Patient with hx of cerebellar stroke. CTH and CTA negative. Ddz repeat stroke vs peripheral causes of dizziness like BPPV less likely orthostatic hypotension or cardiac causes  - ASA 81mg PO daily and Plavix 75mg PO daily  - Atorvastatin 80mg PO daily  - A1C 5.8   - f/u lipid panel  - will repeat CTH tomorrow AM Patient with hx of cerebellar stroke. CTH and CTA negative. Ddz repeat stroke vs peripheral causes of dizziness like BPPV less likely orthostatic hypotension or cardiac causes  - ASA 81mg PO daily and Plavix 75mg PO daily  - Atorvastatin 80mg PO daily  - A1C 5.8   - lipid panel with mildly low HDL  - f/u repeat CTH

## 2021-01-07 NOTE — PROGRESS NOTE ADULT - SUBJECTIVE AND OBJECTIVE BOX
INTERVAL EVENTS:  -- Repeat HCT stable.     SUBJECTIVE:  -- denies any ongoing lightheadedness or dizziness; has ambulated and asymptomatic.    -- no HA, vison changes, CP or abdominal pain.   -- Review of Systems: 12 point review of systems otherwise negative    MEDICATIONS:  MEDICATIONS  (STANDING):  amLODIPine   Tablet 10 milliGRAM(s) Oral every 24 hours  aspirin  chewable 81 milliGRAM(s) Oral daily  atorvastatin 80 milliGRAM(s) Oral at bedtime  carvedilol 12.5 milliGRAM(s) Oral every 12 hours  clopidogrel Tablet 75 milliGRAM(s) Oral daily  enoxaparin Injectable 40 milliGRAM(s) SubCutaneous every 24 hours  FLUoxetine 60 milliGRAM(s) Oral at bedtime  losartan 100 milliGRAM(s) Oral daily  pantoprazole    Tablet 40 milliGRAM(s) Oral before breakfast    MEDICATIONS  (PRN):  acetaminophen   Tablet .. 650 milliGRAM(s) Oral every 6 hours PRN Moderate Pain (4 - 6)    Allergies: penicillin (Other)    OBJECTIVE:  Vital Signs Last 24 Hrs  T(C): 36.4 (07 Jan 2021 09:24), Max: 36.8 (06 Jan 2021 12:22)  T(F): 97.5 (07 Jan 2021 09:24), Max: 98.2 (06 Jan 2021 12:22)  HR: 58 (07 Jan 2021 08:40) (58 - 74)  BP: 160/71 (07 Jan 2021 08:40) (121/62 - 160/71)  BP(mean): 102 (07 Jan 2021 08:40) (84 - 102)  RR: 18 (07 Jan 2021 08:40) (16 - 18)  SpO2: 95% (07 Jan 2021 08:40) (92% - 98%)  I&O's Summary    PHYSICAL EXAM:  Gen: NAD, sitting upright in bed  HEENT: NCAT, MMM, clear OP  Neck: supple, trachea at midline  CV: RRR, no m/g/r appreciated  Pulm: CTA B, no w/r/r; no increase in WOB  Abd: normoactive BS, soft, NTND  Ext: WWP, 2+ pulses x4; no c/c/e  Neuro: AOx3, CN II-XII grossly intact; nonfocal  Psych: pleasant, conversant and appropriate    LABS:                        13.4   5.59  )-----------( 224      ( 07 Jan 2021 07:25 )             41.3     01-07    141  |  101  |  17  ----------------------------<  127<H>  4.5   |  29  |  0.59    Ca    9.8      07 Jan 2021 07:25  Phos  3.5     01-07  Mg     2.2     01-07    MICRODATA:  -- Culture - Urine (collected 06 Jan 2021 09:15)  Source: .Urine Clean Catch (Midstream)  Preliminary Report (07 Jan 2021 11:18):    15,000 CFU/ml Streptococcus agalactiae (Group B)    Susceptibility to follow.    RADIOLOGY/OTHER STUDIES:  -- Non-Contrast Head CT (1/7, per Radiology)   No acute intracranial hemorrhage, mass effect or acute demarcated territorial infarction. Stable examination.

## 2021-01-07 NOTE — DISCHARGE NOTE NURSING/CASE MANAGEMENT/SOCIAL WORK - PATIENT PORTAL LINK FT
You can access the FollowMyHealth Patient Portal offered by [facility] by registering at the following website: [email or web address]. By joining [employer]’s FollowMyHealth portal, you will also be able to view your health information using other applications (apps) compatible with our system.

## 2021-01-07 NOTE — CONSULT NOTE ADULT - ASSESSMENT
per Neurology    71 y/o female w/ hx of prior left cerebellar stroke, HTN, HLD, TIA, right macular degeneration who presented to the ED from home for dizziness, hypertension and leaning to the right when walking since 4:55 am this morning and admitted for dizziness. CTH shows no acute intracranial hemorrhage or transcortical infarct, shows small vessel ischemic disease. 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 Patient admitted to telemetry for further stroke workup.     1)Secondary stroke prevention  - ASA 81mg PO daily and Plavix 75mg PO daily  - Atorvastatin 80mg PO daily    2) Stroke risk factors  - A1C ordered   - LDL ordered  - HTN managed on home medications     3) Further management  - obtain CT head tomorrow morning   - recommend SBP goal <200  - recommend q4hr stroke neuro checks  - will need outpatient neurology f/u     DVT prophylaxis   -Lovenox SQ and SCDs

## 2021-01-07 NOTE — DISCHARGE NOTE PROVIDER - CARE PROVIDER_API CALL
Vidal Flynn  NEUROLOGY  [address] [place] [address]  Phone: [phone]  Fax: [phone]  Follow Up Time:    Vidal Flynn  NEUROLOGY  [address] [address]  Phone: [phone]  Fax: [phone]  Follow Up Time:     Teodora Reyes  CARDIOLOGY  [address] [address]  Phone: [phone]  Fax: [phone]  Established Patient  Follow Up Time:

## 2021-01-07 NOTE — DISCHARGE NOTE PROVIDER - NSDCMRMEDTOKEN_GEN_ALL_CORE_FT
amLODIPine 10 mg oral tablet: 1 tab(s) orally once a day  aspirin 81 mg oral delayed release tablet: 1 tab(s) orally once a day  Ativan 1 mg oral tablet:  orally , As Needed  atorvastatin 80 mg oral tablet: 1 tab(s) orally once a day  carvedilol 12.5 mg oral tablet: 1 tab(s) orally 2 times a day  clopidogrel 75 mg oral tablet: 1 tab(s) orally once a day  Protonix 40 mg oral delayed release tablet: 1 tab(s) orally once a day  PROzac 20 mg oral capsule: 1 cap(s) orally once a day  telmisartan 80 mg oral tablet: 1 tab(s) orally once a day   amLODIPine 10 mg oral tablet: 1 tab(s) orally once a day  aspirin 81 mg oral delayed release tablet: 1 tab(s) orally once a day  Ativan 1 mg oral tablet:  orally , As Needed  atorvastatin 80 mg oral tablet: 1 tab(s) orally once a day  carvedilol 12.5 mg oral tablet: 1 tab(s) orally 2 times a day  Protonix 40 mg oral delayed release tablet: 1 tab(s) orally once a day  PROzac 20 mg oral capsule: 1 cap(s) orally once a day  telmisartan 80 mg oral tablet: 1 tab(s) orally once a day

## 2021-01-07 NOTE — DISCHARGE NOTE PROVIDER - HOSPITAL COURSE
70F w/ hx of prior left cerebellar stroke, HTN, HLD, TIA, right macular degeneration who presented to the ED from home for dizziness, hypertension and leaning to the right when walking since 4:55 am this morning and admitted for dizziness. Imaging negative for acute hemorrhage, is admitted to r/o stroke and hypertensive emergency. On day of discharge, BP well controlled and repeat CT scan __________    #Hypertensive emergency.    Plan: Patient with BP in 190s-220s/80-100s p/w dizziness.   - s/p norvasc and coreg in ED  - will lower MAP by 25% in first hour with goal BP ~160/100 for next 4-6 hours  - c/w home meds starting tomorrow as below.     #R/O Stroke.    Plan: Patient with hx of cerebellar stroke. CTH and CTA negative. Ddz repeat stroke vs peripheral causes of dizziness like BPPV less likely orthostatic hypotension or cardiac causes  - ASA 81mg PO daily and Plavix 75mg PO daily  - Atorvastatin 80mg PO daily  - A1C 5.8   - f/u lipid panel  - will repeat CTH tomorrow AM.       New medications: none 70F w/ hx of prior left cerebellar stroke, HTN, HLD, TIA, right macular degeneration who presented to the ED from home for dizziness, hypertension and leaning to the right when walking since 4:55 am this morning and admitted for dizziness. Imaging negative for acute hemorrhage, is admitted to r/o stroke and hypertensive emergency. On day of discharge, BP well controlled and repeat CT scan __________    #Hypertensive emergency.    Plan: Patient with BP in 190s-220s/80-100s p/w dizziness.   - s/p norvasc and coreg in ED  - c/w home meds amlodipine 10mg, losartan 100 (tx interchange telmisartan 80), and carvedilol 12.5 BID    #R/O Stroke.    Plan: Patient with hx of cerebellar stroke. CTH and CTA negative. Ddz repeat stroke vs peripheral causes of dizziness like BPPV less likely orthostatic hypotension or cardiac causes  - ASA 81mg PO daily and Plavix 75mg PO daily  - Atorvastatin 80mg PO daily  - A1C 5.8   - repeat CTH      New medications: 70F w/ hx of prior left cerebellar stroke, HTN, HLD, TIA, right macular degeneration who presented to the ED from home for dizziness, hypertension and leaning to the right when walking since 4:55 am this morning and admitted for dizziness. Imaging negative for acute hemorrhage, is admitted to r/o stroke and hypertensive emergency. On day of discharge, BP well controlled and repeat CT scan negative.    #Hypertensive emergency.    Plan: Patient with BP in 190s-220s/80-100s p/w dizziness.   - s/p norvasc and coreg in ED  - c/w home meds amlodipine 10mg, losartan 100 (tx interchange telmisartan 80), and carvedilol 12.5 BID    #R/O Stroke.    Plan: Patient with hx of cerebellar stroke. CTH and CTA negative. Ddz repeat stroke vs peripheral causes of dizziness like BPPV less likely orthostatic hypotension or cardiac causes  - ASA 81mg PO daily and Plavix 75mg PO daily  - Atorvastatin 80mg PO daily  - A1C 5.8   - repeat CTH      New medications: none 70F w/ hx of prior left cerebellar stroke, HTN, HLD, TIA, right macular degeneration who presented to the ED from home for dizziness, hypertension and leaning to the right when walking since 4:55 am this morning and admitted for dizziness. Imaging negative for acute hemorrhage, is admitted to r/o stroke and hypertensive emergency. On day of discharge, BP well controlled and repeat CT scan negative.    #Hypertensive emergency.    Plan: Patient with BP in 190s-220s/80-100s p/w dizziness.   - s/p norvasc and coreg in ED  - c/w home meds amlodipine 10mg, losartan 100 (tx interchange telmisartan 80), and carvedilol 12.5 BID    #R/O Stroke.    Plan: Patient with hx of cerebellar stroke. CTH and CTA negative. Ddz repeat stroke vs peripheral causes of dizziness like BPPV less likely orthostatic hypotension or cardiac causes  - ASA 81mg PO daily and Plavix 75mg PO daily  - Atorvastatin 80mg PO daily  - A1C 5.8   - repeat CTH negative      New medications: none

## 2021-01-07 NOTE — DISCHARGE NOTE NURSING/CASE MANAGEMENT/SOCIAL WORK - NSDCPEWEB_GEN_ALL_CORE
[facility] for Tobacco Control website --- [email or web address] website --- [email or web address]

## 2021-01-07 NOTE — DISCHARGE NOTE NURSING/CASE MANAGEMENT/SOCIAL WORK - NSDCPEEMAIL_GEN_ALL_CORE
[facility] for Tobacco Control email [email or web address]

## 2021-01-07 NOTE — PROGRESS NOTE ADULT - PROBLEM SELECTOR PLAN 1
Patient with BP in 190s-220s/80-100s p/w dizziness.   - s/p norvasc and coreg in ED  - will lower MAP by 25% in first hour with goal BP ~160/100 for next 4-6 hours  - c/w home meds starting tomorrow as below [RESOLVED]  Patient with BP in 190s-220s/80-100s p/w dizziness.   - s/p norvasc and coreg in ED  - c/w home medications amlodipine 10mg, losartan 100 (tx interchange telmisartan 80), and carvedilol 12.5 BID  - BP at goal now

## 2021-01-07 NOTE — PROGRESS NOTE ADULT - PROBLEM SELECTOR PLAN 4
- c/w home medications amlodipine 10mg, losartan 100 (tx interchange telmisartan 80), and carvedilol 12.5 BID

## 2021-01-07 NOTE — PROGRESS NOTE ADULT - ASSESSMENT
70 year old female with,    # Dizziness: symptoms have resolved, no e/o CVA. C/w ASA/plavix/statin. DVT PPx with LMWH.    # History of prior CVA: c/w ASA/plavix/statin as above.    # HTN: C/w home meds-carvedilol, amlodipine and telmisartan. Will see Dr. Reyes in clinic tomorrow. Encourage home BP machine.    # Hyperlipidemia: c/w statin.    # Pre-diabetes: with A1C of 5.8. Monitor BS for now.    # Smoker: quit 4 days ago, continue to encourage smoking cessation. Due for yearly CT chest for lung cancer screening. Outpatient follow up with Dr. Griffin.    # Anxiety and depression: c/w prozac, ativan as needed.    Elda Abdi MD  Hospitalist Attending  [phone]
70F w/ hx of prior left cerebellar stroke, HTN, HLD, TIA, right macular degeneration who presented to the ED from home for dizziness, hypertension and leaning to the right when walking since 4:55 am this morning and admitted for dizziness. Imaging negative for acute hemorrhage, is admitted to r/o stroke and hypertensive emergency.

## 2021-01-07 NOTE — DISCHARGE NOTE PROVIDER - NSDCFUSCHEDAPPT_GEN_ALL_CORE_FT
CHERELLE MARTINEZ ; 02/26/2021 ; [facility] [address] CHERELLE MARTINEZ ; 01/08/2021 ; [facility] [address]  CHERELLE MARTINEZ ; 02/26/2021 ; [facility] [address]

## 2021-01-07 NOTE — PROGRESS NOTE ADULT - SUBJECTIVE AND OBJECTIVE BOX
OVERNIGHT EVENTS:    SUBJECTIVE / INTERVAL HPI: Patient seen and examined at bedside.     VITAL SIGNS:  Vital Signs Last 24 Hrs  T(C): 36.7 (2021 04:43), Max: 36.8 (2021 12:22)  T(F): 98 (2021 04:43), Max: 98.2 (2021 12:22)  HR: 62 (2021 04:00) (62 - 74)  BP: 141/67 (2021 04:00) (121/62 - 228/105)  BP(mean): 96 (2021 04:00) (84 - 97)  RR: 18 (2021 04:00) (16 - 18)  SpO2: 98% (2021 04:00) (92% - 98%)  I&O's Summary      PHYSICAL EXAM:    General: WDWN  HEENT: NC/AT; PERRL, anicteric sclera; MMM  Neck: supple  Cardiovascular: +S1/S2; RRR  Respiratory: CTA B/L; no W/R/R  Gastrointestinal: soft, NT/ND; +BSx4  Extremities: WWP; no edema, clubbing or cyanosis  Vascular: 2+ radial, DP/PT pulses B/L  Neurological: AAOx3; no focal deficits    MEDICATIONS:  MEDICATIONS  (STANDING):  amLODIPine   Tablet 10 milliGRAM(s) Oral every 24 hours  aspirin  chewable 81 milliGRAM(s) Oral daily  atorvastatin 80 milliGRAM(s) Oral at bedtime  carvedilol 12.5 milliGRAM(s) Oral every 12 hours  clopidogrel Tablet 75 milliGRAM(s) Oral daily  enoxaparin Injectable 40 milliGRAM(s) SubCutaneous every 24 hours  FLUoxetine 60 milliGRAM(s) Oral at bedtime  losartan 100 milliGRAM(s) Oral daily  pantoprazole    Tablet 40 milliGRAM(s) Oral before breakfast    MEDICATIONS  (PRN):  acetaminophen   Tablet .. 650 milliGRAM(s) Oral every 6 hours PRN Moderate Pain (4 - 6)      ALLERGIES:  Allergies    penicillin (Other)    Intolerances        LABS:                        14.1   7.32  )-----------( 216      ( 2021 06:27 )             42.1     01-06    140  |  101  |  14  ----------------------------<  111<H>  3.9   |  26  |  0.59    Ca    10.1      2021 06:27    TPro  6.8  /  Alb  4.2  /  TBili  0.8  /  DBili  x   /  AST  15  /  ALT  14  /  AlkPhos  77  -    PT/INR - ( 2021 06:27 )   PT: 12.1 sec;   INR: 1.01          PTT - ( 2021 06:27 )  PTT:36.0 sec  Urinalysis Basic - ( 2021 07:16 )    Color: Yellow / Appearance: Clear / S.015 / pH: x  Gluc: x / Ketone: NEGATIVE  / Bili: Negative / Urobili: 0.2 E.U./dL   Blood: x / Protein: NEGATIVE mg/dL / Nitrite: NEGATIVE   Leuk Esterase: NEGATIVE / RBC: x / WBC x   Sq Epi: x / Non Sq Epi: x / Bacteria: x      CAPILLARY BLOOD GLUCOSE          RADIOLOGY & ADDITIONAL TESTS: Reviewed.   OVERNIGHT EVENTS: VALERIA    SUBJECTIVE / INTERVAL HPI: Patient seen and examined at bedside. Reports improvement in dizziness, denies HA, chest pain/palpitations, nausea, vomiting, SOB, abdominal pain, LE pain/swelling.    VITAL SIGNS:  Vital Signs Last 24 Hrs  T(C): 36.7 (2021 04:43), Max: 36.8 (2021 12:22)  T(F): 98 (2021 04:43), Max: 98.2 (2021 12:22)  HR: 62 (2021 04:00) (62 - 74)  BP: 141/67 (2021 04:00) (121/62 - 228/105)  BP(mean): 96 (2021 04:00) (84 - 97)  RR: 18 (2021 04:00) (16 - 18)  SpO2: 98% (2021 04:00) (92% - 98%)  I&O's Summary      PHYSICAL EXAM:    General: NAD  HEENT: NC/AT; PERRL, anicteric sclera; MMM  Cardiovascular: +S1/S2; RRR  Respiratory: CTA B/L; no W/R/R  Gastrointestinal: soft, NT/ND; +BSx4  Extremities: WWP; no edema  Vascular: 2+ radial, DP/PT pulses B/L  Neurological: AAOx3; no focal deficits    MEDICATIONS:  MEDICATIONS  (STANDING):  amLODIPine   Tablet 10 milliGRAM(s) Oral every 24 hours  aspirin  chewable 81 milliGRAM(s) Oral daily  atorvastatin 80 milliGRAM(s) Oral at bedtime  carvedilol 12.5 milliGRAM(s) Oral every 12 hours  clopidogrel Tablet 75 milliGRAM(s) Oral daily  enoxaparin Injectable 40 milliGRAM(s) SubCutaneous every 24 hours  FLUoxetine 60 milliGRAM(s) Oral at bedtime  losartan 100 milliGRAM(s) Oral daily  pantoprazole    Tablet 40 milliGRAM(s) Oral before breakfast    MEDICATIONS  (PRN):  acetaminophen   Tablet .. 650 milliGRAM(s) Oral every 6 hours PRN Moderate Pain (4 - 6)      ALLERGIES:  Allergies    penicillin (Other)    Intolerances        LABS:                        14.1   7.32  )-----------( 216      ( 2021 06:27 )             42.1     01-06    140  |  101  |  14  ----------------------------<  111<H>  3.9   |  26  |  0.59    Ca    10.1      2021 06:27    TPro  6.8  /  Alb  4.2  /  TBili  0.8  /  DBili  x   /  AST  15  /  ALT  14  /  AlkPhos  77  -06    PT/INR - ( 2021 06:27 )   PT: 12.1 sec;   INR: 1.01          PTT - ( 2021 06:27 )  PTT:36.0 sec  Urinalysis Basic - ( 2021 07:16 )    Color: Yellow / Appearance: Clear / S.015 / pH: x  Gluc: x / Ketone: NEGATIVE  / Bili: Negative / Urobili: 0.2 E.U./dL   Blood: x / Protein: NEGATIVE mg/dL / Nitrite: NEGATIVE   Leuk Esterase: NEGATIVE / RBC: x / WBC x   Sq Epi: x / Non Sq Epi: x / Bacteria: x      CAPILLARY BLOOD GLUCOSE          RADIOLOGY & ADDITIONAL TESTS: Reviewed.

## 2021-01-07 NOTE — DISCHARGE NOTE PROVIDER - NSDCCPCAREPLAN_GEN_ALL_CORE_FT
PRINCIPAL DISCHARGE DIAGNOSIS  Diagnosis: Dizziness  Assessment and Plan of Treatment: You were admitted to the hospital with dizziness. Some causes of dizziness include blood pressure that is too high or too low, certain types of stroke, and abnormalities in your ear canal. You were found to have very high blood pressure on admission and were treated with IV and oral medications. You also had imaging that did not show evidence of stroke. Please follow up with an outpatient neurologist for further evaluation of the causes of dizziness.       PRINCIPAL DISCHARGE DIAGNOSIS  Diagnosis: Dizziness  Assessment and Plan of Treatment: You were admitted to the hospital with dizziness. Some causes of dizziness include blood pressure that is too high or too low, certain types of stroke, and abnormalities in your ear canal. You were found to have very high blood pressure on admission and were treated with IV and oral medications. You also had imaging that did not show evidence of stroke. If you develop difficulty speaking, sudden weakness in your arms or legs, or chest pain, seek medical attention immediately. Please follow up with your cardiologist regularly and an outpatient neurologist for further evaluation of the causes of dizziness.       PRINCIPAL DISCHARGE DIAGNOSIS  Diagnosis: Hypertensive emergency  Assessment and Plan of Treatment: You were admitted to the hospital with dizziness. Some causes of dizziness include blood pressure that is too high or too low, certain types of stroke, and abnormalities in your ear canal. You were found to have very high blood pressure on admission and were treated with IV and oral medications. All your home medications were restarted and your blood pressure is well controlled on them. Continue to take your blood pressure medications as prescribed and check your BP at home regularly. You also had imaging that did not show evidence of stroke. If you develop difficulty speaking, sudden weakness in your arms or legs, or chest pain, seek medical attention immediately. Please follow up with your cardiologist regularly and an outpatient neurologist for further evaluation of the causes of dizziness.       PRINCIPAL DISCHARGE DIAGNOSIS  Diagnosis: Hypertensive emergency  Assessment and Plan of Treatment: You were admitted to the hospital with dizziness. Some causes of dizziness include blood pressure that is too high or too low, certain types of stroke, and abnormalities in your ear canal. You were found to have very high blood pressure on admission and were treated with IV and oral medications. All your home medications were restarted and your blood pressure is well controlled on them. Continue to take your blood pressure medications as prescribed (amlodipine 10m one tablet once daily, telmisartan 80mg once daily, and carvedilol 12.5mg twice daily) and check your BP at home regularly. You also had imaging that did not show evidence of stroke. If you develop difficulty speaking, sudden weakness in your arms or legs, or chest pain, seek medical attention immediately. Please follow up with your cardiologist regularly and an outpatient neurologist for further evaluation of the causes of dizziness.

## 2021-01-07 NOTE — DISCHARGE NOTE PROVIDER - CARE PROVIDERS DIRECT ADDRESSES
[email or web address]

## 2021-01-08 ENCOUNTER — APPOINTMENT (OUTPATIENT)
Dept: HEART AND VASCULAR | Facility: CLINIC | Age: 71
End: 2021-01-08
Payer: MEDICARE

## 2021-01-08 ENCOUNTER — NON-APPOINTMENT (OUTPATIENT)
Age: 71
End: 2021-01-08

## 2021-01-08 VITALS
WEIGHT: 186 LBS | BODY MASS INDEX: 29.89 KG/M2 | TEMPERATURE: 98.5 F | SYSTOLIC BLOOD PRESSURE: 136 MMHG | HEART RATE: 59 BPM | OXYGEN SATURATION: 97 % | HEIGHT: 66 IN | DIASTOLIC BLOOD PRESSURE: 72 MMHG

## 2021-01-08 DIAGNOSIS — E78.49 OTHER HYPERLIPIDEMIA: ICD-10-CM

## 2021-01-08 LAB
-  AMPICILLIN: SIGNIFICANT CHANGE UP
-  CLINDAMYCIN: SIGNIFICANT CHANGE UP
-  ERYTHROMYCIN: SIGNIFICANT CHANGE UP
-  LEVOFLOXACIN: SIGNIFICANT CHANGE UP
-  PENICILLIN: SIGNIFICANT CHANGE UP
-  VANCOMYCIN: SIGNIFICANT CHANGE UP
CULTURE RESULTS: SIGNIFICANT CHANGE UP
METHOD TYPE: SIGNIFICANT CHANGE UP
ORGANISM # SPEC MICROSCOPIC CNT: SIGNIFICANT CHANGE UP
ORGANISM # SPEC MICROSCOPIC CNT: SIGNIFICANT CHANGE UP
SPECIMEN SOURCE: SIGNIFICANT CHANGE UP

## 2021-01-08 PROCEDURE — 99072 ADDL SUPL MATRL&STAF TM PHE: CPT

## 2021-01-08 PROCEDURE — 99214 OFFICE O/P EST MOD 30 MIN: CPT

## 2021-01-08 NOTE — ASSESSMENT
[FreeTextEntry1] : htn no diuretics may have createdr LVOT obstruction vs her BP not high out side of the office \par carotid stenosis on atorvastatin per neuro now on aspirin no plavix \par smoking cessation started smoking may have ILD seeing pulmonary\par asymetric septal hypertrophy no outflow gradient continue current meds hydration no further work up\par fu in three months

## 2021-01-08 NOTE — HISTORY OF PRESENT ILLNESS
[FreeTextEntry1] : 70 F with tia htn carotid stenosis ccta 2016 normal coronaries echo asymmetric septal hypertrophy with dynamic outflow tract obstruction peak gradient 47 resting 14 mmHg. negative genetic testing negative CMRI ILD ARNOL has went to ED for episode of fogginess dizzy off balance and leaning to side negative stroke work up \par \par ecg nsr lvh nsst changes

## 2021-01-09 ENCOUNTER — RESULT REVIEW (OUTPATIENT)
Age: 71
End: 2021-01-09

## 2021-01-09 ENCOUNTER — OUTPATIENT (OUTPATIENT)
Dept: OUTPATIENT SERVICES | Facility: HOSPITAL | Age: 71
LOS: 1 days | End: 2021-01-09
Payer: MEDICARE

## 2021-01-09 ENCOUNTER — APPOINTMENT (OUTPATIENT)
Dept: CT IMAGING | Facility: HOSPITAL | Age: 71
End: 2021-01-09

## 2021-01-09 PROCEDURE — 71250 CT THORAX DX C-: CPT | Mod: 26

## 2021-01-09 PROCEDURE — 71250 CT THORAX DX C-: CPT

## 2021-01-11 ENCOUNTER — NON-APPOINTMENT (OUTPATIENT)
Age: 71
End: 2021-01-11

## 2021-01-12 DIAGNOSIS — I66.8 OCCLUSION AND STENOSIS OF OTHER CEREBRAL ARTERIES: ICD-10-CM

## 2021-01-12 DIAGNOSIS — Z82.3 FAMILY HISTORY OF STROKE: ICD-10-CM

## 2021-01-12 DIAGNOSIS — I16.1 HYPERTENSIVE EMERGENCY: ICD-10-CM

## 2021-01-12 DIAGNOSIS — F17.211 NICOTINE DEPENDENCE, CIGARETTES, IN REMISSION: ICD-10-CM

## 2021-01-12 DIAGNOSIS — I10 ESSENTIAL (PRIMARY) HYPERTENSION: ICD-10-CM

## 2021-01-12 DIAGNOSIS — F41.9 ANXIETY DISORDER, UNSPECIFIED: ICD-10-CM

## 2021-01-12 DIAGNOSIS — H35.30 UNSPECIFIED MACULAR DEGENERATION: ICD-10-CM

## 2021-01-12 DIAGNOSIS — Z83.2 FAMILY HISTORY OF DISEASES OF THE BLOOD AND BLOOD-FORMING ORGANS AND CERTAIN DISORDERS INVOLVING THE IMMUNE MECHANISM: ICD-10-CM

## 2021-01-12 DIAGNOSIS — Z79.82 LONG TERM (CURRENT) USE OF ASPIRIN: ICD-10-CM

## 2021-01-12 DIAGNOSIS — Z79.02 LONG TERM (CURRENT) USE OF ANTITHROMBOTICS/ANTIPLATELETS: ICD-10-CM

## 2021-01-12 DIAGNOSIS — Z79.899 OTHER LONG TERM (CURRENT) DRUG THERAPY: ICD-10-CM

## 2021-01-12 DIAGNOSIS — Z11.52 ENCOUNTER FOR SCREENING FOR COVID-19: ICD-10-CM

## 2021-01-12 DIAGNOSIS — F32.9 MAJOR DEPRESSIVE DISORDER, SINGLE EPISODE, UNSPECIFIED: ICD-10-CM

## 2021-01-12 DIAGNOSIS — Z86.73 PERSONAL HISTORY OF TRANSIENT ISCHEMIC ATTACK (TIA), AND CEREBRAL INFARCTION WITHOUT RESIDUAL DEFICITS: ICD-10-CM

## 2021-01-12 DIAGNOSIS — Z71.6 TOBACCO ABUSE COUNSELING: ICD-10-CM

## 2021-01-12 DIAGNOSIS — E78.5 HYPERLIPIDEMIA, UNSPECIFIED: ICD-10-CM

## 2021-01-12 DIAGNOSIS — Z82.49 FAMILY HISTORY OF ISCHEMIC HEART DISEASE AND OTHER DISEASES OF THE CIRCULATORY SYSTEM: ICD-10-CM

## 2021-01-12 DIAGNOSIS — Z88.0 ALLERGY STATUS TO PENICILLIN: ICD-10-CM

## 2021-01-12 DIAGNOSIS — R73.03 PREDIABETES: ICD-10-CM

## 2021-01-14 ENCOUNTER — TRANSCRIPTION ENCOUNTER (OUTPATIENT)
Age: 71
End: 2021-01-14

## 2021-01-15 ENCOUNTER — APPOINTMENT (OUTPATIENT)
Dept: INTERNAL MEDICINE | Facility: CLINIC | Age: 71
End: 2021-01-15
Payer: MEDICARE

## 2021-01-15 VITALS
DIASTOLIC BLOOD PRESSURE: 68 MMHG | TEMPERATURE: 98.8 F | WEIGHT: 184 LBS | OXYGEN SATURATION: 96 % | SYSTOLIC BLOOD PRESSURE: 124 MMHG | BODY MASS INDEX: 29.57 KG/M2 | HEART RATE: 68 BPM | HEIGHT: 66 IN

## 2021-01-15 PROCEDURE — 99072 ADDL SUPL MATRL&STAF TM PHE: CPT

## 2021-01-15 PROCEDURE — 99495 TRANSJ CARE MGMT MOD F2F 14D: CPT

## 2021-01-20 ENCOUNTER — APPOINTMENT (OUTPATIENT)
Dept: NEUROLOGY | Facility: CLINIC | Age: 71
End: 2021-01-20

## 2021-01-21 PROCEDURE — 81003 URINALYSIS AUTO W/O SCOPE: CPT

## 2021-01-21 PROCEDURE — 97161 PT EVAL LOW COMPLEX 20 MIN: CPT

## 2021-01-21 PROCEDURE — 87086 URINE CULTURE/COLONY COUNT: CPT

## 2021-01-21 PROCEDURE — 99285 EMERGENCY DEPT VISIT HI MDM: CPT

## 2021-01-21 PROCEDURE — U0003: CPT

## 2021-01-21 PROCEDURE — 83735 ASSAY OF MAGNESIUM: CPT

## 2021-01-21 PROCEDURE — 84100 ASSAY OF PHOSPHORUS: CPT

## 2021-01-21 PROCEDURE — 70498 CT ANGIOGRAPHY NECK: CPT

## 2021-01-21 PROCEDURE — 85730 THROMBOPLASTIN TIME PARTIAL: CPT

## 2021-01-21 PROCEDURE — 80053 COMPREHEN METABOLIC PANEL: CPT

## 2021-01-21 PROCEDURE — 80048 BASIC METABOLIC PNL TOTAL CA: CPT

## 2021-01-21 PROCEDURE — G0378: CPT

## 2021-01-21 PROCEDURE — 85025 COMPLETE CBC W/AUTO DIFF WBC: CPT

## 2021-01-21 PROCEDURE — 70496 CT ANGIOGRAPHY HEAD: CPT

## 2021-01-21 PROCEDURE — 83036 HEMOGLOBIN GLYCOSYLATED A1C: CPT

## 2021-01-21 PROCEDURE — 85610 PROTHROMBIN TIME: CPT

## 2021-01-21 PROCEDURE — U0005: CPT

## 2021-01-21 PROCEDURE — 70450 CT HEAD/BRAIN W/O DYE: CPT

## 2021-01-21 PROCEDURE — 36415 COLL VENOUS BLD VENIPUNCTURE: CPT

## 2021-01-21 PROCEDURE — 85027 COMPLETE CBC AUTOMATED: CPT

## 2021-01-21 PROCEDURE — 80061 LIPID PANEL: CPT

## 2021-01-21 PROCEDURE — 87184 SC STD DISK METHOD PER PLATE: CPT

## 2021-01-25 ENCOUNTER — TRANSCRIPTION ENCOUNTER (OUTPATIENT)
Age: 71
End: 2021-01-25

## 2021-01-28 ENCOUNTER — APPOINTMENT (OUTPATIENT)
Dept: PULMONOLOGY | Facility: CLINIC | Age: 71
End: 2021-01-28

## 2021-02-11 ENCOUNTER — TRANSCRIPTION ENCOUNTER (OUTPATIENT)
Age: 71
End: 2021-02-11

## 2021-02-13 ENCOUNTER — TRANSCRIPTION ENCOUNTER (OUTPATIENT)
Age: 71
End: 2021-02-13

## 2021-02-16 ENCOUNTER — APPOINTMENT (OUTPATIENT)
Dept: PULMONOLOGY | Facility: CLINIC | Age: 71
End: 2021-02-16
Payer: MEDICARE

## 2021-02-16 VITALS
WEIGHT: 186 LBS | HEART RATE: 68 BPM | HEIGHT: 66 IN | SYSTOLIC BLOOD PRESSURE: 129 MMHG | TEMPERATURE: 97.2 F | DIASTOLIC BLOOD PRESSURE: 75 MMHG | BODY MASS INDEX: 29.89 KG/M2 | OXYGEN SATURATION: 95 %

## 2021-02-16 PROCEDURE — 99072 ADDL SUPL MATRL&STAF TM PHE: CPT

## 2021-02-16 PROCEDURE — 99215 OFFICE O/P EST HI 40 MIN: CPT

## 2021-02-16 NOTE — DISCUSSION/SUMMARY
[FreeTextEntry1] : 2-16-21:\par Attending's Summary\par -no pallor or icterus \par -no dullness, good breath sounds bilaterally, no wheezing, rhonchi or crackles \par -2/6 systolic murmur at aortic area, not radiating to left sternal border \par -no skin thickening, no palmar erythema \par -no edema

## 2021-02-16 NOTE — PROCEDURE
[FreeTextEntry1] : CT chest 2021:\par Impression:\par 1. 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 5 mm solid nodule right upper lobe-stable.\par \par EXAM: CT LDCT LUNG CA SCRN ANNUAL PROCEDURE DATE: 2020 \par Additional imaging was performed in the prone position complete evaluation of the dependent regions of the lungs. \par Lungs and airways: Image numbers for description of nodule location refer to thin section axial series number 4. \par 1.1 cm air cyst within the anterior segment of the right upper lobe (image 155). There are scattered solid subcentimeter and micronodules as well as benign perifissural lymph nodes. The largest solid nodule measures 5 mm, is noted within the right upper lobe laterally (image 165). There is a peripheral predominantly lower lung zone reticular and groundglass pattern which persists on prone imaging. There may be some subpleural sparing. There is temporal homogeneity. There is no evidence of pulmonary fibrosis. These constellations of findings are suggestive of NSIP. \par Trachea and central bronchi patent. \par Pleura: The pleural spaces are clear. \par Base of neck, mediastinum and heart: The thyroid gland is normal. No mediastinal, hilar or axillary lymphadenopathy is seen. The heart and pericardium are within normal limits. Mild calcification of the aortic valve. Small calcified atheromatous plaque formation is noted throughout the thoracic aorta and proximal abdominal aorta and side branches. \par Coronary artery calcification: None \par Soft tissues: Normal. \par Abdomen: Small type I hiatal hernia. \par Bones: Mild to moderate multilevel degenerative disc disease. Small benign-appearing bone island involving T5 \par Impression: \par 1. Peripheral reticular and groundglass lower lung zone pattern with temporal homogeneity which persists on prone imaging. There may be some mild subpleural sparing. There is no pulmonary fibrosis. 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 Abbreviated differential includes LIP and DIP. \par 2. Scattered solid subcentimeter and micronodules measuring up to 5 mm within the right upper lobe. \par Lung-RADS category: 2S - Benign appearance or behavior. Nodules with very low likelihood of becoming a clinically active cancer due to size or lack of growth. Probability of malignancy < 1%. \par \par Spirometry, DLCO 10/3/19:\par FVC: 2.84 L (96%)\par FEV1: 2.23 L (95%)\par FEV1/FVC: 79%\par [id number]%: 2.12 L/s (88%)\par DLCO: 17.7 (82%)\par Normal spirometry. Normal diffusion capacity.\par \par CPAP titration 19: recommended 10 cm H2O, autoPAP was ordered\par PSG 3/13/19: AHI CMS 32, min SpO2 79%, time <= 88% 1.2 min\par \par PFT, 6MWT 19:\par FVC: 2.72 L (92%)\par FEV1: 2.38 L (101%)\par FEV1/FVC: 87%\par [id number]%: 3.20 L/s (133%)\par T.23 L (84%)\par RV/T%\par DLCO: 15.0 (69%)\par 6MWT: 439 meters, SpO2 start: 97% --> SpO2 end: 95%\par Normal spirometry. Lung volumes normal. Mildly reduced diffusion capacity. Normal walk distance with no desaturation. \par \par EXAM: CT LDCT LUNG CA SCRN BASELINE \par *** ADDENDUM 2019 *** \par Addendum: \par Abbreviated differential for these peripheral groundglass opacities in the setting of smoking must extend to include DIP. Bronchoscopic correlation and additional HRCT imaging to include end inspiration/end expiration is suggested. \par *** END OF ADDENDUM 2019 *** \par \par PROCEDURE DATE: 2019 \par INTERPRETATION: CT scan of the chest without intravenous contrast, using low-dose lung cancer screening protocol \par History: 65-year-old female nonsmoker with a 37.5 pack year history of smoking \par Comparison: None. \par Findings: \par Lungs and airways: Image numbers for description of nodule location refer to thin section axial series number 4. Scattered calcified granulomata. 4 mm nodule within the right upper lobe (image 166). Is a mild peripheral reticular and groundglass opacities which demonstrates temporal homogeneity with a suggestion of some subpleural sparing involving the upper and lower lung zones slightly more pronounced inferiorly. There are some scattered tree-in-bud centrilobular micronodules. No significant air-trapping is noted. 9 mm air cyst is noted within the lingula (image 167) \par Trachea and central bronchi patent. \par Pleura: The pleural spaces are clear. \par Base of neck, mediastinum and heart: The thyroid gland demonstrates coarse parenchymal calcifications. No mediastinal, hilar or axillary lymphadenopathy is seen. The heart and pericardium are within normal limits. \par Mild calcified atheromatous plaque formation involving the thoracic aorta most pronounced in the arch and proximal abdominal regions \par Coronary artery calcification: None \par Soft tissues: Normal. \par Abdomen: This study was performed without contrast and with lower than standard dose. These factors reduce sensitivity for detection of small lesions in the upper abdomen. Given these technical limitations, no focal lesion is seen within the visualized organs. \par Bones: Mild age-appropriate degenerative change. Small sclerotic focus involving the T5 vertebral body compatible with a probable bone island. \par Impression: \par 1. Scattered tree-in-bud centrilobular micronodules with mild small airway inflammation. No significant air-trapping is identified. \par 2. Mild peripheral reticular and groundglass opacities involving all 5 pulmonary lobes with a slightly lower lung zone predilection. There is relative temporal homogeneity with possibly some subpleural sparing. No \par pulmonary fibrosis. Abbreviated differential includes an NSIP pattern. \par Respiratory bronchiolitis although centrilobular groundglass nodules are not well-visualized, and LIP cannot be excluded. Further imaging to include HRCT to be performed at end inspiration/end expiration is suggested for more complete evaluation \par 3. 4 mm nodule within the right upper lobe. Scattered calcified granulomata. \par Lung-RADS category: 2S - Benign appearance or behavior. Nodules with very low likelihood of becoming a clinically active cancer due to size or lack of growth. Probability of malignancy < 1%. \par Recommendation: \par 2S - Continue annual screening with LDCT in 12 months.

## 2021-02-16 NOTE — ASSESSMENT
[FreeTextEntry1] : 2-16-21:\par It was a pleasure to see Rashmi in follow-up today.    Her respiratory issues are as follows:\par \par 1.   Interstitial lung abnormality (ISAAC)\par Rashmi had a CT chest as part of the lung cancer screening program on 1/25/19; it was her first dedicated chest CT.  It demonstrated peripheral reticular and groundglass opacities, particularly posteriorly. There were also some scattered TIB micronodules and a 9 mm  cyst in the lingula. While the posterior predilection of the groundglass opacities could have pointed toward dependent atelectasis, her repeat LCS CT on 2/4/20 demonstrated that the abnormalities were still present in the prone position. Findings are still predominantly groundglass, more prominent on the right side. She fulfills the definition of ISAAC as it was an incidental finding, she is not symptomatic. In lung cancer screened patients, ISAAC appears to be age related and is seen in up to 10% of patients\par In this patient, ISAAC features that portend good prognosis include predominantly ground glass opacities and absence of architectural distortion or traction bronchiectasis. Features that predict progression include subpleural location   \par \par At a prior visit, we checked HECTOR (negative), CH50 (WNL), and RF (18). We will recheck autoimmune labs today.\par PFTs on 2/19/19 were normal and showed no evidence of obstruction, DLCO is normal (82%). Her last 6MWT was a relatively normal test.\par We reviewed her chest CT from 1/9/2021, which was stable. There is a predominance of ground glass opacities. The CT picture is indeterminate for IPF.\par \par Plan:\par -We will repeat complete PFT and 6MWT. We will look to see if there is a drop in FVC and TLC drop > than 10% or a drop in diffusion capacity > than 15%. Based on PFT and 6MWT, we will discuss treatment with anti fibrotics (pirfenidone, nintedanib)\par -We will order an echocardiogram to evaluate right sided heart pressures\par \par 2.    Active smoking\par We had a detailed discussion about the effects of smoking on ISAAC, including its development and progression.  We discussed a study in CHEST that the incidence of lung cancer in the setting of ISAAC may be higher. She restarted smoking during the pandemic, 1/2 pack per day. She stopped smoking in January.  She has also been provided smoking cessation counseling by Lala Epperson DNP and was referred to the [facility] for Tobacco Control program.\par \par 3. Pulmonary nodule\par Right upper lobe nodule is stable, 5 mm. No significant change in size or morphology on recent CT done 1/2021\par \par Plan:\par - As per the Fleishner guidelines, she should have annual CT follow-up.  She will continue in the lung cancer screening program in January 2022\par \par 3.  ARNOL\par Risk factors included Mallampati score of IV and possible snoring.  Diagnosed with ARNOL but had difficulty tolerating CPAP.  She is followed by Dr. Streeter. We recommended she make a follow up appointment with Dr. Streeter so she can restart using her CPAP.\par \par Return 3 months

## 2021-02-16 NOTE — PHYSICAL EXAM
[No Acute Distress] : no acute distress [Normal Oropharynx] : normal oropharynx [Normal Appearance] : normal appearance [No Neck Mass] : no neck mass [Normal Rate/Rhythm] : normal rate/rhythm [No Resp Distress] : no resp distress [Clear to Auscultation Bilaterally] : clear to auscultation bilaterally [No Abnormalities] : no abnormalities [Benign] : benign [Normal Gait] : normal gait [No Clubbing] : no clubbing [No Cyanosis] : no cyanosis [No Edema] : no edema [FROM] : FROM [Normal Color/ Pigmentation] : normal color/ pigmentation [No Focal Deficits] : no focal deficits [Oriented x3] : oriented x3 [Normal Affect] : normal affect [TextBox_54] : 2/6 systolic murmur at aortic area, not radiating to left sternal border [TextBox_11] : no pallor or icterus [TextBox_68] : no pallor or icterus, no dullness, good breath sounds bilaterally, no wheezing, rhonchi or crackles  [TextBox_105] : no skin thickening, no palmar erythema

## 2021-02-17 ENCOUNTER — NON-APPOINTMENT (OUTPATIENT)
Age: 71
End: 2021-02-17

## 2021-02-17 LAB
ALBUMIN SERPL ELPH-MCNC: 4.1 G/DL
ALP BLD-CCNC: 80 U/L
ALT SERPL-CCNC: 19 U/L
ANION GAP SERPL CALC-SCNC: 14 MMOL/L
AST SERPL-CCNC: 15 U/L
BASOPHILS # BLD AUTO: 0.04 K/UL
BASOPHILS NFR BLD AUTO: 0.6 %
BILIRUB SERPL-MCNC: 0.7 MG/DL
BUN SERPL-MCNC: 15 MG/DL
CALCIUM SERPL-MCNC: 9.6 MG/DL
CH50 SERPL-MCNC: >104 U/ML
CHLORIDE SERPL-SCNC: 101 MMOL/L
CO2 SERPL-SCNC: 24 MMOL/L
CREAT SERPL-MCNC: 0.72 MG/DL
EOSINOPHIL # BLD AUTO: 0.14 K/UL
EOSINOPHIL NFR BLD AUTO: 1.9 %
GLUCOSE SERPL-MCNC: 92 MG/DL
HCT VFR BLD CALC: 43.8 %
HGB BLD-MCNC: 14.2 G/DL
IMM GRANULOCYTES NFR BLD AUTO: 0.1 %
LYMPHOCYTES # BLD AUTO: 2.05 K/UL
LYMPHOCYTES NFR BLD AUTO: 28.3 %
MAN DIFF?: NORMAL
MCHC RBC-ENTMCNC: 28.9 PG
MCHC RBC-ENTMCNC: 32.4 GM/DL
MCV RBC AUTO: 89.2 FL
MONOCYTES # BLD AUTO: 0.66 K/UL
MONOCYTES NFR BLD AUTO: 9.1 %
NEUTROPHILS # BLD AUTO: 4.35 K/UL
NEUTROPHILS NFR BLD AUTO: 60 %
PLATELET # BLD AUTO: 259 K/UL
POTASSIUM SERPL-SCNC: 4.2 MMOL/L
PROT SERPL-MCNC: 6.4 G/DL
RBC # BLD: 4.91 M/UL
RBC # FLD: 14.2 %
RHEUMATOID FACT SER QL: <10 IU/ML
SODIUM SERPL-SCNC: 139 MMOL/L
WBC # FLD AUTO: 7.25 K/UL

## 2021-02-18 LAB
ANA PAT FLD IF-IMP: ABNORMAL
ANA SER IF-ACNC: ABNORMAL

## 2021-03-01 ENCOUNTER — LABORATORY RESULT (OUTPATIENT)
Age: 71
End: 2021-03-01

## 2021-03-02 ENCOUNTER — NON-APPOINTMENT (OUTPATIENT)
Age: 71
End: 2021-03-02

## 2021-03-04 ENCOUNTER — OUTPATIENT (OUTPATIENT)
Dept: OUTPATIENT SERVICES | Facility: HOSPITAL | Age: 71
LOS: 1 days | End: 2021-03-04
Payer: MEDICARE

## 2021-03-04 DIAGNOSIS — R06.02 SHORTNESS OF BREATH: ICD-10-CM

## 2021-03-04 PROCEDURE — 94726 PLETHYSMOGRAPHY LUNG VOLUMES: CPT | Mod: 26

## 2021-03-04 PROCEDURE — 94010 BREATHING CAPACITY TEST: CPT | Mod: 26

## 2021-03-04 PROCEDURE — 94618 PULMONARY STRESS TESTING: CPT

## 2021-03-04 PROCEDURE — 94726 PLETHYSMOGRAPHY LUNG VOLUMES: CPT

## 2021-03-04 PROCEDURE — 94060 EVALUATION OF WHEEZING: CPT

## 2021-03-04 PROCEDURE — 94729 DIFFUSING CAPACITY: CPT | Mod: 26

## 2021-03-04 PROCEDURE — 94729 DIFFUSING CAPACITY: CPT

## 2021-03-04 PROCEDURE — 94618 PULMONARY STRESS TESTING: CPT | Mod: 26

## 2021-03-04 PROCEDURE — 94760 N-INVAS EAR/PLS OXIMETRY 1: CPT

## 2021-03-04 RX ORDER — ALBUTEROL 90 UG/1
2 AEROSOL, METERED ORAL ONCE
Refills: 0 | Status: DISCONTINUED | OUTPATIENT
Start: 2021-03-04 | End: 2021-03-18

## 2021-03-05 ENCOUNTER — NON-APPOINTMENT (OUTPATIENT)
Age: 71
End: 2021-03-05

## 2021-03-10 ENCOUNTER — APPOINTMENT (OUTPATIENT)
Dept: HEART AND VASCULAR | Facility: CLINIC | Age: 71
End: 2021-03-10
Payer: MEDICARE

## 2021-03-10 VITALS
HEIGHT: 66 IN | BODY MASS INDEX: 29.89 KG/M2 | DIASTOLIC BLOOD PRESSURE: 64 MMHG | OXYGEN SATURATION: 98 % | TEMPERATURE: 98.1 F | SYSTOLIC BLOOD PRESSURE: 132 MMHG | WEIGHT: 186 LBS | HEART RATE: 67 BPM

## 2021-03-10 DIAGNOSIS — R07.89 OTHER CHEST PAIN: ICD-10-CM

## 2021-03-10 PROCEDURE — 99214 OFFICE O/P EST MOD 30 MIN: CPT

## 2021-03-10 PROCEDURE — 99072 ADDL SUPL MATRL&STAF TM PHE: CPT

## 2021-03-10 PROCEDURE — 93306 TTE W/DOPPLER COMPLETE: CPT

## 2021-03-10 NOTE — ASSESSMENT
[FreeTextEntry1] : htn no diuretics may have created LVOT obstruction vs her BP not high out side of the office \par carotid stenosis on atorvastatin per neuro now on aspirin no plavix \par smoking cessation started smoking may have ILD seeing pulmonary\par asymetric septal hypertrophy no outflow gradient continue current meds hydration no further work up\par fu in 4 months

## 2021-03-10 NOTE — PHYSICAL EXAM
[General Appearance - Well Developed] : well developed [Normal Appearance] : normal appearance [Well Groomed] : well groomed [General Appearance - Well Nourished] : well nourished [No Deformities] : no deformities [General Appearance - In No Acute Distress] : no acute distress [Normal Conjunctiva] : the conjunctiva exhibited no abnormalities [Eyelids - No Xanthelasma] : the eyelids demonstrated no xanthelasmas [Normal Oral Mucosa] : normal oral mucosa [No Oral Pallor] : no oral pallor [No Oral Cyanosis] : no oral cyanosis [Normal Jugular Venous A Waves Present] : normal jugular venous A waves present [Normal Jugular Venous V Waves Present] : normal jugular venous V waves present [No Jugular Venous Burks A Waves] : no jugular venous burks A waves [Respiration, Rhythm And Depth] : normal respiratory rhythm and effort [Exaggerated Use Of Accessory Muscles For Inspiration] : no accessory muscle use [Auscultation Breath Sounds / Voice Sounds] : lungs were clear to auscultation bilaterally [Heart Rate And Rhythm] : heart rate and rhythm were normal [Heart Sounds] : normal S1 and S2 [Murmurs] : no murmurs present [FreeTextEntry1] : systolic murmur [Abdomen Soft] : soft [Abdomen Tenderness] : non-tender [Abdomen Mass (___ Cm)] : no abdominal mass palpated [Abnormal Walk] : normal gait [Gait - Sufficient For Exercise Testing] : the gait was sufficient for exercise testing [Nail Clubbing] : no clubbing of the fingernails [Cyanosis, Localized] : no localized cyanosis [Petechial Hemorrhages (___cm)] : no petechial hemorrhages [Skin Color & Pigmentation] : normal skin color and pigmentation [] : no rash [No Venous Stasis] : no venous stasis [Skin Lesions] : no skin lesions [No Skin Ulcers] : no skin ulcer [No Xanthoma] : no  xanthoma was observed [Oriented To Time, Place, And Person] : oriented to person, place, and time [Affect] : the affect was normal [Mood] : the mood was normal [No Anxiety] : not feeling anxious

## 2021-03-10 NOTE — HISTORY OF PRESENT ILLNESS
[FreeTextEntry1] : 70 F with tia htn carotid stenosis ccta 2016 normal coronaries echo asymmetric septal hypertrophy with dynamic outflow tract obstruction peak gradient 47 resting 14 mmHg. negative genetic testing negative CMRI ILD ARNOL feels great today has her usual chest pain which we determined was atypical \par \par \par ecg nsr lvh nsst changes

## 2021-03-11 ENCOUNTER — APPOINTMENT (OUTPATIENT)
Dept: PULMONOLOGY | Facility: CLINIC | Age: 71
End: 2021-03-11

## 2021-03-23 DIAGNOSIS — Z00.00 ENCOUNTER FOR GENERAL ADULT MEDICAL EXAMINATION W/OUT ABNORMAL FINDINGS: ICD-10-CM

## 2021-04-08 ENCOUNTER — APPOINTMENT (OUTPATIENT)
Dept: NEUROLOGY | Facility: CLINIC | Age: 71
End: 2021-04-08

## 2021-04-19 ENCOUNTER — NON-APPOINTMENT (OUTPATIENT)
Age: 71
End: 2021-04-19

## 2021-04-19 LAB — SARS-COV-2 N GENE NPH QL NAA+PROBE: NOT DETECTED

## 2021-04-21 ENCOUNTER — NON-APPOINTMENT (OUTPATIENT)
Age: 71
End: 2021-04-21

## 2021-04-21 ENCOUNTER — OUTPATIENT (OUTPATIENT)
Dept: OUTPATIENT SERVICES | Facility: HOSPITAL | Age: 71
LOS: 1 days | End: 2021-04-21
Payer: MEDICARE

## 2021-04-21 DIAGNOSIS — J84.9 INTERSTITIAL PULMONARY DISEASE, UNSPECIFIED: ICD-10-CM

## 2021-04-21 PROCEDURE — 94726 PLETHYSMOGRAPHY LUNG VOLUMES: CPT | Mod: 26

## 2021-04-21 PROCEDURE — 94729 DIFFUSING CAPACITY: CPT

## 2021-04-21 PROCEDURE — 94729 DIFFUSING CAPACITY: CPT | Mod: 26

## 2021-04-21 PROCEDURE — 94760 N-INVAS EAR/PLS OXIMETRY 1: CPT

## 2021-04-21 PROCEDURE — 94010 BREATHING CAPACITY TEST: CPT | Mod: 26

## 2021-04-21 PROCEDURE — 94060 EVALUATION OF WHEEZING: CPT

## 2021-04-21 PROCEDURE — 94726 PLETHYSMOGRAPHY LUNG VOLUMES: CPT

## 2021-04-26 ENCOUNTER — RX RENEWAL (OUTPATIENT)
Age: 71
End: 2021-04-26

## 2021-05-13 ENCOUNTER — NON-APPOINTMENT (OUTPATIENT)
Age: 71
End: 2021-05-13

## 2021-05-13 ENCOUNTER — APPOINTMENT (OUTPATIENT)
Dept: OPHTHALMOLOGY | Facility: CLINIC | Age: 71
End: 2021-05-13
Payer: MEDICARE

## 2021-05-13 PROCEDURE — 99072 ADDL SUPL MATRL&STAF TM PHE: CPT

## 2021-05-13 PROCEDURE — 92134 CPTRZ OPH DX IMG PST SGM RTA: CPT

## 2021-05-13 PROCEDURE — 92014 COMPRE OPH EXAM EST PT 1/>: CPT

## 2021-05-16 ENCOUNTER — RX RENEWAL (OUTPATIENT)
Age: 71
End: 2021-05-16

## 2021-05-24 ENCOUNTER — RX RENEWAL (OUTPATIENT)
Age: 71
End: 2021-05-24

## 2021-05-29 ENCOUNTER — EMERGENCY (EMERGENCY)
Facility: HOSPITAL | Age: 71
LOS: 1 days | Discharge: HOME | End: 2021-05-29
Attending: EMERGENCY MEDICINE | Admitting: EMERGENCY MEDICINE
Payer: MEDICARE

## 2021-05-29 VITALS
WEIGHT: 189.6 LBS | RESPIRATION RATE: 16 BRPM | HEIGHT: 66 IN | OXYGEN SATURATION: 94 % | TEMPERATURE: 98 F | DIASTOLIC BLOOD PRESSURE: 84 MMHG | SYSTOLIC BLOOD PRESSURE: 173 MMHG | HEART RATE: 80 BPM

## 2021-05-29 VITALS
TEMPERATURE: 98 F | HEART RATE: 82 BPM | RESPIRATION RATE: 17 BRPM | SYSTOLIC BLOOD PRESSURE: 158 MMHG | DIASTOLIC BLOOD PRESSURE: 58 MMHG | OXYGEN SATURATION: 95 %

## 2021-05-29 DIAGNOSIS — Z20.822 CONTACT WITH AND (SUSPECTED) EXPOSURE TO COVID-19: ICD-10-CM

## 2021-05-29 DIAGNOSIS — E78.5 HYPERLIPIDEMIA, UNSPECIFIED: ICD-10-CM

## 2021-05-29 DIAGNOSIS — Z88.0 ALLERGY STATUS TO PENICILLIN: ICD-10-CM

## 2021-05-29 DIAGNOSIS — R50.9 FEVER, UNSPECIFIED: ICD-10-CM

## 2021-05-29 DIAGNOSIS — F41.9 ANXIETY DISORDER, UNSPECIFIED: ICD-10-CM

## 2021-05-29 DIAGNOSIS — Z86.73 PERSONAL HISTORY OF TRANSIENT ISCHEMIC ATTACK (TIA), AND CEREBRAL INFARCTION WITHOUT RESIDUAL DEFICITS: ICD-10-CM

## 2021-05-29 DIAGNOSIS — K52.9 NONINFECTIVE GASTROENTERITIS AND COLITIS, UNSPECIFIED: ICD-10-CM

## 2021-05-29 DIAGNOSIS — I10 ESSENTIAL (PRIMARY) HYPERTENSION: ICD-10-CM

## 2021-05-29 DIAGNOSIS — R10.11 RIGHT UPPER QUADRANT PAIN: ICD-10-CM

## 2021-05-29 DIAGNOSIS — Z79.82 LONG TERM (CURRENT) USE OF ASPIRIN: ICD-10-CM

## 2021-05-29 LAB
ALBUMIN SERPL ELPH-MCNC: 3.6 G/DL — SIGNIFICANT CHANGE UP (ref 3.3–5)
ALP SERPL-CCNC: 67 U/L — SIGNIFICANT CHANGE UP (ref 40–120)
ALT FLD-CCNC: 17 U/L — SIGNIFICANT CHANGE UP (ref 10–45)
ANION GAP SERPL CALC-SCNC: 12 MMOL/L — SIGNIFICANT CHANGE UP (ref 5–17)
APPEARANCE UR: CLEAR — SIGNIFICANT CHANGE UP
APTT BLD: 32.4 SEC — SIGNIFICANT CHANGE UP (ref 27.5–35.5)
AST SERPL-CCNC: 17 U/L — SIGNIFICANT CHANGE UP (ref 10–40)
BACTERIA # UR AUTO: PRESENT /HPF
BASOPHILS # BLD AUTO: 0.03 K/UL — SIGNIFICANT CHANGE UP (ref 0–0.2)
BASOPHILS NFR BLD AUTO: 0.4 % — SIGNIFICANT CHANGE UP (ref 0–2)
BILIRUB SERPL-MCNC: 0.8 MG/DL — SIGNIFICANT CHANGE UP (ref 0.2–1.2)
BILIRUB UR-MCNC: NEGATIVE — SIGNIFICANT CHANGE UP
BUN SERPL-MCNC: 10 MG/DL — SIGNIFICANT CHANGE UP (ref 7–23)
CALCIUM SERPL-MCNC: 9.2 MG/DL — SIGNIFICANT CHANGE UP (ref 8.4–10.5)
CHLORIDE SERPL-SCNC: 101 MMOL/L — SIGNIFICANT CHANGE UP (ref 96–108)
CO2 SERPL-SCNC: 26 MMOL/L — SIGNIFICANT CHANGE UP (ref 22–31)
COLOR SPEC: YELLOW — SIGNIFICANT CHANGE UP
CREAT SERPL-MCNC: 0.47 MG/DL — LOW (ref 0.5–1.3)
DIFF PNL FLD: ABNORMAL
EOSINOPHIL # BLD AUTO: 0.1 K/UL — SIGNIFICANT CHANGE UP (ref 0–0.5)
EOSINOPHIL NFR BLD AUTO: 1.2 % — SIGNIFICANT CHANGE UP (ref 0–6)
EPI CELLS # UR: SIGNIFICANT CHANGE UP /HPF (ref 0–5)
GLUCOSE SERPL-MCNC: 137 MG/DL — HIGH (ref 70–99)
GLUCOSE UR QL: NEGATIVE — SIGNIFICANT CHANGE UP
HCT VFR BLD CALC: 36.3 % — SIGNIFICANT CHANGE UP (ref 34.5–45)
HGB BLD-MCNC: 12.2 G/DL — SIGNIFICANT CHANGE UP (ref 11.5–15.5)
IMM GRANULOCYTES NFR BLD AUTO: 0.4 % — SIGNIFICANT CHANGE UP (ref 0–1.5)
INR BLD: 1.18 — HIGH (ref 0.88–1.16)
KETONES UR-MCNC: NEGATIVE — SIGNIFICANT CHANGE UP
LACTATE SERPL-SCNC: 1.5 MMOL/L — SIGNIFICANT CHANGE UP (ref 0.5–2)
LEUKOCYTE ESTERASE UR-ACNC: NEGATIVE — SIGNIFICANT CHANGE UP
LYMPHOCYTES # BLD AUTO: 1.02 K/UL — SIGNIFICANT CHANGE UP (ref 1–3.3)
LYMPHOCYTES # BLD AUTO: 12.2 % — LOW (ref 13–44)
MCHC RBC-ENTMCNC: 28.4 PG — SIGNIFICANT CHANGE UP (ref 27–34)
MCHC RBC-ENTMCNC: 33.6 GM/DL — SIGNIFICANT CHANGE UP (ref 32–36)
MCV RBC AUTO: 84.6 FL — SIGNIFICANT CHANGE UP (ref 80–100)
MONOCYTES # BLD AUTO: 0.72 K/UL — SIGNIFICANT CHANGE UP (ref 0–0.9)
MONOCYTES NFR BLD AUTO: 8.6 % — SIGNIFICANT CHANGE UP (ref 2–14)
NEUTROPHILS # BLD AUTO: 6.46 K/UL — SIGNIFICANT CHANGE UP (ref 1.8–7.4)
NEUTROPHILS NFR BLD AUTO: 77.2 % — HIGH (ref 43–77)
NITRITE UR-MCNC: NEGATIVE — SIGNIFICANT CHANGE UP
NRBC # BLD: 0 /100 WBCS — SIGNIFICANT CHANGE UP (ref 0–0)
PH UR: 6 — SIGNIFICANT CHANGE UP (ref 5–8)
PLATELET # BLD AUTO: 213 K/UL — SIGNIFICANT CHANGE UP (ref 150–400)
POTASSIUM SERPL-MCNC: 3.6 MMOL/L — SIGNIFICANT CHANGE UP (ref 3.5–5.3)
POTASSIUM SERPL-SCNC: 3.6 MMOL/L — SIGNIFICANT CHANGE UP (ref 3.5–5.3)
PROT SERPL-MCNC: 6.6 G/DL — SIGNIFICANT CHANGE UP (ref 6–8.3)
PROT UR-MCNC: 30 MG/DL
PROTHROM AB SERPL-ACNC: 14.1 SEC — HIGH (ref 10.6–13.6)
RBC # BLD: 4.29 M/UL — SIGNIFICANT CHANGE UP (ref 3.8–5.2)
RBC # FLD: 14.2 % — SIGNIFICANT CHANGE UP (ref 10.3–14.5)
RBC CASTS # UR COMP ASSIST: < 5 /HPF — SIGNIFICANT CHANGE UP
SARS-COV-2 RNA SPEC QL NAA+PROBE: NEGATIVE — SIGNIFICANT CHANGE UP
SODIUM SERPL-SCNC: 139 MMOL/L — SIGNIFICANT CHANGE UP (ref 135–145)
SP GR SPEC: 1.01 — SIGNIFICANT CHANGE UP (ref 1–1.03)
UROBILINOGEN FLD QL: 0.2 E.U./DL — SIGNIFICANT CHANGE UP
WBC # BLD: 8.36 K/UL — SIGNIFICANT CHANGE UP (ref 3.8–10.5)
WBC # FLD AUTO: 8.36 K/UL — SIGNIFICANT CHANGE UP (ref 3.8–10.5)
WBC UR QL: < 5 /HPF — SIGNIFICANT CHANGE UP

## 2021-05-29 PROCEDURE — 99284 EMERGENCY DEPT VISIT MOD MDM: CPT | Mod: 25

## 2021-05-29 PROCEDURE — 83605 ASSAY OF LACTIC ACID: CPT

## 2021-05-29 PROCEDURE — 71046 X-RAY EXAM CHEST 2 VIEWS: CPT | Mod: 26

## 2021-05-29 PROCEDURE — 74177 CT ABD & PELVIS W/CONTRAST: CPT | Mod: 26,MA

## 2021-05-29 PROCEDURE — 99285 EMERGENCY DEPT VISIT HI MDM: CPT | Mod: 25

## 2021-05-29 PROCEDURE — 36415 COLL VENOUS BLD VENIPUNCTURE: CPT

## 2021-05-29 PROCEDURE — 71046 X-RAY EXAM CHEST 2 VIEWS: CPT

## 2021-05-29 PROCEDURE — 96374 THER/PROPH/DIAG INJ IV PUSH: CPT | Mod: XU

## 2021-05-29 PROCEDURE — 80053 COMPREHEN METABOLIC PANEL: CPT

## 2021-05-29 PROCEDURE — 74177 CT ABD & PELVIS W/CONTRAST: CPT

## 2021-05-29 PROCEDURE — 85025 COMPLETE CBC W/AUTO DIFF WBC: CPT

## 2021-05-29 PROCEDURE — 85610 PROTHROMBIN TIME: CPT

## 2021-05-29 PROCEDURE — 85730 THROMBOPLASTIN TIME PARTIAL: CPT

## 2021-05-29 PROCEDURE — 87086 URINE CULTURE/COLONY COUNT: CPT

## 2021-05-29 PROCEDURE — 87635 SARS-COV-2 COVID-19 AMP PRB: CPT

## 2021-05-29 PROCEDURE — 81001 URINALYSIS AUTO W/SCOPE: CPT

## 2021-05-29 RX ORDER — ONDANSETRON 8 MG/1
4 TABLET, FILM COATED ORAL ONCE
Refills: 0 | Status: COMPLETED | OUTPATIENT
Start: 2021-05-29 | End: 2021-05-29

## 2021-05-29 RX ORDER — CIPROFLOXACIN LACTATE 400MG/40ML
1 VIAL (ML) INTRAVENOUS
Qty: 14 | Refills: 0
Start: 2021-05-29 | End: 2021-06-04

## 2021-05-29 RX ORDER — METRONIDAZOLE 500 MG
1 TABLET ORAL
Qty: 14 | Refills: 0
Start: 2021-05-29 | End: 2021-06-04

## 2021-05-29 RX ORDER — IOHEXOL 300 MG/ML
30 INJECTION, SOLUTION INTRAVENOUS ONCE
Refills: 0 | Status: COMPLETED | OUTPATIENT
Start: 2021-05-29 | End: 2021-05-29

## 2021-05-29 RX ORDER — IBUPROFEN 200 MG
1 TABLET ORAL
Qty: 21 | Refills: 0
Start: 2021-05-29

## 2021-05-29 RX ADMIN — IOHEXOL 30 MILLILITER(S): 300 INJECTION, SOLUTION INTRAVENOUS at 06:30

## 2021-05-29 RX ADMIN — ONDANSETRON 4 MILLIGRAM(S): 8 TABLET, FILM COATED ORAL at 08:54

## 2021-05-29 RX ADMIN — ONDANSETRON 4 MILLIGRAM(S): 8 TABLET, FILM COATED ORAL at 06:28

## 2021-05-29 NOTE — ED PROVIDER NOTE - PROGRESS NOTE DETAILS
Ree: pt received from  night team at s/o; pt with fever and mild r sided abd tenderness, awaiting ct a/p. Dispo pending ct results and re-evaluation. CT shows mild colitis in right abdomen, pt reassessed, is comfortable, nontender on exam.  Labs with normal lactate, no leukocytosis, pt afebrile in ED.  Will recommend supportive care, oral hydration, bland diet, tylenol/ibuprofen PRN fever/pain.  Pt advised to f/u with pmd and GI, return to ED if sx worsen.

## 2021-05-29 NOTE — ED POST DISCHARGE NOTE - REASON FOR FOLLOW-UP
Other pt called back ED after being d/c, temp is 101.3, otherwise pt with no change in sx.  will rx cipro/flagyl, advised to return to ED if she is vomiting, if pain worsens

## 2021-05-29 NOTE — ED ADULT NURSE NOTE - CHIEF COMPLAINT QUOTE
Pt with hx of HTN, presents to ER c/o intermittent fever/chills, H/A and "achy legs" for 5 days. Pt reports last tylenol intake last night.

## 2021-05-29 NOTE — ED PROVIDER NOTE - NSFOLLOWUPINSTRUCTIONS_ED_ALL_ED_FT
Colitis    WHAT YOU NEED TO KNOW:    Colitis is swelling and irritation of your colon. Colitis may be caused by ulcers or a problem with your immune system. Bacteria, a virus, or a parasite may also cause colitis. The cause may not be known. You may have diarrhea, abdominal pain, fever, or blood or mucus in your bowel movement.    DISCHARGE INSTRUCTIONS:    Return to the emergency department if:   •You have sudden trouble breathing.      •Your bowel movements are black or have blood in them.      •You have blood in your vomit.      •You have severe abdominal pain or your abdomen is swollen and feels hard.      •You have any of the following signs of dehydration: ?Dizziness or weakness      ?Dry mouth, cracked lips, or severe thirst      ?Fast heartbeat or breathing      ?Urinating very little or not at all        Call your doctor if:   •Your symptoms get worse or do not go away.      •You have a fever, chills, cough, or feel weak and achy.      •You suddenly lose weight without trying.      •You have questions or concerns about your condition or care.      Medicines:   •Medicines may be given to decrease inflammation in your colon and treat diarrhea.      •Take your medicine as directed. Contact your healthcare provider if you think your medicine is not helping or if you have side effects. Tell him of her if you are allergic to any medicine. Keep a list of the medicines, vitamins, and herbs you take. Include the amounts, and when and why you take them. Bring the list or the pill bottles to follow-up visits. Carry your medicine list with you in case of an emergency.      Manage your symptoms:   •Drink liquids as directed to help prevent dehydration. Good liquids to drink include water, juice, and broth. Ask how much liquid to drink each day. You may need to drink an oral rehydration solution (ORS). An ORS contains a balance of water, salt, and sugar to replace body fluids lost during diarrhea.      •Eat a variety of healthy foods. Healthy foods include fruits, vegetables, whole-grain breads, beans, low-fat dairy products, lean meats, and fish. You may need to eat several small meals throughout the day instead of large meals. Avoid spicy foods, caffeine, chocolate, and foods high in fat.      •Talk to your healthcare provider before you take NSAIDs. NSAIDs can cause worsen your symptoms if ulcers are causing your colitis.      •Start to exercise when you feel better. Regular exercise helps your bowels work normally. Ask about the best exercise plan for you.      Prevent the spread of germs:          •Wash your hands often. Wash your hands several times each day. Wash after you use the bathroom, change a child's diaper, and before you prepare or eat food. Use soap and water every time. Rub your soapy hands together, lacing your fingers. Wash the front and back of your hands, and in between your fingers. Use the fingers of one hand to scrub under the fingernails of the other hand. Wash for at least 20 seconds. Rinse with warm, running water for several seconds. Then dry your hands with a clean towel or paper towel. Use hand  that contains alcohol if soap and water are not available. Do not touch your eyes, nose, or mouth without washing your hands first.  Handwashing           •Cover a sneeze or cough. Use a tissue that covers your mouth and nose. Throw the tissue away in a trash can right away. Use the bend of your arm if a tissue is not available. Wash your hands well with soap and water or use a hand .      •Clean surfaces often. Clean doorknobs, countertops, cell phones, and other surfaces that are touched often. Use a disinfecting wipe, a single-use sponge, or a cloth you can wash and reuse. Use disinfecting  if you do not have wipes. You can create a disinfecting  by mixing 1 part bleach with 10 parts water.      •Ask about vaccines you may need. Vaccines help prevent disease caused by some viruses and bacteria. Get the influenza (flu) vaccine as soon as recommended each year. The flu vaccine is usually available starting in September or October. Flu viruses change, so it is important to get a flu vaccine every year. Get the pneumonia vaccine if recommended. This vaccine is usually recommended every 5 years. Your provider will tell you when to get this vaccine, if needed. Your healthcare provider can tell you if you should get other vaccines, and when to get them.      Follow up with your doctor as directed: You may need to return for a colonoscopy or other tests. Write down how often you have a bowel movements and what they look like. Bring this to your follow-up visits. Write down your questions so you remember to ask them during your visits.

## 2021-05-29 NOTE — ED PROVIDER NOTE - CLINICAL SUMMARY MEDICAL DECISION MAKING FREE TEXT BOX
will r/o appendicitis w/ CT scan w/ po and IV contrast, check UA for UTI. if CT neg for acute process, can likely be dc home

## 2021-05-29 NOTE — ED PROVIDER NOTE - PHYSICAL EXAMINATION
CONSTITUTIONAL: Well-appearing; well-nourished; in no apparent distress.   HEAD: Normocephalic; atraumatic.   EYES:  conjunctiva and sclera clear  ENT: normal nose; no rhinorrhea; normal pharynx with no erythema or lesions.   NECK: Supple; non-tender;   CARDIOVASCULAR: Normal S1, S2; no murmurs, rubs, or gallops. Regular rate and rhythm.   RESPIRATORY: Breathing easily; breath sounds clear and equal bilaterally; no wheezes, rhonchi, or rales.  GI: Soft; non-distended; mild R CVA, RUQ tenderness, moderate tenderness to palpation in RLQ  EXT: No cyanosis or edema; N/V intact  SKIN: Normal for age and race; warm; dry; good turgor; no apparent lesions or rash.   NEURO: A & O x 3; face symmetric; grossly unremarkable.   PSYCHOLOGICAL: The patient’s mood and manner are appropriate.

## 2021-05-29 NOTE — ED ADULT TRIAGE NOTE - ARRIVAL INFO ADDITIONAL COMMENTS
Pt receive Ph[employer] vaccine, last covid test negative on Wednesday. Pt report going to urgent care yesterday and instructed to come to ER. Was unable d/t grandchild care issues.

## 2021-05-29 NOTE — ED ADULT NURSE NOTE - OBJECTIVE STATEMENT
70F w/ hx of prior left cerebellar stroke, HTN, HLD, TIA, right macular degeneration who presented to the ED from home with complaint of fevers x 5 days, and some mild R sided abdominal pain. Finished covid vaccination end of April. Had covid-19 swab done 2 days ago and it came back negative. Denies surgical hx. never had this before. no sob, cp, cough, uri symptoms. no urinary complaints, N/V/D.

## 2021-05-29 NOTE — ED ADULT TRIAGE NOTE - BSA (M2)
7/3/2020 [facility] [address] [id number] Dear Ms. Sanket Hoskins, You were recently seen in the Emergency Department of [place] [address]. and had lab work performed. We would like to discuss these results with you. Please call the Emergency Department at your earliest convenience at [phone] between 10am-8pm to speak with one of our providers. Sincerely, ESSENCE Piña 
 
 
[facility] EMERGENCY DEPT 
[address] [place] [address] 83. 
[phone] Option #3 1.96

## 2021-05-29 NOTE — ED PROVIDER NOTE - OBJECTIVE STATEMENT
70F w/ hx of prior left cerebellar stroke, HTN, HLD, TIA, right macular degeneration who presented to the ED from home with complaint of fevers x 5 days, and some mild R sided abdominal pain. Finished covid vaccination end of April. Had covid-19 swab done 2 days ago and it came back negative. Denies surgical hx. never had this before. no sob, cp, cough, uri symptoms. no urinary complaints

## 2021-05-29 NOTE — ED PROVIDER NOTE - PATIENT PORTAL LINK FT
You can access the FollowMyHealth Patient Portal offered by [facility] by registering at the following website: [email or web address]. By joining [facility]’s FollowMyHealth portal, you will also be able to view your health information using other applications (apps) compatible with our system.

## 2021-05-30 ENCOUNTER — TRANSCRIPTION ENCOUNTER (OUTPATIENT)
Age: 71
End: 2021-05-30

## 2021-05-30 LAB
CULTURE RESULTS: SIGNIFICANT CHANGE UP
SPECIMEN SOURCE: SIGNIFICANT CHANGE UP

## 2021-06-07 ENCOUNTER — RX RENEWAL (OUTPATIENT)
Age: 71
End: 2021-06-07

## 2021-06-15 ENCOUNTER — RX RENEWAL (OUTPATIENT)
Age: 71
End: 2021-06-15

## 2021-06-17 ENCOUNTER — APPOINTMENT (OUTPATIENT)
Dept: PULMONOLOGY | Facility: CLINIC | Age: 71
End: 2021-06-17
Payer: MEDICARE

## 2021-06-17 PROCEDURE — 99214 OFFICE O/P EST MOD 30 MIN: CPT | Mod: 95

## 2021-06-22 NOTE — ASSESSMENT
[FreeTextEntry1] : 21:\par It was a pleasure to see Rashmi in follow-up today via telehealth.    Her respiratory issues are as follows:\par \par 1.   Interstitial lung abnormality (ISAAC) \par Rashmi had a CT chest as part of the lung cancer screening program on 19; it was her first dedicated chest CT.  It demonstrated peripheral reticular and groundglass opacities, particularly posteriorly. There were also some scattered TIB micronodules and a 9 mm  cyst in the lingula. While the posterior predilection of the groundglass opacities could have pointed toward dependent atelectasis, her repeat LCS CT on 20 demonstrated that the abnormalities were still present in the prone position. Findings are still predominantly groundglass, more prominent on the right side. She fulfills the definition of ISAAC as it was an incidental finding, she is not symptomatic. In lung cancer screened patients, ISAAC appears to be age related and is seen in up to 10% of patients\par In this patient, ISAAC features that portend good prognosis include predominantly ground glass opacities and absence of architectural distortion or traction bronchiectasis. Features that predict progression include subpleural location   \par \par -At a prior visit, we checked HECTOR (negative), CH50 (WNL), and RF (18). We rechecked autoimmune serologies on 21 showing normal RF, elevated CH50, elevated HECTOR (1:320, homogenous).\par -PFTs on 19 were normal and showed no evidence of obstruction, DLCO is normal (82%). Her last 6MWT was a relatively normal test. She had repeat PFT on 3/4/21 showing stable FVC and a mild decrease in FEV1 since 2019. Of note, there has been about a 10% decrease in TLC and over 15% decrease in DLCO. We had Rashmi repeat PFT again on 21. Since then, her DLCO has improved slightly 11.0 (52%) -> 12.55 (61%). Everything else is stable.\par -We reviewed her chest CT from 2021, which was stable. There is a predominance of ground glass opacities. The CT picture is indeterminate for IPF.\par -Echocardiogram done 3/10/21 showed normal pulmonary pressures, RVSP 27 mmHg\par \par Below is a comparison of PFT done 19, 10/3/19, 3/4/21 and 21\par FVC: 2.72 L (92%) -> 2.84 L (96%) -> 2.64 L (82%) -> 2.61 L (87%)\par FEV1: 2.38 L (101%) -> 2.23 L (95%) -> 2.12 L (87%) -> 2.11 L (91%)\par FEV1/FVC: 87% -> 79% -> 80% -> 81%\par [id number]%: 3.20 L/s (133%) -> 2.12 L/s (88%) -> 2.22 L/s (112%)\par T.23 L (84%)-> 3.86 L (74%) -> 3.81 L (72%)\par RV/T% -> 31% -> 32%\par DLCO: 15.0 (69%) -> DLCO: 17.7 (82%) -> 11.0 (52%) -> 12.55 (61%). \par \par Plan:\par - We will continue to follow CT scans every 6 months to evaluate for progression of interstitial lung abnormality. She will be due for her next CT in 2021\par -We will repeat PFT and 6MWT every 6 months for the first 2 years. We will look to see if there is a drop in FVC and TLC drop > than 10% or a drop in diffusion capacity > than 15%. Based on PFT and 6MWT, we will discuss if there is a need for treatment with anti fibrotics (pirfenidone, nintedanib)\par \par 2.    Smoking related lung disease\par Another possibility to explain the bilateral  ground glass opacities  is  Desquamative interstitial  pneumonia or RB-ILD.\par We have asked the patient  to quit smoking. We will  follow up the patient in 6 months to see if the opacities have diminished significantly.\par \par 3. Active smoking\par We had a detailed discussion about the effects of smoking on ISAAC, including its development and progression.  We discussed a study in CHEST that the incidence of lung cancer in the setting of ISAAC may be higher. She restarted smoking during the pandemic, 1/2 pack per day. She stopped smoking in January.  Today she reports she stopped smoking 8 weeks ago. She has also been provided smoking cessation counseling by Lala Epperson DNP and was referred to the [facility] for Tobacco Control program.\par \par 4. Pulmonary nodule\par Right upper lobe nodule is stable, 5 mm. No significant change in size or morphology on recent CT done 2021. There is a calcified granuloma in the left upper lobe that does not require follow up. There are a few other micronodules seen bilaterally.\par \par Plan:\par - As per the Fleischner guidelines, she should have annual CT follow-up for the RUL nodule. She will have a CT for the interstitial lung abnormality in July/August. If this CT is stable, she can continue in the annual lung cancer screening program.\par \par 5.  ARNOL\par Risk factors included Mallampati score of IV and possible snoring.  Diagnosed with ARNOL but had difficulty tolerating CPAP.  She is followed by Dr. Streeter. We recommended she make a follow up appointment with Dr. Streeter so she can restart using her CPAP.\par \par 6. Cystic lesions\par There is a cyst seen in the lung. looking for nay increase in size or number of cystic lesions. These cysts can be seen with Sjogren's syndrome. She denies eye dryness. Generally she does not have dry mouth.\par \par Plan:\par - We will repeat autoimmune serologies, RF, HECTOR, SSA ab, SSB ab when she comes in for CT scan\par \par Return to clinic: 4 months

## 2021-06-22 NOTE — HISTORY OF PRESENT ILLNESS
[Home] : at home, [unfilled] , at the time of the visit. [Medical Office: ([facility])___] : at the medical office located in  [Verbal consent obtained from patient] : the patient, [unfilled] [TextBox_4] : Pt is 69 yo F with PMH TIA, left vertebral dissection, HTN, stable pre-diabetes, left ICA stenosis, GERD, duodenal ulcer, arthritis.\par 37 pack year history of smoking, still currently smoking. \par \par First visit 2/19/19: Has tried patches and lozenges in the past. Wants to start Chantix, was prescribed by Dr. Jacob last week but she states she couldn't afford the co-pay. \par Last cigarette was earlier today. Smoking roughly 10 cig/week. \par \par Has mild cough, cough is dry. \par Denies chest tightness, wheezing, recurrent respiratory tract infections. \par No ED visits or hospitalizations for breathing. \par No diagnosis of asthma or PNA in the past. No personal history of blood clots or cancers. \par Currently no pets. Had a parakeet for 8 years, 25 years ago. \par She has down throw pillows. She has a 10-year old carpet in her bedroom. \par No mold in the homes.\par She had a home in [place] [id number] where radon levels were borderline elevated.\par No other inhalational exposures she is aware of. \par She has a history of snoring.\par \par FHx: sister breast CA (diagnosed around age 65 yo)\par PCN: penicillin (angioedema)\par ****\par \par 6-17-21:\par she stopped smoking 8 weeks ago\par she was recently in the ER after having a fever x 5 days. She had an abdominal CT showing some inflammation. COVID swabs were negative

## 2021-06-22 NOTE — PROCEDURE
[FreeTextEntry1] : PFT 21\par FVC: 2.61 L (87%)\par FEV1: 2.11 L (91%)\par FEV1/FVC: 81%\par FEF 25-75%: 2.46 L/s (129%)\par TLC: 3.81 L (72%)\par RV/T%\par DLCO: 12.55 (61%)\par Normal spirometry.\par mildly decreased lung volumes\par mildly decreased diffusion capacity\par \par PFT 3/4/21\par FVC: 2.64 L (82%)\par FEV1: 2.12 L (87%)\par FEV1/FVC: 80%\par FEF 25-75%: 2.22 L/s (112%)\par TLC:3.86 L (74%)\par RV/T%\par DLCO: 11.0 (52%)\par Normal spirometry\par mildly decreased lung volume\par moderate diffusion capacity\par \par 6MWT 3/4/21\par Patient walked 487 meters during a 6 minute walk test.\par Resting O2 saturation was 96% on RA. Heart rate 64 bpm.\par End test O2 saturation was 96% on RA. Heart rate 81 bpm. Ulises scale end of test - 1 "very slight"\par This signifies normal walk distance and no desaturation\par \par CT chest 2021:\par Impression:\par 1. 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 5 mm solid nodule right upper lobe-stable.\par \par EXAM: CT LDCT LUNG CA SCRN ANNUAL PROCEDURE DATE: 2020 \par Additional imaging was performed in the prone position complete evaluation of the dependent regions of the lungs. \par Lungs and airways: Image numbers for description of nodule location refer to thin section axial series number 4. \par 1.1 cm air cyst within the anterior segment of the right upper lobe (image 155). There are scattered solid subcentimeter and micronodules as well as benign perifissural lymph nodes. The largest solid nodule measures 5 mm, is noted within the right upper lobe laterally (image 165). There is a peripheral predominantly lower lung zone reticular and groundglass pattern which persists on prone imaging. There may be some subpleural sparing. There is temporal homogeneity. There is no evidence of pulmonary fibrosis. These constellations of findings are suggestive of NSIP. \par Trachea and central bronchi patent. \par Pleura: The pleural spaces are clear. \par Base of neck, mediastinum and heart: The thyroid gland is normal. No mediastinal, hilar or axillary lymphadenopathy is seen. The heart and pericardium are within normal limits. Mild calcification of the aortic valve. Small calcified atheromatous plaque formation is noted throughout the thoracic aorta and proximal abdominal aorta and side branches. \par Coronary artery calcification: None \par Soft tissues: Normal. \par Abdomen: Small type I hiatal hernia. \par Bones: Mild to moderate multilevel degenerative disc disease. Small benign-appearing bone island involving T5 \par Impression: \par 1. Peripheral reticular and groundglass lower lung zone pattern with temporal homogeneity which persists on prone imaging. There may be some mild subpleural sparing. There is no pulmonary fibrosis. 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 Abbreviated differential includes LIP and DIP. \par 2. Scattered solid subcentimeter and micronodules measuring up to 5 mm within the right upper lobe. \par Lung-RADS category: 2S - Benign appearance or behavior. Nodules with very low likelihood of becoming a clinically active cancer due to size or lack of growth. Probability of malignancy < 1%. \par \par Spirometry, DLCO 10/3/19:\par FVC: 2.84 L (96%)\par FEV1: 2.23 L (95%)\par FEV1/FVC: 79%\par AGS00-68%: 2.12 L/s (88%)\par DLCO: 17.7 (82%)\par Normal spirometry. Normal diffusion capacity.\par \par CPAP titration 19: recommended 10 cm H2O, autoPAP was ordered\par PSG 3/13/19: AHI CMS 32, min SpO2 79%, time <= 88% 1.2 min\par \par PFT, 6MWT 19:\par FVC: 2.72 L (92%)\par FEV1: 2.38 L (101%)\par FEV1/FVC: 87%\par NHE58-94%: 3.20 L/s (133%)\par T.23 L (84%)\par RV/T%\par DLCO: 15.0 (69%)\par 6MWT: 439 meters, SpO2 start: 97% --> SpO2 end: 95%\par Normal spirometry. Lung volumes normal. Mildly reduced diffusion capacity. Normal walk distance with no desaturation. \par \par EXAM: CT LDCT LUNG CA SCRN BASELINE \par *** ADDENDUM 2019 *** \par Addendum: \par Abbreviated differential for these peripheral groundglass opacities in the setting of smoking must extend to include DIP. Bronchoscopic correlation and additional HRCT imaging to include end inspiration/end expiration is suggested. \par *** END OF ADDENDUM 2019 *** \par \par PROCEDURE DATE: 2019 \par INTERPRETATION: CT scan of the chest without intravenous contrast, using low-dose lung cancer screening protocol \par History: 65-year-old female nonsmoker with a 37.5 pack year history of smoking \par Comparison: None. \par Findings: \par Lungs and airways: Image numbers for description of nodule location refer to thin section axial series number 4. Scattered calcified granulomata. 4 mm nodule within the right upper lobe (image 166). Is a mild peripheral reticular and groundglass opacities which demonstrates temporal homogeneity with a suggestion of some subpleural sparing involving the upper and lower lung zones slightly more pronounced inferiorly. There are some scattered tree-in-bud centrilobular micronodules. No significant air-trapping is noted. 9 mm air cyst is noted within the lingula (image 167) \par Trachea and central bronchi patent. \par Pleura: The pleural spaces are clear. \par Base of neck, mediastinum and heart: The thyroid gland demonstrates coarse parenchymal calcifications. No mediastinal, hilar or axillary lymphadenopathy is seen. The heart and pericardium are within normal limits. \par Mild calcified atheromatous plaque formation involving the thoracic aorta most pronounced in the arch and proximal abdominal regions \par Coronary artery calcification: None \par Soft tissues: Normal. \par Abdomen: This study was performed without contrast and with lower than standard dose. These factors reduce sensitivity for detection of small lesions in the upper abdomen. Given these technical limitations, no focal lesion is seen within the visualized organs. \par Bones: Mild age-appropriate degenerative change. Small sclerotic focus involving the T5 vertebral body compatible with a probable bone island. \par Impression: \par 1. Scattered tree-in-bud centrilobular micronodules with mild small airway inflammation. No significant air-trapping is identified. \par 2. Mild peripheral reticular and groundglass opacities involving all 5 pulmonary lobes with a slightly lower lung zone predilection. There is relative temporal homogeneity with possibly some subpleural sparing. No \par pulmonary fibrosis. Abbreviated differential includes an NSIP pattern. \par Respiratory bronchiolitis although centrilobular groundglass nodules are not well-visualized, and LIP cannot be excluded. Further imaging to include HRCT to be performed at end inspiration/end expiration is suggested for more complete evaluation \par 3. 4 mm nodule within the right upper lobe. Scattered calcified granulomata. \par Lung-RADS category: 2S - Benign appearance or behavior. Nodules with very low likelihood of becoming a clinically active cancer due to size or lack of growth. Probability of malignancy < 1%. \par Recommendation: \par 2S - Continue annual screening with LDCT in 12 months.

## 2021-06-29 ENCOUNTER — APPOINTMENT (OUTPATIENT)
Dept: INTERNAL MEDICINE | Facility: CLINIC | Age: 71
End: 2021-06-29

## 2021-08-08 ENCOUNTER — RX RENEWAL (OUTPATIENT)
Age: 71
End: 2021-08-08

## 2021-08-12 ENCOUNTER — NON-APPOINTMENT (OUTPATIENT)
Age: 71
End: 2021-08-12

## 2021-08-12 ENCOUNTER — APPOINTMENT (OUTPATIENT)
Dept: CT IMAGING | Facility: HOSPITAL | Age: 71
End: 2021-08-12
Payer: MEDICARE

## 2021-08-12 ENCOUNTER — OUTPATIENT (OUTPATIENT)
Dept: OUTPATIENT SERVICES | Facility: HOSPITAL | Age: 71
LOS: 1 days | End: 2021-08-12
Payer: MEDICARE

## 2021-08-12 PROCEDURE — 71250 CT THORAX DX C-: CPT

## 2021-08-12 PROCEDURE — 71250 CT THORAX DX C-: CPT | Mod: 26

## 2021-08-30 ENCOUNTER — RX RENEWAL (OUTPATIENT)
Age: 71
End: 2021-08-30

## 2021-09-03 ENCOUNTER — NON-APPOINTMENT (OUTPATIENT)
Age: 71
End: 2021-09-03

## 2021-09-14 ENCOUNTER — APPOINTMENT (OUTPATIENT)
Dept: NEUROLOGY | Facility: CLINIC | Age: 71
End: 2021-09-14
Payer: MEDICARE

## 2021-09-14 ENCOUNTER — NON-APPOINTMENT (OUTPATIENT)
Age: 71
End: 2021-09-14

## 2021-09-14 VITALS
DIASTOLIC BLOOD PRESSURE: 81 MMHG | SYSTOLIC BLOOD PRESSURE: 115 MMHG | TEMPERATURE: 97.7 F | HEART RATE: 55 BPM | HEIGHT: 66 IN | OXYGEN SATURATION: 95 %

## 2021-09-14 DIAGNOSIS — I65.02 OCCLUSION AND STENOSIS OF LEFT VERTEBRAL ARTERY: ICD-10-CM

## 2021-09-14 LAB — RHEUMATOID FACT SER QL: 11 IU/ML

## 2021-09-14 PROCEDURE — 99215 OFFICE O/P EST HI 40 MIN: CPT

## 2021-09-14 RX ORDER — ATORVASTATIN CALCIUM 80 MG/1
80 TABLET, FILM COATED ORAL DAILY
Qty: 90 | Refills: 0 | Status: DISCONTINUED | COMMUNITY
Start: 2017-11-28 | End: 2021-09-14

## 2021-09-14 RX ORDER — FLUOXETINE HYDROCHLORIDE 20 MG/1
20 CAPSULE ORAL
Qty: 90 | Refills: 0 | Status: DISCONTINUED | COMMUNITY
Start: 2019-08-23 | End: 2021-09-14

## 2021-09-14 RX ORDER — BUSPIRONE HYDROCHLORIDE 5 MG/1
5 TABLET ORAL
Qty: 180 | Refills: 0 | Status: DISCONTINUED | COMMUNITY
Start: 2021-01-15 | End: 2021-09-14

## 2021-09-14 NOTE — PHYSICAL EXAM
[FreeTextEntry1] : Alert.  Fully oriented.  Speech and language are intact.  Cranial nerves II-XII are intact.  Motor exam reveals intact strength with individual muscle testing in bilateral upper and lower extremities.  Tone is normal.  Sensation is intact to light touch in distal extremities.  Finger-to-nose is slightly dysmetric on the left; heel-to-shin intact.  Gait is normal.\par

## 2021-09-14 NOTE — HISTORY OF PRESENT ILLNESS
[FreeTextEntry1] : The patient is a very pleasant right-handed 70-year-old female with a history of hypertension, hyperlipidemia, tobacco use, ARNOL for which she is not using CPAP, and prior left cerebellar stroke without residual deficits secondary to left vertebral artery dissection for which she is on aspirin 81 mg and atorvastatin 80.  She is a prior patient of Dr. Bowman and is here for her annual follow-up.\par \par The patient states in January 2021 she experienced sudden gait instability in which she was listing to the right.  Symptoms only lasted a few minutes before entirely resolving.  They reminded her of how she presented in 2016 when she had the cerebellar stroke/vertebral artery dissection.  She was seen in her emergency department.  Head CT showed no acute findings.  CTA showed moderate to severe stenosis secondary to mixed plaque of the right supraclinoid ICA, mixed plaque without significant stenosis of the left proximal ICA, as well as multifocal moderate stenosis throughout the extracranial left vertebral artery with severe stenosis/occlusion of the intracranial portion.  She was dismissed with a diagnosis of vertigo.  Her Plavix was transitioned to aspirin 81 mg and she was continued on atorvastatin 80 mg.  She has not had any recurrent symptoms worrisome for TIA/stroke since.\par \par Otherwise, the patient follows closely with her PCP.  She is due for repeat lipid panel, A1c testing but has not had this yet.  She continues to smoke 1 pack every 3 days or so.  She plans to quit October 03.  She also has sleep apnea but has not been using her CPAP device.

## 2021-09-14 NOTE — ASSESSMENT
[FreeTextEntry1] : The patient is a very pleasant 70-year-old female with a history of asymptomatic left cerebellar infarct in 2016 secondary to a left vertebral artery dissection presenting for follow-up.  Episode in January 2021 was thought possibly consistent with peripheral vertigo after assessment by stroke neurology.  She has not had any further episodes concerning for TIA symptoms.  Because of this, we will not pursue additional investigations such as MRI at this point.  However, should she have recurrent episodes, certainly she will need further evaluation.  For now, she should continue on aspirin and high intensity statin.  I have again encouraged her to stop smoking, use her CPAP device, and engage in regular physical activity.  She will continue to follow with her PCP and cardiology closely.  She knows to request repeat lipid panel, A1c during her next lab draw to ensure risk factors are well addressed.  Plan for follow-up in 1 year.

## 2021-09-15 ENCOUNTER — NON-APPOINTMENT (OUTPATIENT)
Age: 71
End: 2021-09-15

## 2021-09-15 LAB
ENA RNP AB SER IA-ACNC: <0.2 AL
ENA SM AB SER IA-ACNC: <0.2 AL
ENA SS-A AB SER IA-ACNC: <0.2 AL
ENA SS-B AB SER IA-ACNC: 0.2 AL

## 2021-09-16 ENCOUNTER — NON-APPOINTMENT (OUTPATIENT)
Age: 71
End: 2021-09-16

## 2021-09-16 LAB
ANA PAT FLD IF-IMP: ABNORMAL
ANA SER IF-ACNC: ABNORMAL

## 2021-10-06 ENCOUNTER — NON-APPOINTMENT (OUTPATIENT)
Age: 71
End: 2021-10-06

## 2021-10-06 DIAGNOSIS — S09.90XA UNSPECIFIED INJURY OF HEAD, INITIAL ENCOUNTER: ICD-10-CM

## 2021-10-09 ENCOUNTER — OUTPATIENT (OUTPATIENT)
Dept: OUTPATIENT SERVICES | Facility: HOSPITAL | Age: 71
LOS: 1 days | End: 2021-10-09
Payer: MEDICARE

## 2021-10-09 ENCOUNTER — APPOINTMENT (OUTPATIENT)
Dept: CT IMAGING | Facility: HOSPITAL | Age: 71
End: 2021-10-09

## 2021-10-09 PROCEDURE — 70450 CT HEAD/BRAIN W/O DYE: CPT | Mod: 26

## 2021-10-09 PROCEDURE — 70450 CT HEAD/BRAIN W/O DYE: CPT

## 2021-10-11 ENCOUNTER — TRANSCRIPTION ENCOUNTER (OUTPATIENT)
Age: 71
End: 2021-10-11

## 2021-10-11 ENCOUNTER — NON-APPOINTMENT (OUTPATIENT)
Age: 71
End: 2021-10-11

## 2021-10-11 LAB — SARS-COV-2 N GENE NPH QL NAA+PROBE: NOT DETECTED

## 2021-10-13 ENCOUNTER — APPOINTMENT (OUTPATIENT)
Dept: PULMONOLOGY | Facility: CLINIC | Age: 71
End: 2021-10-13
Payer: MEDICARE

## 2021-10-13 ENCOUNTER — NON-APPOINTMENT (OUTPATIENT)
Age: 71
End: 2021-10-13

## 2021-10-13 PROCEDURE — 94729 DIFFUSING CAPACITY: CPT

## 2021-10-13 PROCEDURE — 94727 GAS DIL/WSHOT DETER LNG VOL: CPT

## 2021-10-13 PROCEDURE — 94060 EVALUATION OF WHEEZING: CPT

## 2021-10-13 PROCEDURE — 94618 PULMONARY STRESS TESTING: CPT

## 2021-10-14 ENCOUNTER — APPOINTMENT (OUTPATIENT)
Dept: HEART AND VASCULAR | Facility: CLINIC | Age: 71
End: 2021-10-14
Payer: MEDICARE

## 2021-10-14 ENCOUNTER — NON-APPOINTMENT (OUTPATIENT)
Age: 71
End: 2021-10-14

## 2021-10-14 VITALS
WEIGHT: 188 LBS | HEART RATE: 64 BPM | SYSTOLIC BLOOD PRESSURE: 129 MMHG | OXYGEN SATURATION: 98 % | TEMPERATURE: 96.8 F | HEIGHT: 66 IN | DIASTOLIC BLOOD PRESSURE: 68 MMHG | BODY MASS INDEX: 30.22 KG/M2

## 2021-10-14 PROCEDURE — 93000 ELECTROCARDIOGRAM COMPLETE: CPT

## 2021-10-14 PROCEDURE — 99214 OFFICE O/P EST MOD 30 MIN: CPT

## 2021-10-14 PROCEDURE — 36415 COLL VENOUS BLD VENIPUNCTURE: CPT

## 2021-10-14 NOTE — ASSESSMENT
[FreeTextEntry1] : htn no diuretics may have created LVOT obstruction \par carotid stenosis on atorvastatin per neuro now on aspirin no plavix \par smoking cessation started smoking may have ILD seeing pulmonary\par asymetric septal hypertrophy no outflow gradient continue current meds hydration no further work up\par fu in 6 months

## 2021-10-14 NOTE — PHYSICAL EXAM
[General Appearance - Well Developed] : well developed [Normal Appearance] : normal appearance [Well Groomed] : well groomed [General Appearance - Well Nourished] : well nourished [No Deformities] : no deformities [General Appearance - In No Acute Distress] : no acute distress [Eyelids - No Xanthelasma] : the eyelids demonstrated no xanthelasmas [Normal Oral Mucosa] : normal oral mucosa [No Oral Pallor] : no oral pallor [No Oral Cyanosis] : no oral cyanosis [Normal Jugular Venous A Waves Present] : normal jugular venous A waves present [Normal Jugular Venous V Waves Present] : normal jugular venous V waves present [No Jugular Venous Burks A Waves] : no jugular venous burks A waves [Respiration, Rhythm And Depth] : normal respiratory rhythm and effort [Exaggerated Use Of Accessory Muscles For Inspiration] : no accessory muscle use [Auscultation Breath Sounds / Voice Sounds] : lungs were clear to auscultation bilaterally [Heart Rate And Rhythm] : heart rate and rhythm were normal [Heart Sounds] : normal S1 and S2 [Murmurs] : no murmurs present [Abdomen Soft] : soft [Abdomen Tenderness] : non-tender [Abdomen Mass (___ Cm)] : no abdominal mass palpated [Abnormal Walk] : normal gait [Gait - Sufficient For Exercise Testing] : the gait was sufficient for exercise testing [Nail Clubbing] : no clubbing of the fingernails [Cyanosis, Localized] : no localized cyanosis [Petechial Hemorrhages (___cm)] : no petechial hemorrhages [Skin Color & Pigmentation] : normal skin color and pigmentation [] : no rash [No Venous Stasis] : no venous stasis [Skin Lesions] : no skin lesions [No Skin Ulcers] : no skin ulcer [No Xanthoma] : no  xanthoma was observed [Oriented To Time, Place, And Person] : oriented to person, place, and time [Affect] : the affect was normal [Mood] : the mood was normal [No Anxiety] : not feeling anxious [Well Developed] : well developed [Well Nourished] : well nourished [No Acute Distress] : no acute distress [Normal Conjunctiva] : normal conjunctiva [Normal Venous Pressure] : normal venous pressure [No Carotid Bruit] : no carotid bruit [Normal S1, S2] : normal S1, S2 [No Murmur] : no murmur [No Rub] : no rub [No Gallop] : no gallop [Clear Lung Fields] : clear lung fields [Good Air Entry] : good air entry [No Respiratory Distress] : no respiratory distress  [Soft] : abdomen soft [Non Tender] : non-tender [No Masses/organomegaly] : no masses/organomegaly [Normal Bowel Sounds] : normal bowel sounds [Normal Gait] : normal gait [No Edema] : no edema [No Cyanosis] : no cyanosis [No Clubbing] : no clubbing [No Varicosities] : no varicosities [No Rash] : no rash [No Skin Lesions] : no skin lesions [Moves all extremities] : moves all extremities [No Focal Deficits] : no focal deficits [Normal Speech] : normal speech [Alert and Oriented] : alert and oriented [Normal memory] : normal memory [FreeTextEntry1] : systolic murmur

## 2021-10-15 ENCOUNTER — NON-APPOINTMENT (OUTPATIENT)
Age: 71
End: 2021-10-15

## 2021-10-15 DIAGNOSIS — Z11.59 ENCOUNTER FOR SCREENING FOR OTHER VIRAL DISEASES: ICD-10-CM

## 2021-10-15 LAB
ALBUMIN SERPL ELPH-MCNC: 4.4 G/DL
ALP BLD-CCNC: 84 U/L
ALT SERPL-CCNC: 15 U/L
ANION GAP SERPL CALC-SCNC: 10 MMOL/L
AST SERPL-CCNC: 14 U/L
BILIRUB SERPL-MCNC: 0.9 MG/DL
BUN SERPL-MCNC: 14 MG/DL
CALCIUM SERPL-MCNC: 9.7 MG/DL
CHLORIDE SERPL-SCNC: 100 MMOL/L
CHOLEST SERPL-MCNC: 130 MG/DL
CO2 SERPL-SCNC: 28 MMOL/L
CREAT SERPL-MCNC: 0.6 MG/DL
ESTIMATED AVERAGE GLUCOSE: 114 MG/DL
GLUCOSE SERPL-MCNC: 131 MG/DL
HBA1C MFR BLD HPLC: 5.6 %
HDLC SERPL-MCNC: 46 MG/DL
LDLC SERPL CALC-MCNC: 64 MG/DL
NONHDLC SERPL-MCNC: 84 MG/DL
POTASSIUM SERPL-SCNC: 4.4 MMOL/L
PROT SERPL-MCNC: 6.6 G/DL
SODIUM SERPL-SCNC: 139 MMOL/L
TRIGL SERPL-MCNC: 99 MG/DL

## 2021-11-18 ENCOUNTER — APPOINTMENT (OUTPATIENT)
Dept: RHEUMATOLOGY | Facility: CLINIC | Age: 71
End: 2021-11-18
Payer: MEDICARE

## 2021-11-18 VITALS
DIASTOLIC BLOOD PRESSURE: 56 MMHG | HEART RATE: 55 BPM | OXYGEN SATURATION: 95 % | HEIGHT: 66 IN | BODY MASS INDEX: 30.37 KG/M2 | TEMPERATURE: 97.8 F | WEIGHT: 189 LBS | SYSTOLIC BLOOD PRESSURE: 116 MMHG

## 2021-11-18 DIAGNOSIS — M47.27 OTHER SPONDYLOSIS WITH RADICULOPATHY, LUMBOSACRAL REGION: ICD-10-CM

## 2021-11-18 DIAGNOSIS — R76.8 OTHER SPECIFIED ABNORMAL IMMUNOLOGICAL FINDINGS IN SERUM: ICD-10-CM

## 2021-11-18 PROCEDURE — 99205 OFFICE O/P NEW HI 60 MIN: CPT | Mod: 25

## 2021-11-18 PROCEDURE — 36415 COLL VENOUS BLD VENIPUNCTURE: CPT

## 2021-11-18 NOTE — PHYSICAL EXAM
[General Appearance - Alert] : alert [General Appearance - In No Acute Distress] : in no acute distress [General Appearance - Well Nourished] : well nourished [General Appearance - Well Developed] : well developed [General Appearance - Well-Appearing] : healthy appearing [Sclera] : the sclera and conjunctiva were normal [Outer Ear] : the ears and nose were normal in appearance [Examination Of The Oral Cavity] : the lips and gums were normal [Oropharynx] : the oropharynx was normal [Neck Appearance] : the appearance of the neck was normal [Neck Cervical Mass (___cm)] : no neck mass was observed [Jugular Venous Distention Increased] : there was no jugular-venous distention [Thyroid Diffuse Enlargement] : the thyroid was not enlarged [] : no respiratory distress [Respiration, Rhythm And Depth] : normal respiratory rhythm and effort [Auscultation Breath Sounds / Voice Sounds] : lungs were clear to auscultation bilaterally [Heart Rate And Rhythm] : heart rate was normal and rhythm regular [Heart Sounds] : normal S1 and S2 [Murmurs] : no murmurs [Edema] : there was no peripheral edema [Veins - Varicosity Changes] : there were no varicosital changes [Abdomen Soft] : soft [Abdomen Tenderness] : non-tender [Cervical Lymph Nodes Enlarged Posterior Bilaterally] : posterior cervical [Cervical Lymph Nodes Enlarged Anterior Bilaterally] : anterior cervical [No CVA Tenderness] : no ~M costovertebral angle tenderness [No Spinal Tenderness] : no spinal tenderness [Oriented To Time, Place, And Person] : oriented to person, place, and time [Impaired Insight] : insight and judgment were intact [Abnormal Walk] : normal gait [Nail Clubbing] : no clubbing  or cyanosis of the fingernails [Musculoskeletal - Swelling] : no joint swelling seen [Motor Tone] : muscle strength and tone were normal [FreeTextEntry1] : skin lesions

## 2021-11-18 NOTE — HISTORY OF PRESENT ILLNESS
[Depression] : depression [Arthralgias] : arthralgias [Dry Eyes] : dry eyes [FreeTextEntry1] : Referred by Dr. Griffin  for Rheumatology consultation \par \par 70-year-old female was referred for positive HECTOR  , she has  history of hypertension, hyperlipidemia, tobacco use, ( quit smoking 2 months ago)  ARNOL for which she is not using CPAP, and prior left cerebellar stroke without residual deficits secondary to left vertebral artery dissection in 2016 for which she is on aspirin 81 mg and atorvastatin 80.\par Has multiple melanotic nevi, benign skin biopsy in 2018. \par macular degeneration and R eye partial visual loss, hearing problems. \par Has stable ILD,  mild unchanged GGO/NSIP  on CT scan and abnormal PFTs and 6MWT with desaturation. \par HECTOR 1:320, has negative JACQUIE \par ROS: \par R thumb pain\par Sometimes knee pain , denies  joint swelling \par has memory problems \par Feels tired. \par \par No h/o morning stiffness, memory loss, patchy hair loss, sicca symptoms, photosensitivity, HA,  Raynaud's, oral ulcers, nasal ulcers. \par No history of pleuropericarditis \par No history of protein/hematuria \par No history of cytopenias. \par No Hx of VTE/DVT/PE\par 2 pregnancy, no miscarriage \par Sister : RA \par Personal or family hx of autoimmune disease, no hx of psoriasis\par \par  [Anorexia] : no anorexia [Weight Loss] : no weight loss [Malaise] : no malaise [Fever] : no fever [Chills] : no chills [Fatigue] : no fatigue [Malar Facial Rash] : no malar facial rash [Skin Lesions] : no lesions [Skin Nodules] : no skin nodules [Oral Ulcers] : no oral ulcers [Cough] : no cough [Dry Mouth] : no dry mouth [Dysphonia] : no dysphonia [Dysphagia] : no dysphagia [Shortness of Breath] : no shortness of breath [Chest Pain] : no chest pain [Joint Swelling] : no joint swelling [Joint Warmth] : no joint warmth [Joint Deformity] : no joint deformity [Decreased ROM] : no decreased range of motion [Morning Stiffness] : no morning stiffness [Falls] : no falls [Difficulty Standing] : no difficulty standing [Difficulty Walking] : no difficulty walking [Dyspnea] : no dyspnea [Myalgias] : no myalgias [Muscle Weakness] : no muscle weakness [Muscle Spasms] : no muscle spasms [Muscle Cramping] : no muscle cramping [Visual Changes] : no visual changes [Eye Pain] : no eye pain [Eye Redness] : no eye redness

## 2021-11-18 NOTE — REVIEW OF SYSTEMS
[Feeling Tired] : feeling tired [Eyesight Problems] : eyesight problems [Dry Eyes] : dryness of the eyes [Loss Of Hearing] : hearing loss [Arthralgias] : arthralgias [Skin Lesions] : skin lesion [Change In A Mole] : change in a mole [Depression] : depression [Negative] : Respiratory [Chills] : no chills [Fever] : no fever [Feeling Poorly] : not feeling poorly [Recent Weight Gain (___ Lbs)] : no recent weight gain [Recent Weight Loss (___ Lbs)] : no recent weight loss [Eye Pain] : no eye pain [Red Eyes] : eyes not red [Nosebleeds] : no nosebleeds [Sore Throat] : no sore throat [Nasal Discharge] : no nasal discharge [Hoarseness] : no hoarseness [Anxiety] : no anxiety [Muscle Weakness] : no muscle weakness [Easy Bleeding] : no tendency for easy bleeding [Easy Bruising] : no tendency for easy bruising [Swollen Glands In The Neck] : no swollen glands in the neck [FreeTextEntry9] : R thumb arthritis

## 2021-11-18 NOTE — ASSESSMENT
[FreeTextEntry1] : 70-year-old female with  prior left cerebellar stroke without residual deficits secondary to left vertebral artery dissection in 2016 \par Has stable ILD,  mild unchanged GGO/NSIP  on CT scan and abnormal PFTs and 6MWT with desaturation. \par Was referred for positive HECTOR 1:320, has negative JACQUIE , RF,  Sjogrens serology \par exam beside R CMC arthritis and chronic skin lesions from multiple melanotic nevi,  no AI/SLE specific ROS. \par I have low suspicious for underlying SLE or other autoimmune disease to explain her ILD  but we talked about to proceed for disease specific work up. \par \par 1. The significance of positive HECTOR was discussed with the patient in great detail. I will check second tier serology by VIC. Positive HECTOR may be seen in the setting of various autoimmune diseases including but not limited to SLE, rheumatoid arthritis, Sjogren's syndrome, autoimmune thyroid disease, scleroderma and others. HECTOR may  also be present at healthy individuals. \par \par \par 2. Osteoporosis screening \par check labs and DEXA \par Labs done during the visit today\par \par \par f/u 4-6 weeks \par

## 2021-11-29 ENCOUNTER — NON-APPOINTMENT (OUTPATIENT)
Age: 71
End: 2021-11-29

## 2021-11-30 DIAGNOSIS — E55.9 VITAMIN D DEFICIENCY, UNSPECIFIED: ICD-10-CM

## 2021-11-30 LAB
25(OH)D3 SERPL-MCNC: 14.4 NG/ML
ALBUMIN MFR SERPL ELPH: 57.8 %
ALBUMIN SERPL-MCNC: 3.9 G/DL
ALBUMIN/GLOB SERPL: 1.4 RATIO
ALPHA1 GLOB MFR SERPL ELPH: 4.8 %
ALPHA1 GLOB SERPL ELPH-MCNC: 0.3 G/DL
ALPHA2 GLOB MFR SERPL ELPH: 15.3 %
ALPHA2 GLOB SERPL ELPH-MCNC: 1 G/DL
ANCA AB SER-IMP: ABNORMAL
APPEARANCE: CLEAR
APPEARANCE: CLEAR
B-GLOBULIN MFR SERPL ELPH: 12.4 %
B-GLOBULIN SERPL ELPH-MCNC: 0.8 G/DL
B2 GLYCOPROT1 IGA SERPL IA-ACNC: <5 SAU
BACTERIA: NEGATIVE
BILIRUBIN URINE: NEGATIVE
BILIRUBIN URINE: NEGATIVE
BLOOD URINE: NEGATIVE
BLOOD URINE: NEGATIVE
C-ANCA SER-ACNC: NEGATIVE
C3 SERPL-MCNC: 148 MG/DL
C4 SERPL-MCNC: 36 MG/DL
CALCIUM SERPL-MCNC: 9.9 MG/DL
COLOR: YELLOW
COLOR: YELLOW
CRP SERPL-MCNC: <3 MG/L
DEPRECATED CARDIOLIPIN IGA SER: <5 APL
DEPRECATED KAPPA LC FREE/LAMBDA SER: 1.05 RATIO
ERYTHROCYTE [SEDIMENTATION RATE] IN BLOOD BY WESTERGREN METHOD: 12 MM/HR
GAMMA GLOB FLD ELPH-MCNC: 0.6 G/DL
GAMMA GLOB MFR SERPL ELPH: 9.7 %
GLUCOSE QUALITATIVE U: NEGATIVE
GLUCOSE QUALITATIVE U: NEGATIVE
HYALINE CASTS: 0 /LPF
IGA SER QL IEP: 252 MG/DL
IGG SER QL IEP: 727 MG/DL
IGM SER QL IEP: 68 MG/DL
INTERPRETATION SERPL IEP-IMP: NORMAL
KAPPA LC CSF-MCNC: 1.56 MG/DL
KAPPA LC SERPL-MCNC: 1.64 MG/DL
KETONES URINE: NORMAL
KETONES URINE: NORMAL
LEUKOCYTE ESTERASE URINE: NEGATIVE
LEUKOCYTE ESTERASE URINE: NEGATIVE
M PROTEIN SPEC IFE-MCNC: NORMAL
MAGNESIUM SERPL-MCNC: 2 MG/DL
MICROSCOPIC-UA: NORMAL
MISCELLANEOUS TEST: NORMAL
MPO AB + PR3 PNL SER: NORMAL
NITRITE URINE: NEGATIVE
NITRITE URINE: NEGATIVE
P-ANCA TITR SER IF: NEGATIVE
PARATHYROID HORMONE INTACT: 28 PG/ML
PH URINE: 6.5
PH URINE: 6.5
PHOSPHATE SERPL-MCNC: 3.4 MG/DL
PROC NAME: NORMAL
PROT SERPL-MCNC: 6.7 G/DL
PROT SERPL-MCNC: 6.7 G/DL
PROTEIN URINE: NEGATIVE
PROTEIN URINE: NEGATIVE
RED BLOOD CELLS URINE: 1 /HPF
SPECIFIC GRAVITY URINE: 1.02
SPECIFIC GRAVITY URINE: 1.02
SQUAMOUS EPITHELIAL CELLS: 2 /HPF
TSH SERPL-ACNC: 2.39 UIU/ML
UROBILINOGEN URINE: NORMAL
UROBILINOGEN URINE: NORMAL
WHITE BLOOD CELLS URINE: 1 /HPF

## 2021-12-10 ENCOUNTER — NON-APPOINTMENT (OUTPATIENT)
Age: 71
End: 2021-12-10

## 2021-12-12 ENCOUNTER — NON-APPOINTMENT (OUTPATIENT)
Age: 71
End: 2021-12-12

## 2021-12-12 ENCOUNTER — TRANSCRIPTION ENCOUNTER (OUTPATIENT)
Age: 71
End: 2021-12-12

## 2021-12-12 LAB — SARS-COV-2 N GENE NPH QL NAA+PROBE: NOT DETECTED

## 2021-12-14 ENCOUNTER — APPOINTMENT (OUTPATIENT)
Dept: PULMONOLOGY | Facility: CLINIC | Age: 71
End: 2021-12-14
Payer: MEDICARE

## 2021-12-14 VITALS
BODY MASS INDEX: 31.66 KG/M2 | HEIGHT: 66 IN | WEIGHT: 197 LBS | SYSTOLIC BLOOD PRESSURE: 117 MMHG | TEMPERATURE: 97.3 F | OXYGEN SATURATION: 95 % | HEART RATE: 66 BPM | DIASTOLIC BLOOD PRESSURE: 71 MMHG

## 2021-12-14 PROCEDURE — 94010 BREATHING CAPACITY TEST: CPT

## 2021-12-14 PROCEDURE — 94727 GAS DIL/WSHOT DETER LNG VOL: CPT

## 2021-12-14 PROCEDURE — ZZZZZ: CPT

## 2021-12-14 PROCEDURE — 94618 PULMONARY STRESS TESTING: CPT

## 2021-12-14 PROCEDURE — 99215 OFFICE O/P EST HI 40 MIN: CPT

## 2021-12-14 PROCEDURE — 94729 DIFFUSING CAPACITY: CPT

## 2021-12-14 NOTE — PROCEDURE
[FreeTextEntry1] : PFT 21\par FVC: 2.70 L (85%)\par FEV1: 2.07 L (87%)\par FEV1/FVC: 77%\par FEF 25-75%: 1.73 L/s (90%)\par TLC: 3.53 L (67%)\par RV/T%\par DLCO: 11.19 (50%)\par \par 6MWT 21\par Patient walked 450 meters during a 6 minute walk test.\par Resting O2 saturation was 96% on RA.  Heart rate 60 bpm.\par End test O2 saturation was 94% on RA.  Heart rate 79 bpm.  Ulises scale end of test: 0.5\par \par PFT 10/13/21\par FVC: 2.71 L (84%)--> 2.76 L (86%) \par FEV1: 2.18 L (90%)--> 2.20 L (90%) \par FEV1/FVC: 81%--> 80%\par UTG86-27%: 2.29 L/s (116%)--> 2.24 L/s (114%)\par TLC: 3.76 L (71%)\par RV/T.85%\par DLCO: 10.68 (48%)\par \par DLCO: 15.0 (69%) -> DLCO: 17.7 (82%) -> 11.0 (52%) -> 12.55 (61%) - 10.68 (48%)\par \par 6MWT 10/13/21\par Patient walked 544 meters during a 6 minute walk test.\par Resting O2 saturation was 97% on RA. Heart rate 67 bpm.\par End test O2 saturation was 93% on RA. Heart rate 99 bpm. \par \par CT chest 21\par IMPRESSION:\par Stable extent and distribution of interstitial lung changes of peripheral reticular and groundglass opacities, which can be seen in NSIP. \par \par PFT 21\par FVC: 2.61 L (87%)\par FEV1: 2.11 L (91%)\par FEV1/FVC: 81%\par FEF 25-75%: 2.46 L/s (129%)\par TLC: 3.81 L (72%)\par RV/T%\par DLCO: 12.55 (61%)\par Normal spirometry.\par mildly decreased lung volumes\par mildly decreased diffusion capacity\par \par PFT 3/4/21\par FVC: 2.64 L (82%)\par FEV1: 2.12 L (87%)\par FEV1/FVC: 80%\par FEF 25-75%: 2.22 L/s (112%)\par TLC:3.86 L (74%)\par RV/T%\par DLCO: 11.0 (52%)\par Normal spirometry\par mildly decreased lung volume\par moderate diffusion capacity\par \par 6MWT 3/4/21\par Patient walked 487 meters during a 6 minute walk test.\par Resting O2 saturation was 96% on RA. Heart rate 64 bpm.\par End test O2 saturation was 96% on RA. Heart rate 81 bpm. Ulises scale end of test - 1 "very slight"\par This signifies normal walk distance and no desaturation\par \par CT chest 2021:\par Impression:\par 1. 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 5 mm solid nodule right upper lobe-stable.\par \par EXAM: CT LDCT LUNG CA SCRN ANNUAL PROCEDURE DATE: 2020 \par Additional imaging was performed in the prone position complete evaluation of the dependent regions of the lungs. \par Lungs and airways: Image numbers for description of nodule location refer to thin section axial series number 4. \par 1.1 cm air cyst within the anterior segment of the right upper lobe (image 155). There are scattered solid subcentimeter and micronodules as well as benign perifissural lymph nodes. The largest solid nodule measures 5 mm, is noted within the right upper lobe laterally (image 165). There is a peripheral predominantly lower lung zone reticular and groundglass pattern which persists on prone imaging. There may be some subpleural sparing. There is temporal homogeneity. There is no evidence of pulmonary fibrosis. These constellations of findings are suggestive of NSIP. \par Trachea and central bronchi patent. \par Pleura: The pleural spaces are clear. \par Base of neck, mediastinum and heart: The thyroid gland is normal. No mediastinal, hilar or axillary lymphadenopathy is seen. The heart and pericardium are within normal limits. Mild calcification of the aortic valve. Small calcified atheromatous plaque formation is noted throughout the thoracic aorta and proximal abdominal aorta and side branches. \par Coronary artery calcification: None \par Soft tissues: Normal. \par Abdomen: Small type I hiatal hernia. \par Bones: Mild to moderate multilevel degenerative disc disease. Small benign-appearing bone island involving T5 \par Impression: \par 1. Peripheral reticular and groundglass lower lung zone pattern with temporal homogeneity which persists on prone imaging. There may be some mild subpleural sparing. There is no pulmonary fibrosis. 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 Abbreviated differential includes LIP and DIP. \par 2. Scattered solid subcentimeter and micronodules measuring up to 5 mm within the right upper lobe. \par Lung-RADS category: 2S - Benign appearance or behavior. Nodules with very low likelihood of becoming a clinically active cancer due to size or lack of growth. Probability of malignancy < 1%. \par \par Spirometry, DLCO 10/3/19:\par FVC: 2.84 L (96%)\par FEV1: 2.23 L (95%)\par FEV1/FVC: 79%\par KBM11-82%: 2.12 L/s (88%)\par DLCO: 17.7 (82%)\par Normal spirometry. Normal diffusion capacity.\par \par CPAP titration 19: recommended 10 cm H2O, autoPAP was ordered\par PSG 3/13/19: AHI CMS 32, min SpO2 79%, time <= 88% 1.2 min\par \par PFT, 6MWT 19:\par FVC: 2.72 L (92%)\par FEV1: 2.38 L (101%)\par FEV1/FVC: 87%\par MVO43-79%: 3.20 L/s (133%)\par T.23 L (84%)\par RV/T%\par DLCO: 15.0 (69%)\par 6MWT: 439 meters, SpO2 start: 97% --> SpO2 end: 95%\par Normal spirometry. Lung volumes normal. Mildly reduced diffusion capacity. Normal walk distance with no desaturation. \par \par EXAM: CT LDCT LUNG CA SCRN BASELINE \par *** ADDENDUM 2019 *** \par Addendum: \par Abbreviated differential for these peripheral groundglass opacities in the setting of smoking must extend to include DIP. Bronchoscopic correlation and additional HRCT imaging to include end inspiration/end expiration is suggested. \par *** END OF ADDENDUM 2019 *** \par \par PROCEDURE DATE: 2019 \par INTERPRETATION: CT scan of the chest without intravenous contrast, using low-dose lung cancer screening protocol \par History: 65-year-old female nonsmoker with a 37.5 pack year history of smoking \par Comparison: None. \par Findings: \par Lungs and airways: Image numbers for description of nodule location refer to thin section axial series number 4. Scattered calcified granulomata. 4 mm nodule within the right upper lobe (image 166). Is a mild peripheral reticular and groundglass opacities which demonstrates temporal homogeneity with a suggestion of some subpleural sparing involving the upper and lower lung zones slightly more pronounced inferiorly. There are some scattered tree-in-bud centrilobular micronodules. No significant air-trapping is noted. 9 mm air cyst is noted within the lingula (image 167) \par Trachea and central bronchi patent. \par Pleura: The pleural spaces are clear. \par Base of neck, mediastinum and heart: The thyroid gland demonstrates coarse parenchymal calcifications. No mediastinal, hilar or axillary lymphadenopathy is seen. The heart and pericardium are within normal limits. \par Mild calcified atheromatous plaque formation involving the thoracic aorta most pronounced in the arch and proximal abdominal regions \par Coronary artery calcification: None \par Soft tissues: Normal. \par Abdomen: This study was performed without contrast and with lower than standard dose. These factors reduce sensitivity for detection of small lesions in the upper abdomen. Given these technical limitations, no focal lesion is seen within the visualized organs. \par Bones: Mild age-appropriate degenerative change. Small sclerotic focus involving the T5 vertebral body compatible with a probable bone island. \par Impression: \par 1. Scattered tree-in-bud centrilobular micronodules with mild small airway inflammation. No significant air-trapping is identified. \par 2. Mild peripheral reticular and groundglass opacities involving all 5 pulmonary lobes with a slightly lower lung zone predilection. There is relative temporal homogeneity with possibly some subpleural sparing. No \par pulmonary fibrosis. Abbreviated differential includes an NSIP pattern. \par Respiratory bronchiolitis although centrilobular groundglass nodules are not well-visualized, and LIP cannot be excluded. Further imaging to include HRCT to be performed at end inspiration/end expiration is suggested for more complete evaluation \par 3. 4 mm nodule within the right upper lobe. Scattered calcified granulomata. \par Lung-RADS category: 2S - Benign appearance or behavior. Nodules with very low likelihood of becoming a clinically active cancer due to size or lack of growth. Probability of malignancy < 1%. \par Recommendation: \par 2S - Continue annual screening with LDCT in 12 months.

## 2021-12-14 NOTE — PHYSICAL EXAM
[No Acute Distress] : no acute distress [Normal Oropharynx] : normal oropharynx [Normal Appearance] : normal appearance [No Neck Mass] : no neck mass [Normal Rate/Rhythm] : normal rate/rhythm [Normal S1, S2] : normal s1, s2 [No Resp Distress] : no resp distress [No Abnormalities] : no abnormalities [Benign] : benign [Normal Gait] : normal gait [No Clubbing] : no clubbing [No Cyanosis] : no cyanosis [No Edema] : no edema [FROM] : FROM [Normal Color/ Pigmentation] : normal color/ pigmentation [No Focal Deficits] : no focal deficits [Oriented x3] : oriented x3 [Normal Affect] : normal affect [TextBox_54] : 2/6 systolic murmur, aortic area, radiating to the left sternal border [TextBox_68] : Very fine, early velcro crackles appreciated at both bases 1/3 of the way up (L > R), no squeaks, no dullness [TextBox_105] : Mild OA changes in DIPs, no thickening of the skin, no palmar erythema, no raynaud's phenomenon.

## 2021-12-14 NOTE — DISCUSSION/SUMMARY
[FreeTextEntry1] : ATTENDING'S SUMMARY:\par 12-14-21:\par mild pallor, no icterus\par no JVD, no hepatojugular reflux, no HSM\par no cervical adenopathy, no supraclavicular adenopathy\par 2/6 systolic murmur, aortic area, radiating to the left sternal border\par Very fine, early velcro crackles appreciated at both bases 1/3 of the way up (L > R), no squeaks, no dullness\par no cyanosis, no obvious clubbing, no articular manifestations, no thickening of the skin. Mild OA changes in DIPs, no thickening of the skin, no palmar erythema, no raynaud's phenomenon.\par no pedal edema

## 2021-12-14 NOTE — HISTORY OF PRESENT ILLNESS
[TextBox_4] : Pt is 67 yo F with PMH TIA, left vertebral dissection, HTN, stable pre-diabetes, left ICA stenosis, GERD, duodenal ulcer, arthritis.\par 37 pack year history of smoking, still currently smoking. \par \par First visit 2/19/19: Has tried patches and lozenges in the past. Wants to start Chantix, was prescribed by Dr. Jacob last week but she states she couldn't afford the co-pay. \par Last cigarette was earlier today. Smoking roughly 10 cig/week. \par \par Has mild cough, cough is dry. \par Denies chest tightness, wheezing, recurrent respiratory tract infections. \par No ED visits or hospitalizations for breathing. \par No diagnosis of asthma or PNA in the past. No personal history of blood clots or cancers. \par Currently no pets. Had a parakeet for 8 years, 25 years ago. \par She has down throw pillows. She has a 10-year old carpet in her bedroom. \par No mold in the homes.\par She had a home in [place] [id number] where radon levels were borderline elevated.\par No other inhalational exposures she is aware of. \par She has a history of snoring.\par \par FHx: sister breast CA (diagnosed around age 65 yo)\par PCN: penicillin (angioedema)\par ****\par \par 12-14-21:\par She saw rheumatologist, Dr. Jimenez on 11/18\par She had PFT, 6MWT today. \par She has company over so she knew she would be tired walking today\par She stopped smoking 4 months ago

## 2021-12-14 NOTE — ASSESSMENT
[FreeTextEntry1] : 12-14-21:\par It was a pleasure to see Rashmi in follow-up today via telehealth.    Her respiratory issues are as follows:\par \par 1.   Interstitial lung abnormality (ISAAC) \par Rashmi had a CT chest as part of the lung cancer screening program on 1/25/19; it was her first dedicated chest CT.  It demonstrated peripheral reticular and groundglass opacities, particularly posteriorly. There were also some scattered TIB micronodules and a 9 mm  cyst in the lingula. While the posterior predilection of the groundglass opacities could have pointed toward dependent atelectasis, her repeat LCS CT on 2/4/20 demonstrated that the abnormalities were still present in the prone position. Findings are still predominantly groundglass, more prominent on the right side. She fulfills the definition of ISAAC as it was an incidental finding, she is not symptomatic. In lung cancer screened patients, ISAAC appears to be age related and is seen in up to 10% of patients\par In this patient, ISAAC features that portend good prognosis include predominantly ground glass opacities and absence of architectural distortion or traction bronchiectasis. Features that predict progression include subpleural location   \par \par -At a prior visit, we checked HECTOR (negative), CH50 (WNL), and RF (18). We rechecked autoimmune serologies on 2/16/21 showing normal RF, elevated CH50, elevated HECTOR (1:320, homogenous).\par -PFTs on 2/19/19 were normal and showed no evidence of obstruction, DLCO is normal (82%). Her last 6MWT was a relatively normal test. She had repeat PFT on 3/4/21 showing stable FVC and a mild decrease in FEV1 since 2019. Of note, there has been about a 10% decrease in TLC and over 15% decrease in DLCO. We had Rashmi repeat PFT again on 10/13/21. Her DLCO has stayed stable overall, 11.0 (52%) -> 12.55 (61%) - 10.68 (48%). Her 6MWT distance on 10/13/21 was significantly improved (487 meters -> 544 meters). There was a 4% desaturation (spO2 97% -> 93%).\par -We reviewed her chest CT from 1/9/2021, which was stable. There is a predominance of ground glass opacities. The CT picture is indeterminate for IPF.\par -Echocardiogram done 3/10/21 showed normal pulmonary pressures, RVSP 27 mmHg\par -CT chest 8/12/21 was stable in regards to interstitial lung changes of peripheral reticular and groundglass opacities. The predominant abnormality is ground glass opacities seen in the upper lung zones, subpleural area. Areas of well delineated mosaic attenuation also in the subpleural area. Small cysts present in the upper lobe medially. Mildly dilated airways suggestive of bronchiectasis. No significant cranial caudal gradient\par -Her HECTOR was elevated to 1:320 so we referred her to rheumatology. She saw Dr. Jimenez on 11/18 and CTD panel was negative.\par -PFT 12/14/21 shows stable spirometry without restrictive lung defect. Her total lung capacity is mildly reduced, 3.53 L (67%). Diffusion capacity is stable, 11.19 (50%). On 6MWT, she covered 450 meters without significant desaturation (SpO2 96% -> 94%). This is a decrease in her prior walk distance of 544 meters. She states she has people visiting her home and she is more tired than usual.\par \par The differential diagnosis is broad:\par a. Autoimmune disease. She has several features that indicate this may be autoimmune related disease (elevated HECTOR). She does not have any skin findings.\par b. Hypersensitivity pneumonitis from down feathers, chemical sprays\par c. Smoking related interstitial lung disease. She has almost 40 PY smoking history. She quit smoking 4 months ago\par d. idiopathic pulmonary fibrosis would be lower down on the list as the CT findings are consistent with an alternative diagnosis to IPF.\par \par Plan:\par -We will refer her to Dr. Garcias for a bronchoscopy with BAL, transbronchial biopsy, send for granulomas. We will also request for the Envisia  to be sent.\par - We will continue to follow CT scans every 6 months to evaluate for progression of interstitial lung abnormality. She will be due for her next CT in February 2022\par -We will repeat PFT and 6MWT every 6 months for the first 2 years. We will look to see if there is a drop in FVC and TLC drop > than 10% or a drop in diffusion capacity > than 15%. \par -Based on the above testing, we will discuss if there is a need for treatment with anti fibrotics (pirfenidone, nintedanib)\par -She was counseled against using chemical sprays, which she sprays on a daily basis.\par -She has a down feather comforter and mattress cover.  She was advised to get rid of these immediately\par -She has gotten rid of rugs in her home\par \par 2.    Smoking related lung disease\par Another possibility to explain the bilateral  ground glass opacities  is  Desquamative interstitial  pneumonia or RB-ILD. She stopped smoking about 4 months ago. \par \par 3. Active smoking\par We had a detailed discussion about the effects of smoking on ISAAC, including its development and progression.  We discussed a study in CHEST that the incidence of lung cancer in the setting of ISAAC may be higher. She restarted smoking during the pandemic, 1/2 pack per day, but stopped 4 months ago.\par \par 4. Pulmonary nodule\par Right upper lobe nodule is stable, 5 mm. No significant change in size or morphology on recent CT done 1/2021. There is a calcified granuloma in the left upper lobe that does not require follow up. There are a few other micronodules seen bilaterally. Chest CT done 8/12/21 shows the 5mm nodule is unchanged. We will hold off continuing her enrollment in the lung cancer screening program as we work up her interstitial lung disease.\par \par 5.  ARNOL\par Risk factors included Mallampati score of IV and possible snoring.  Diagnosed with ARNOL but had difficulty tolerating CPAP.  She is followed by Dr. Streeter. We recommended she make a follow up appointment with Dr. Streeter so she can restart using her CPAP.\par \par 6. Cystic lesions\par There is a cyst seen in the lung. looking for nay increase in size or number of cystic lesions. These cysts can be seen with Sjogren's syndrome. She denies eye dryness. Generally she does not have dry mouth. CT chest 8/12/21 reviewed and was stable. Her HECTOR was elevated to 1:320 so we referred her to rheumatology. She saw Dr. Jimenez on 11/18 and CTD panel was negative. \par \par Return to clinic: 6 months

## 2021-12-15 ENCOUNTER — NON-APPOINTMENT (OUTPATIENT)
Age: 71
End: 2021-12-15

## 2021-12-15 LAB
ALBUMIN SERPL ELPH-MCNC: 4.3 G/DL
ALP BLD-CCNC: 86 U/L
ALT SERPL-CCNC: 21 U/L
ANION GAP SERPL CALC-SCNC: 13 MMOL/L
APTT BLD: 35.1 SEC
AST SERPL-CCNC: 20 U/L
BASOPHILS # BLD AUTO: 0.06 K/UL
BASOPHILS NFR BLD AUTO: 0.7 %
BILIRUB SERPL-MCNC: 0.7 MG/DL
BUN SERPL-MCNC: 16 MG/DL
CALCIUM SERPL-MCNC: 9.4 MG/DL
CHLORIDE SERPL-SCNC: 102 MMOL/L
CO2 SERPL-SCNC: 24 MMOL/L
CREAT SERPL-MCNC: 0.84 MG/DL
EOSINOPHIL # BLD AUTO: 0.19 K/UL
EOSINOPHIL NFR BLD AUTO: 2.1 %
GLUCOSE SERPL-MCNC: 72 MG/DL
HCT VFR BLD CALC: 42 %
HGB BLD-MCNC: 13.9 G/DL
IMM GRANULOCYTES NFR BLD AUTO: 0.2 %
INR PPP: 1 RATIO
LYMPHOCYTES # BLD AUTO: 2.6 K/UL
LYMPHOCYTES NFR BLD AUTO: 29 %
MAN DIFF?: NORMAL
MCHC RBC-ENTMCNC: 29.3 PG
MCHC RBC-ENTMCNC: 33.1 GM/DL
MCV RBC AUTO: 88.6 FL
MONOCYTES # BLD AUTO: 0.74 K/UL
MONOCYTES NFR BLD AUTO: 8.2 %
NEUTROPHILS # BLD AUTO: 5.36 K/UL
NEUTROPHILS NFR BLD AUTO: 59.8 %
PLATELET # BLD AUTO: 241 K/UL
POTASSIUM SERPL-SCNC: 4.1 MMOL/L
PROT SERPL-MCNC: 6.8 G/DL
PT BLD: 11.8 SEC
RBC # BLD: 4.74 M/UL
RBC # FLD: 14.2 %
SODIUM SERPL-SCNC: 140 MMOL/L
WBC # FLD AUTO: 8.97 K/UL

## 2021-12-29 ENCOUNTER — NON-APPOINTMENT (OUTPATIENT)
Age: 71
End: 2021-12-29

## 2021-12-29 ENCOUNTER — APPOINTMENT (OUTPATIENT)
Dept: HEART AND VASCULAR | Facility: CLINIC | Age: 71
End: 2021-12-29
Payer: MEDICARE

## 2021-12-29 VITALS
HEART RATE: 63 BPM | HEIGHT: 66 IN | DIASTOLIC BLOOD PRESSURE: 64 MMHG | BODY MASS INDEX: 30.53 KG/M2 | OXYGEN SATURATION: 98 % | WEIGHT: 190 LBS | SYSTOLIC BLOOD PRESSURE: 130 MMHG | TEMPERATURE: 98 F

## 2021-12-29 DIAGNOSIS — Z01.810 ENCOUNTER FOR PREPROCEDURAL CARDIOVASCULAR EXAMINATION: ICD-10-CM

## 2021-12-29 PROCEDURE — 99214 OFFICE O/P EST MOD 30 MIN: CPT

## 2021-12-29 PROCEDURE — 93000 ELECTROCARDIOGRAM COMPLETE: CPT

## 2021-12-29 NOTE — REASON FOR VISIT
Yes [Hypertension] : hypertension [Follow-Up - Clinic] : a clinic follow-up of [FreeTextEntry2] : HTN

## 2021-12-29 NOTE — HISTORY OF PRESENT ILLNESS
[FreeTextEntry1] : 71 F with tia htn carotid stenosis ccta 2016 normal coronaries echo asymmetric septal hypertrophy with dynamic outflow tract obstruction peak gradient 47 resting 14 mmHg. negative genetic testing negative CMRI ILD ARNOL Interstitial lung abnormality \par \par here for preoperative cardiac optimization prior to bronchoscopy she feels well has no chest pain her functional capacity has remained stable \par \par \par \par \par ecg nsr lvh nsst changes

## 2021-12-29 NOTE — PHYSICAL EXAM
[Well Developed] : well developed [Well Nourished] : well nourished [No Acute Distress] : no acute distress [Normal Venous Pressure] : normal venous pressure [No Carotid Bruit] : no carotid bruit [Normal S1, S2] : normal S1, S2 [No Murmur] : no murmur [No Rub] : no rub [No Gallop] : no gallop [Clear Lung Fields] : clear lung fields [Good Air Entry] : good air entry [No Respiratory Distress] : no respiratory distress  [Soft] : abdomen soft [Non Tender] : non-tender [No Masses/organomegaly] : no masses/organomegaly [Normal Bowel Sounds] : normal bowel sounds [Normal Gait] : normal gait [No Edema] : no edema [No Cyanosis] : no cyanosis [No Clubbing] : no clubbing [No Varicosities] : no varicosities [No Rash] : no rash [No Skin Lesions] : no skin lesions [Moves all extremities] : moves all extremities [No Focal Deficits] : no focal deficits [Normal Speech] : normal speech [Alert and Oriented] : alert and oriented [Normal memory] : normal memory [General Appearance - Well Developed] : well developed [Normal Appearance] : normal appearance [Well Groomed] : well groomed [General Appearance - Well Nourished] : well nourished [No Deformities] : no deformities [General Appearance - In No Acute Distress] : no acute distress [Normal Conjunctiva] : the conjunctiva exhibited no abnormalities [Eyelids - No Xanthelasma] : the eyelids demonstrated no xanthelasmas [Normal Oral Mucosa] : normal oral mucosa [No Oral Pallor] : no oral pallor [No Oral Cyanosis] : no oral cyanosis [Normal Jugular Venous A Waves Present] : normal jugular venous A waves present [Normal Jugular Venous V Waves Present] : normal jugular venous V waves present [No Jugular Venous Burks A Waves] : no jugular venous burks A waves [Exaggerated Use Of Accessory Muscles For Inspiration] : no accessory muscle use [Respiration, Rhythm And Depth] : normal respiratory rhythm and effort [Auscultation Breath Sounds / Voice Sounds] : lungs were clear to auscultation bilaterally [Heart Rate And Rhythm] : heart rate and rhythm were normal [Heart Sounds] : normal S1 and S2 [Murmurs] : no murmurs present [Abdomen Soft] : soft [Abdomen Tenderness] : non-tender [Abdomen Mass (___ Cm)] : no abdominal mass palpated [Abnormal Walk] : normal gait [Gait - Sufficient For Exercise Testing] : the gait was sufficient for exercise testing [Nail Clubbing] : no clubbing of the fingernails [Cyanosis, Localized] : no localized cyanosis [Petechial Hemorrhages (___cm)] : no petechial hemorrhages [Skin Color & Pigmentation] : normal skin color and pigmentation [] : no rash [No Venous Stasis] : no venous stasis [Skin Lesions] : no skin lesions [No Skin Ulcers] : no skin ulcer [No Xanthoma] : no  xanthoma was observed [Oriented To Time, Place, And Person] : oriented to person, place, and time [Affect] : the affect was normal [Mood] : the mood was normal [No Anxiety] : not feeling anxious [FreeTextEntry1] : systolic murmur

## 2022-01-01 NOTE — ED ADULT TRIAGE NOTE - CHIEF COMPLAINT QUOTE
23 Pt with hx of HTN, presents to ER c/o intermittent fever/chills, H/A and "achy legs" for 5 days. Pt reports last tylenol intake last night.

## 2022-01-03 DIAGNOSIS — Z11.52 ENCOUNTER FOR SCREENING FOR COVID-19: ICD-10-CM

## 2022-01-04 ENCOUNTER — LABORATORY RESULT (OUTPATIENT)
Age: 72
End: 2022-01-04

## 2022-01-07 ENCOUNTER — RESULT REVIEW (OUTPATIENT)
Age: 72
End: 2022-01-07

## 2022-01-07 ENCOUNTER — OUTPATIENT (OUTPATIENT)
Dept: OUTPATIENT SERVICES | Facility: HOSPITAL | Age: 72
LOS: 1 days | Discharge: HOME | End: 2022-01-07
Payer: MEDICARE

## 2022-01-07 ENCOUNTER — APPOINTMENT (OUTPATIENT)
Dept: PULMONOLOGY | Facility: HOSPITAL | Age: 72
End: 2022-01-07

## 2022-01-07 LAB
B PERT IGG+IGM PNL SER: SIGNIFICANT CHANGE UP
B PERT IGG+IGM PNL SER: SIGNIFICANT CHANGE UP
COLOR FLD: SIGNIFICANT CHANGE UP
COLOR FLD: SIGNIFICANT CHANGE UP
COMMENT - FLUIDS: SIGNIFICANT CHANGE UP
EOSINOPHIL # FLD: 1 % — SIGNIFICANT CHANGE UP
FLUID INTAKE SUBSTANCE CLASS: SIGNIFICANT CHANGE UP
FLUID INTAKE SUBSTANCE CLASS: SIGNIFICANT CHANGE UP
FLUID SEGMENTED GRANULOCYTES: 0 % — SIGNIFICANT CHANGE UP
FLUID SEGMENTED GRANULOCYTES: 2 % — SIGNIFICANT CHANGE UP
GRAM STN FLD: SIGNIFICANT CHANGE UP
LYMPHOCYTES # FLD: 1 % — SIGNIFICANT CHANGE UP
LYMPHOCYTES # FLD: 1 % — SIGNIFICANT CHANGE UP
MONOS+MACROS # FLD: 18 % — SIGNIFICANT CHANGE UP
MONOS+MACROS # FLD: 28 % — SIGNIFICANT CHANGE UP
RCV VOL RI: 1 /UL — HIGH (ref 0–0)
RCV VOL RI: 18 /UL — HIGH (ref 0–0)
SPECIMEN SOURCE FLD: SIGNIFICANT CHANGE UP
SPECIMEN SOURCE FLD: SIGNIFICANT CHANGE UP
SPECIMEN SOURCE: SIGNIFICANT CHANGE UP
TOTAL NUCLEATED CELL COUNT, BODY FLUID: 22 /UL — SIGNIFICANT CHANGE UP
TOTAL NUCLEATED CELL COUNT, BODY FLUID: 29 /UL — SIGNIFICANT CHANGE UP
TUBE TYPE: SIGNIFICANT CHANGE UP
TUBE TYPE: SIGNIFICANT CHANGE UP

## 2022-01-07 PROCEDURE — 71045 X-RAY EXAM CHEST 1 VIEW: CPT | Mod: 26

## 2022-01-07 PROCEDURE — 88112 CYTOPATH CELL ENHANCE TECH: CPT

## 2022-01-07 PROCEDURE — 31624 DX BRONCHOSCOPE/LAVAGE: CPT

## 2022-01-07 PROCEDURE — 71045 X-RAY EXAM CHEST 1 VIEW: CPT

## 2022-01-07 PROCEDURE — 31629 BRONCHOSCOPY/NEEDLE BX EACH: CPT

## 2022-01-07 PROCEDURE — 31624 DX BRONCHOSCOPE/LAVAGE: CPT | Mod: GC

## 2022-01-07 PROCEDURE — 88305 TISSUE EXAM BY PATHOLOGIST: CPT | Mod: 26

## 2022-01-07 PROCEDURE — 87206 SMEAR FLUORESCENT/ACID STAI: CPT

## 2022-01-07 PROCEDURE — 88112 CYTOPATH CELL ENHANCE TECH: CPT | Mod: 26

## 2022-01-07 PROCEDURE — 87116 MYCOBACTERIA CULTURE: CPT

## 2022-01-07 PROCEDURE — 87102 FUNGUS ISOLATION CULTURE: CPT

## 2022-01-07 PROCEDURE — 87015 SPECIMEN INFECT AGNT CONCNTJ: CPT

## 2022-01-07 PROCEDURE — 31628 BRONCHOSCOPY/LUNG BX EACH: CPT | Mod: GC

## 2022-01-07 PROCEDURE — 87070 CULTURE OTHR SPECIMN AEROBIC: CPT

## 2022-01-07 PROCEDURE — 89051 BODY FLUID CELL COUNT: CPT

## 2022-01-07 PROCEDURE — 88305 TISSUE EXAM BY PATHOLOGIST: CPT

## 2022-01-07 RX ORDER — ONDANSETRON 8 MG/1
4 TABLET, FILM COATED ORAL ONCE
Refills: 0 | Status: DISCONTINUED | OUTPATIENT
Start: 2022-01-07 | End: 2022-01-07

## 2022-01-08 LAB
NIGHT BLUE STAIN TISS: SIGNIFICANT CHANGE UP
SPECIMEN SOURCE: SIGNIFICANT CHANGE UP

## 2022-01-09 LAB
CULTURE RESULTS: SIGNIFICANT CHANGE UP
SPECIMEN SOURCE: SIGNIFICANT CHANGE UP

## 2022-01-10 ENCOUNTER — NON-APPOINTMENT (OUTPATIENT)
Age: 72
End: 2022-01-10

## 2022-01-10 LAB — NON-GYNECOLOGICAL CYTOLOGY STUDY: SIGNIFICANT CHANGE UP

## 2022-01-11 ENCOUNTER — NON-APPOINTMENT (OUTPATIENT)
Age: 72
End: 2022-01-11

## 2022-01-11 LAB — SURGICAL PATHOLOGY STUDY: SIGNIFICANT CHANGE UP

## 2022-01-12 ENCOUNTER — NON-APPOINTMENT (OUTPATIENT)
Age: 72
End: 2022-01-12

## 2022-01-14 ENCOUNTER — NON-APPOINTMENT (OUTPATIENT)
Age: 72
End: 2022-01-14

## 2022-02-04 ENCOUNTER — APPOINTMENT (OUTPATIENT)
Dept: INTERNAL MEDICINE | Facility: CLINIC | Age: 72
End: 2022-02-04
Payer: MEDICARE

## 2022-02-04 VITALS
SYSTOLIC BLOOD PRESSURE: 119 MMHG | WEIGHT: 191 LBS | TEMPERATURE: 97.2 F | OXYGEN SATURATION: 96 % | HEIGHT: 66 IN | DIASTOLIC BLOOD PRESSURE: 70 MMHG | BODY MASS INDEX: 30.7 KG/M2 | HEART RATE: 63 BPM

## 2022-02-04 DIAGNOSIS — Z13.820 ENCOUNTER FOR SCREENING FOR OSTEOPOROSIS: ICD-10-CM

## 2022-02-04 DIAGNOSIS — Z00.00 ENCOUNTER FOR GENERAL ADULT MEDICAL EXAMINATION W/OUT ABNORMAL FINDINGS: ICD-10-CM

## 2022-02-04 PROCEDURE — G0439: CPT

## 2022-02-04 RX ORDER — ERGOCALCIFEROL 1.25 MG/1
1.25 MG CAPSULE, LIQUID FILLED ORAL
Qty: 4 | Refills: 5 | Status: DISCONTINUED | COMMUNITY
Start: 2021-11-30 | End: 2022-02-04

## 2022-02-05 LAB
CULTURE RESULTS: SIGNIFICANT CHANGE UP
SPECIMEN SOURCE: SIGNIFICANT CHANGE UP

## 2022-02-13 RX ORDER — FLUOXETINE HYDROCHLORIDE 60 MG/1
60 TABLET ORAL
Qty: 90 | Refills: 0 | Status: DISCONTINUED | COMMUNITY
End: 2022-02-13

## 2022-02-26 LAB
CULTURE RESULTS: SIGNIFICANT CHANGE UP
SPECIMEN SOURCE: SIGNIFICANT CHANGE UP

## 2022-03-14 ENCOUNTER — TRANSCRIPTION ENCOUNTER (OUTPATIENT)
Age: 72
End: 2022-03-14

## 2022-04-11 PROBLEM — Z11.59 SCREENING FOR VIRAL DISEASE: Status: ACTIVE | Noted: 2021-10-15

## 2022-04-16 ENCOUNTER — RX RENEWAL (OUTPATIENT)
Age: 72
End: 2022-04-16

## 2022-04-27 ENCOUNTER — RX RENEWAL (OUTPATIENT)
Age: 72
End: 2022-04-27

## 2022-05-11 ENCOUNTER — NON-APPOINTMENT (OUTPATIENT)
Age: 72
End: 2022-05-11

## 2022-05-16 ENCOUNTER — NON-APPOINTMENT (OUTPATIENT)
Age: 72
End: 2022-05-16

## 2022-05-16 LAB — SARS-COV-2 N GENE NPH QL NAA+PROBE: NOT DETECTED

## 2022-05-17 ENCOUNTER — APPOINTMENT (OUTPATIENT)
Dept: PULMONOLOGY | Facility: CLINIC | Age: 72
End: 2022-05-17
Payer: MEDICARE

## 2022-05-17 ENCOUNTER — NON-APPOINTMENT (OUTPATIENT)
Age: 72
End: 2022-05-17

## 2022-05-17 PROCEDURE — 94729 DIFFUSING CAPACITY: CPT

## 2022-05-17 PROCEDURE — 94727 GAS DIL/WSHOT DETER LNG VOL: CPT

## 2022-05-17 PROCEDURE — 94618 PULMONARY STRESS TESTING: CPT

## 2022-05-17 PROCEDURE — 94060 EVALUATION OF WHEEZING: CPT

## 2022-05-17 PROCEDURE — ZZZZZ: CPT

## 2022-05-31 ENCOUNTER — RX RENEWAL (OUTPATIENT)
Age: 72
End: 2022-05-31

## 2022-06-02 ENCOUNTER — APPOINTMENT (OUTPATIENT)
Dept: PULMONOLOGY | Facility: CLINIC | Age: 72
End: 2022-06-02
Payer: MEDICARE

## 2022-06-02 PROCEDURE — 99214 OFFICE O/P EST MOD 30 MIN: CPT | Mod: 95

## 2022-06-02 NOTE — ASSESSMENT
[FreeTextEntry1] : 6-2-22:\par It was a pleasure to see Rashmi in follow-up today via telehealth.    Her respiratory issues are as follows:\par \par 1.   Interstitial lung abnormality (ISAAC) \par Rashmi had a CT chest as part of the lung cancer screening program on 1/25/19; it was her first dedicated chest CT.  It demonstrated peripheral reticular and groundglass opacities, particularly posteriorly. There were also some scattered TIB micronodules and a 9 mm  cyst in the lingula. While the posterior predilection of the groundglass opacities could have pointed toward dependent atelectasis, her repeat LCS CT on 2/4/20 demonstrated that the abnormalities were still present in the prone position. Findings are still predominantly groundglass, more prominent on the right side. She fulfills the definition of ISAAC as it was an incidental finding, she is not symptomatic. In lung cancer screened patients, ISAAC appears to be age related and is seen in up to 10% of patients\par In this patient, ISAAC features that portend good prognosis include predominantly ground glass opacities and absence of architectural distortion or traction bronchiectasis. Features that predict progression include subpleural location   \par \par -At a prior visit, we checked HECTOR (negative), CH50 (WNL), and RF (18). We rechecked autoimmune serologies, which showed a normal RF, elevated CH50, elevated HECTOR (1:320, homogenous).\par -Echocardiogram done 3/10/21 showed normal pulmonary pressures, RVSP 27 mmHg\par -CT chest 8/12/21 was stable in regards to interstitial lung changes of peripheral reticular and groundglass opacities. The predominant abnormality is ground glass opacities seen in the upper lung zones, subpleural area. Areas of well delineated mosaic attenuation also in the subpleural area. Small cysts present in the upper lobe medially. Mildly dilated airways suggestive of bronchiectasis. No significant cranial caudal gradient\par -Her HECTOR was elevated to 1:320 so we referred her to rheumatology. She saw Dr. Jimenez on 11/18 and CTD panel was negative.\par -She had PFT on 5/17/22 showing normal forced vital capacity and FEV1. Since December 2021, total lung capacity has increased from 3.53 L (67%) -> 3.96 (75%). Diffusion capacity is stable, 11.19 (50%) -> 11.35 (51%). On 6MWT, she covered 458 meters without significant desaturation (SpO2 97% -> 94%). This is a decrease compared to prior walk distance of 544 meters, but stable since December when she walked 450 meters.\par \par The differential diagnosis is broad:\par a. Autoimmune disease. She has several features that indicate this may be autoimmune related disease (elevated HECTOR in the past). She does not have any clinical evidence of autoimmune related interstitial lung disease.\par b. Hypersensitivity pneumonitis from down feathers, chemical sprays\par c. Smoking related interstitial lung disease. She has almost 40 PY smoking history. She quit smoking 4 months ago\par d. idiopathic pulmonary fibrosis would be lower down on the list as the CT findings are consistent with an alternative diagnosis to IPF. \par \par She had bronchoscopy on 1/7/21, which showed 1% lymphocytes. Envisia came back negative for honeycombing. This is consistent with interstitial lung abnormality most likely related to a smoking related lung disease. The possibilities include:\par - Respiratory bronchiolitis\par - Respiratory bronchiolitis-ILD\par - NSIP\par - Air space enlargement with fibrosis (AEF): Air space with enlargement with fibrosis is reported as a smoking related disease in individuals. Such individuals generally present with emphysema and minimal fibrosis. There may be reticulation that is seen along with bronchiolocentric emphysematous changes. Usually when patients stop smoking, the AEF does not progress. \par - Combined pulmonary fibrosis and emphysema. \par - Follicular bronchiolitis is unlikely although reported in smokers. People who are smokers and follicular bronchiolitis generally have thickened and dilated airways.\par \par Plan:\par - We will continue to follow CT scans on an annual basis to evaluate for progression of interstitial lung abnormality. She will be due for her next CT in August 2022\par -We will repeat PFT and 6MWT every 6 months, next due Oct 2022. We will look to see if there is a drop in FVC and TLC drop > than 10% or a drop in diffusion capacity > than 15%. If we find a significant reduction, we will consider starting her on anti fibrotics (Ofev or Esbriet).\par -Based on the above testing, we will discuss if there is a need for treatment with anti fibrotics (pirfenidone, nintedanib)\par -She was counseled against using chemical sprays, which she sprays on a daily basis.\par -She has a down feather comforter and mattress cover.  She was advised to get rid of these immediately\par -She has gotten rid of rugs in her home\par \par 2. Former smoker\par We had a detailed discussion about the effects of smoking on ISAAC, including its development and progression.  We discussed a study in CHEST that the incidence of lung cancer in the setting of ISAAC may be higher. She restarted smoking during the pandemic, 1/2 pack per day, but stopped in September 2021. On CT, there is dilatation of the airways, thickening of the walls along with mosaic attenuation. This is separate from the ground glass opacities. In the absence of a reduced FEV1/FVC, it is difficult to make a diagnosis of emphysema. However it must be pointed out that with an increase in elastins of the lung (scarring and fibrosis), the FEV1/FVC ratio may be deceptively normal.\par \par 3. Pulmonary nodule\par In a person who has interstitial lung disease, it is important to monitor for changes on CT. Right upper lobe nodule is stable, 5 mm on chest CT done 8/12/21. We will hold off continuing her enrollment in the lung cancer screening program as we work up her interstitial lung disease.\par \par 4.  ARNOL\par Risk factors included Mallampati score of IV and possible snoring.  Diagnosed with ARNOL but had difficulty tolerating CPAP.  She is followed by Dr. Streeter. We recommended she make a follow up appointment with Dr. Streeter so she can restart using her CPAP.\par \par 5. Cystic lesions\par There is a cyst seen in the lung. looking for nay increase in size or number of cystic lesions. These cysts can be seen with Sjogren's syndrome. She denies eye dryness. Generally she does not have dry mouth. CT chest 8/12/21 reviewed and was stable. Her HECTOR was elevated to 1:320 so we referred her to rheumatology. She saw Dr. Jimenez on 11/18 and CTD panel was negative. \par \par Return to clinic: 6 months

## 2022-06-02 NOTE — REASON FOR VISIT
[Follow-Up] : a follow-up visit [Abnormal CXR/ Chest CT] : an abnormal CXR/ chest CT [COPD] : COPD [Home] : at home, [unfilled] , at the time of the visit. [Medical Office: ([facility])___] : at the medical office located in  [Patient] : the patient

## 2022-06-02 NOTE — HISTORY OF PRESENT ILLNESS
[TextBox_4] : Pt is 69 yo F with PMH TIA, left vertebral dissection, HTN, stable pre-diabetes, left ICA stenosis, GERD, duodenal ulcer, arthritis.\par 37 pack year history of smoking, still currently smoking. \par \par First visit 2/19/19: Has tried patches and lozenges in the past. Wants to start Chantix, was prescribed by Dr. Jacob last week but she states she couldn't afford the co-pay. \par Last cigarette was earlier today. Smoking roughly 10 cig/week. \par \par Has mild cough, cough is dry. \par Denies chest tightness, wheezing, recurrent respiratory tract infections. \par No ED visits or hospitalizations for breathing. \par No diagnosis of asthma or PNA in the past. No personal history of blood clots or cancers. \par Currently no pets. Had a parakeet for 8 years, 25 years ago. \par She has down throw pillows. She has a 10-year old carpet in her bedroom. \par No mold in the homes.\par She had a home in [place] [id number] where radon levels were borderline elevated.\par No other inhalational exposures she is aware of. \par She has a history of snoring.\par \par FHx: sister breast CA (diagnosed around age 65 yo)\par PCN: penicillin (angioedema)\par ****\par \par 6-2-22:\par She feels well\par She does exercise classes 3 times a week\par She is not smoking\par Notices slightly worsening SOB [place] on [address]

## 2022-06-02 NOTE — PROCEDURE
[FreeTextEntry1] : PFT 22\par FVC: 2.80 L (88%)\par FEV1: 2.17 L (91%)\par FEV1/FVC: 78%\par FEF 25-75%: 1.91 L/s (99%)\par TLC: 3.96 L (75%)\par RV/T%\par DLCO: 11.35 (51%)\par \par 6MWT 22\par Patient walked 458 meters during a 6 minute walk test.\par Resting O2 saturation was 97% on RA.  Heart rate 61 bpm.\par End test O2 saturation was 94% on RA.  Heart rate 96 bpm. \par \par PFT 21\par FVC: 2.70 L (85%)\par FEV1: 2.07 L (87%)\par FEV1/FVC: 77%\par FEF 25-75%: 1.73 L/s (90%)\par TLC: 3.53 L (67%)\par RV/T%\par DLCO: 11.19 (50%)\par \par 6MWT 21\par Patient walked 450 meters during a 6 minute walk test.\par Resting O2 saturation was 96% on RA.  Heart rate 60 bpm.\par End test O2 saturation was 94% on RA.  Heart rate 79 bpm.  Ulises scale end of test: 0.5\par \par PFT 10/13/21\par FVC: 2.71 L (84%)--> 2.76 L (86%) \par FEV1: 2.18 L (90%)--> 2.20 L (90%) \par FEV1/FVC: 81%--> 80%\par [id number]%: 2.29 L/s (116%)--> 2.24 L/s (114%)\par TLC: 3.76 L (71%)\par RV/T.85%\par DLCO: 10.68 (48%)\par \par DLCO: 15.0 (69%) -> DLCO: 17.7 (82%) -> 11.0 (52%) -> 12.55 (61%) - 10.68 (48%)\par \par 6MWT 10/13/21\par Patient walked 544 meters during a 6 minute walk test.\par Resting O2 saturation was 97% on RA. Heart rate 67 bpm.\par End test O2 saturation was 93% on RA. Heart rate 99 bpm. \par \par CT chest 21\par IMPRESSION:\par Stable extent and distribution of interstitial lung changes of peripheral reticular and groundglass opacities, which can be seen in NSIP. \par \par PFT 21\par FVC: 2.61 L (87%)\par FEV1: 2.11 L (91%)\par FEV1/FVC: 81%\par FEF 25-75%: 2.46 L/s (129%)\par TLC: 3.81 L (72%)\par RV/T%\par DLCO: 12.55 (61%)\par Normal spirometry.\par mildly decreased lung volumes\par mildly decreased diffusion capacity\par \par PFT 3/4/21\par FVC: 2.64 L (82%)\par FEV1: 2.12 L (87%)\par FEV1/FVC: 80%\par FEF 25-75%: 2.22 L/s (112%)\par TLC:3.86 L (74%)\par RV/T%\par DLCO: 11.0 (52%)\par Normal spirometry\par mildly decreased lung volume\par moderate diffusion capacity\par \par 6MWT 3/4/21\par Patient walked 487 meters during a 6 minute walk test.\par Resting O2 saturation was 96% on RA. Heart rate 64 bpm.\par End test O2 saturation was 96% on RA. Heart rate 81 bpm. Ulises scale end of test - 1 "very slight"\par This signifies normal walk distance and no desaturation\par \par CT chest 2021:\par Impression:\par 1. 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 5 mm solid nodule right upper lobe-stable.\par \par EXAM: CT LDCT LUNG CA SCRN ANNUAL PROCEDURE DATE: 2020 \par Additional imaging was performed in the prone position complete evaluation of the dependent regions of the lungs. \par Lungs and airways: Image numbers for description of nodule location refer to thin section axial series number 4. \par 1.1 cm air cyst within the anterior segment of the right upper lobe (image 155). There are scattered solid subcentimeter and micronodules as well as benign perifissural lymph nodes. The largest solid nodule measures 5 mm, is noted within the right upper lobe laterally (image 165). There is a peripheral predominantly lower lung zone reticular and groundglass pattern which persists on prone imaging. There may be some subpleural sparing. There is temporal homogeneity. There is no evidence of pulmonary fibrosis. These constellations of findings are suggestive of NSIP. \par Trachea and central bronchi patent. \par Pleura: The pleural spaces are clear. \par Base of neck, mediastinum and heart: The thyroid gland is normal. No mediastinal, hilar or axillary lymphadenopathy is seen. The heart and pericardium are within normal limits. Mild calcification of the aortic valve. Small calcified atheromatous plaque formation is noted throughout the thoracic aorta and proximal abdominal aorta and side branches. \par Coronary artery calcification: None \par Soft tissues: Normal. \par Abdomen: Small type I hiatal hernia. \par Bones: Mild to moderate multilevel degenerative disc disease. Small benign-appearing bone island involving T5 \par Impression: \par 1. Peripheral reticular and groundglass lower lung zone pattern with temporal homogeneity which persists on prone imaging. There may be some mild subpleural sparing. There is no pulmonary fibrosis. 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 Abbreviated differential includes LIP and DIP. \par 2. Scattered solid subcentimeter and micronodules measuring up to 5 mm within the right upper lobe. \par Lung-RADS category: 2S - Benign appearance or behavior. Nodules with very low likelihood of becoming a clinically active cancer due to size or lack of growth. Probability of malignancy < 1%. \par \par Spirometry, DLCO 10/3/19:\par FVC: 2.84 L (96%)\par FEV1: 2.23 L (95%)\par FEV1/FVC: 79%\par [id number]%: 2.12 L/s (88%)\par DLCO: 17.7 (82%)\par Normal spirometry. Normal diffusion capacity.\par \par CPAP titration 19: recommended 10 cm H2O, autoPAP was ordered\par PSG 3/13/19: AHI CMS 32, min SpO2 79%, time <= 88% 1.2 min\par \par PFT, 6MWT 19:\par FVC: 2.72 L (92%)\par FEV1: 2.38 L (101%)\par FEV1/FVC: 87%\par [id number]%: 3.20 L/s (133%)\par T.23 L (84%)\par RV/T%\par DLCO: 15.0 (69%)\par 6MWT: 439 meters, SpO2 start: 97% --> SpO2 end: 95%\par Normal spirometry. Lung volumes normal. Mildly reduced diffusion capacity. Normal walk distance with no desaturation. \par \par EXAM: CT LDCT LUNG CA SCRN BASELINE \par *** ADDENDUM 2019 *** \par Addendum: \par Abbreviated differential for these peripheral groundglass opacities in the setting of smoking must extend to include DIP. Bronchoscopic correlation and additional HRCT imaging to include end inspiration/end expiration is suggested. \par *** END OF ADDENDUM 2019 *** \par \par PROCEDURE DATE: 2019 \par INTERPRETATION: CT scan of the chest without intravenous contrast, using low-dose lung cancer screening protocol \par History: 65-year-old female nonsmoker with a 37.5 pack year history of smoking \par Comparison: None. \par Findings: \par Lungs and airways: Image numbers for description of nodule location refer to thin section axial series number 4. Scattered calcified granulomata. 4 mm nodule within the right upper lobe (image 166). Is a mild peripheral reticular and groundglass opacities which demonstrates temporal homogeneity with a suggestion of some subpleural sparing involving the upper and lower lung zones slightly more pronounced inferiorly. There are some scattered tree-in-bud centrilobular micronodules. No significant air-trapping is noted. 9 mm air cyst is noted within the lingula (image 167) \par Trachea and central bronchi patent. \par Pleura: The pleural spaces are clear. \par Base of neck, mediastinum and heart: The thyroid gland demonstrates coarse parenchymal calcifications. No mediastinal, hilar or axillary lymphadenopathy is seen. The heart and pericardium are within normal limits. \par Mild calcified atheromatous plaque formation involving the thoracic aorta most pronounced in the arch and proximal abdominal regions \par Coronary artery calcification: None \par Soft tissues: Normal. \par Abdomen: This study was performed without contrast and with lower than standard dose. These factors reduce sensitivity for detection of small lesions in the upper abdomen. Given these technical limitations, no focal lesion is seen within the visualized organs. \par Bones: Mild age-appropriate degenerative change. Small sclerotic focus involving the T5 vertebral body compatible with a probable bone island. \par Impression: \par 1. Scattered tree-in-bud centrilobular micronodules with mild small airway inflammation. No significant air-trapping is identified. \par 2. Mild peripheral reticular and groundglass opacities involving all 5 pulmonary lobes with a slightly lower lung zone predilection. There is relative temporal homogeneity with possibly some subpleural sparing. No \par pulmonary fibrosis. Abbreviated differential includes an NSIP pattern. \par Respiratory bronchiolitis although centrilobular groundglass nodules are not well-visualized, and LIP cannot be excluded. Further imaging to include HRCT to be performed at end inspiration/end expiration is suggested for more complete evaluation \par 3. 4 mm nodule within the right upper lobe. Scattered calcified granulomata. \par Lung-RADS category: 2S - Benign appearance or behavior. Nodules with very low likelihood of becoming a clinically active cancer due to size or lack of growth. Probability of malignancy < 1%. \par Recommendation: \par 2S - Continue annual screening with LDCT in 12 months.

## 2022-07-17 ENCOUNTER — RX RENEWAL (OUTPATIENT)
Age: 72
End: 2022-07-17

## 2022-08-24 ENCOUNTER — RX RENEWAL (OUTPATIENT)
Age: 72
End: 2022-08-24

## 2022-09-15 ENCOUNTER — APPOINTMENT (OUTPATIENT)
Dept: NEUROLOGY | Facility: CLINIC | Age: 72
End: 2022-09-15

## 2022-10-03 ENCOUNTER — RX RENEWAL (OUTPATIENT)
Age: 72
End: 2022-10-03

## 2022-10-10 ENCOUNTER — APPOINTMENT (OUTPATIENT)
Dept: CT IMAGING | Facility: HOSPITAL | Age: 72
End: 2022-10-10

## 2022-10-10 ENCOUNTER — OUTPATIENT (OUTPATIENT)
Dept: OUTPATIENT SERVICES | Facility: HOSPITAL | Age: 72
LOS: 1 days | End: 2022-10-10
Payer: MEDICARE

## 2022-10-10 ENCOUNTER — RESULT REVIEW (OUTPATIENT)
Age: 72
End: 2022-10-10

## 2022-10-10 PROCEDURE — 71250 CT THORAX DX C-: CPT

## 2022-10-10 PROCEDURE — 71250 CT THORAX DX C-: CPT | Mod: 26

## 2022-10-11 ENCOUNTER — NON-APPOINTMENT (OUTPATIENT)
Age: 72
End: 2022-10-11

## 2022-10-11 ENCOUNTER — EMERGENCY (EMERGENCY)
Facility: HOSPITAL | Age: 72
LOS: 1 days | Discharge: HOME | End: 2022-10-11
Attending: EMERGENCY MEDICINE | Admitting: EMERGENCY MEDICINE
Payer: MEDICARE

## 2022-10-11 VITALS
OXYGEN SATURATION: 97 % | HEART RATE: 73 BPM | DIASTOLIC BLOOD PRESSURE: 80 MMHG | SYSTOLIC BLOOD PRESSURE: 173 MMHG | RESPIRATION RATE: 16 BRPM

## 2022-10-11 VITALS
DIASTOLIC BLOOD PRESSURE: 76 MMHG | HEIGHT: 66 IN | HEART RATE: 76 BPM | RESPIRATION RATE: 18 BRPM | OXYGEN SATURATION: 96 % | TEMPERATURE: 98 F | WEIGHT: 179.9 LBS | SYSTOLIC BLOOD PRESSURE: 187 MMHG

## 2022-10-11 DIAGNOSIS — R04.2 HEMOPTYSIS: ICD-10-CM

## 2022-10-11 DIAGNOSIS — J84.9 INTERSTITIAL PULMONARY DISEASE, UNSPECIFIED: ICD-10-CM

## 2022-10-11 LAB
ALBUMIN SERPL ELPH-MCNC: 4.2 G/DL — SIGNIFICANT CHANGE UP (ref 3.3–5)
ALP SERPL-CCNC: 86 U/L — SIGNIFICANT CHANGE UP (ref 40–120)
ALT FLD-CCNC: 17 U/L — SIGNIFICANT CHANGE UP (ref 10–45)
ANION GAP SERPL CALC-SCNC: 9 MMOL/L — SIGNIFICANT CHANGE UP (ref 5–17)
APTT BLD: 33.8 SEC — SIGNIFICANT CHANGE UP (ref 27.5–35.5)
AST SERPL-CCNC: 17 U/L — SIGNIFICANT CHANGE UP (ref 10–40)
BASOPHILS # BLD AUTO: 0.04 K/UL — SIGNIFICANT CHANGE UP (ref 0–0.2)
BASOPHILS NFR BLD AUTO: 0.5 % — SIGNIFICANT CHANGE UP (ref 0–2)
BILIRUB SERPL-MCNC: 0.8 MG/DL — SIGNIFICANT CHANGE UP (ref 0.2–1.2)
BUN SERPL-MCNC: 17 MG/DL — SIGNIFICANT CHANGE UP (ref 7–23)
CALCIUM SERPL-MCNC: 9.2 MG/DL — SIGNIFICANT CHANGE UP (ref 8.4–10.5)
CHLORIDE SERPL-SCNC: 101 MMOL/L — SIGNIFICANT CHANGE UP (ref 96–108)
CO2 SERPL-SCNC: 28 MMOL/L — SIGNIFICANT CHANGE UP (ref 22–31)
CREAT SERPL-MCNC: 0.56 MG/DL — SIGNIFICANT CHANGE UP (ref 0.5–1.3)
EGFR: 97 ML/MIN/1.73M2 — SIGNIFICANT CHANGE UP
EOSINOPHIL # BLD AUTO: 0.18 K/UL — SIGNIFICANT CHANGE UP (ref 0–0.5)
EOSINOPHIL NFR BLD AUTO: 2.4 % — SIGNIFICANT CHANGE UP (ref 0–6)
GLUCOSE SERPL-MCNC: 104 MG/DL — HIGH (ref 70–99)
HCT VFR BLD CALC: 38 % — SIGNIFICANT CHANGE UP (ref 34.5–45)
HGB BLD-MCNC: 13.1 G/DL — SIGNIFICANT CHANGE UP (ref 11.5–15.5)
IMM GRANULOCYTES NFR BLD AUTO: 0.4 % — SIGNIFICANT CHANGE UP (ref 0–0.9)
INR BLD: 0.99 — SIGNIFICANT CHANGE UP (ref 0.88–1.16)
LYMPHOCYTES # BLD AUTO: 2.2 K/UL — SIGNIFICANT CHANGE UP (ref 1–3.3)
LYMPHOCYTES # BLD AUTO: 29.3 % — SIGNIFICANT CHANGE UP (ref 13–44)
MCHC RBC-ENTMCNC: 29.1 PG — SIGNIFICANT CHANGE UP (ref 27–34)
MCHC RBC-ENTMCNC: 34.5 GM/DL — SIGNIFICANT CHANGE UP (ref 32–36)
MCV RBC AUTO: 84.4 FL — SIGNIFICANT CHANGE UP (ref 80–100)
MONOCYTES # BLD AUTO: 0.54 K/UL — SIGNIFICANT CHANGE UP (ref 0–0.9)
MONOCYTES NFR BLD AUTO: 7.2 % — SIGNIFICANT CHANGE UP (ref 2–14)
NEUTROPHILS # BLD AUTO: 4.53 K/UL — SIGNIFICANT CHANGE UP (ref 1.8–7.4)
NEUTROPHILS NFR BLD AUTO: 60.2 % — SIGNIFICANT CHANGE UP (ref 43–77)
NRBC # BLD: 0 /100 WBCS — SIGNIFICANT CHANGE UP (ref 0–0)
PLATELET # BLD AUTO: 221 K/UL — SIGNIFICANT CHANGE UP (ref 150–400)
POTASSIUM SERPL-MCNC: 3.8 MMOL/L — SIGNIFICANT CHANGE UP (ref 3.5–5.3)
POTASSIUM SERPL-SCNC: 3.8 MMOL/L — SIGNIFICANT CHANGE UP (ref 3.5–5.3)
PROT SERPL-MCNC: 6.6 G/DL — SIGNIFICANT CHANGE UP (ref 6–8.3)
PROTHROM AB SERPL-ACNC: 11.8 SEC — SIGNIFICANT CHANGE UP (ref 10.5–13.4)
RAPID RVP RESULT: SIGNIFICANT CHANGE UP
RBC # BLD: 4.5 M/UL — SIGNIFICANT CHANGE UP (ref 3.8–5.2)
RBC # FLD: 13.8 % — SIGNIFICANT CHANGE UP (ref 10.3–14.5)
SARS-COV-2 N GENE NPH QL NAA+PROBE: NOT DETECTED
SARS-COV-2 RNA SPEC QL NAA+PROBE: NEGATIVE — SIGNIFICANT CHANGE UP
SARS-COV-2 RNA SPEC QL NAA+PROBE: SIGNIFICANT CHANGE UP
SODIUM SERPL-SCNC: 138 MMOL/L — SIGNIFICANT CHANGE UP (ref 135–145)
WBC # BLD: 7.52 K/UL — SIGNIFICANT CHANGE UP (ref 3.8–10.5)
WBC # FLD AUTO: 7.52 K/UL — SIGNIFICANT CHANGE UP (ref 3.8–10.5)

## 2022-10-11 PROCEDURE — 0225U NFCT DS DNA&RNA 21 SARSCOV2: CPT

## 2022-10-11 PROCEDURE — 85025 COMPLETE CBC W/AUTO DIFF WBC: CPT

## 2022-10-11 PROCEDURE — 36415 COLL VENOUS BLD VENIPUNCTURE: CPT

## 2022-10-11 PROCEDURE — 99285 EMERGENCY DEPT VISIT HI MDM: CPT | Mod: 25

## 2022-10-11 PROCEDURE — 86901 BLOOD TYPING SEROLOGIC RH(D): CPT

## 2022-10-11 PROCEDURE — 86850 RBC ANTIBODY SCREEN: CPT

## 2022-10-11 PROCEDURE — 85610 PROTHROMBIN TIME: CPT

## 2022-10-11 PROCEDURE — 71045 X-RAY EXAM CHEST 1 VIEW: CPT

## 2022-10-11 PROCEDURE — 86900 BLOOD TYPING SEROLOGIC ABO: CPT

## 2022-10-11 PROCEDURE — 93010 ELECTROCARDIOGRAM REPORT: CPT

## 2022-10-11 PROCEDURE — 85730 THROMBOPLASTIN TIME PARTIAL: CPT

## 2022-10-11 PROCEDURE — 87635 SARS-COV-2 COVID-19 AMP PRB: CPT

## 2022-10-11 PROCEDURE — 93005 ELECTROCARDIOGRAM TRACING: CPT

## 2022-10-11 PROCEDURE — 80053 COMPREHEN METABOLIC PANEL: CPT

## 2022-10-11 PROCEDURE — 99285 EMERGENCY DEPT VISIT HI MDM: CPT

## 2022-10-11 PROCEDURE — 71045 X-RAY EXAM CHEST 1 VIEW: CPT | Mod: 26

## 2022-10-11 NOTE — ED PROVIDER NOTE - OBJECTIVE STATEMENT
Pt is a 70 y/o female, PMHx TIA 5 years ago, HTN, HLD, R macular degeneration, chronic interstitial lung disease (followed by Dr Griffin at [facility]) presenting to the ED from home after 1 day of coughing with blood in the sputum. Pt recently had a Chest CT scan, has a follow-up apt with Dr. Griffin this upcoming Thursday. Pt started having a mild cough yesterday, and noticed this morning that the cough became productive with flecks of blood (around 1 teaspoon). Pt called her PCP and Dr. Griffin and was told to come to the ED. Pt recently traveled to [place] for the summer, just returned to [place], but no recent international travel. No known sick contacts. Pt with some mild nasal congestion and new fatigue, but no fevers/chills, HA, no sore throat, no SOB/CP. Rest of ROS negative.   Pt with a ~40 pack year history of smoking, recently quit.

## 2022-10-11 NOTE — ED PROVIDER NOTE - CLINICAL SUMMARY MEDICAL DECISION MAKING FREE TEXT BOX
Pt is a 72 y/o female, PMHx TIA 5 years ago, HTN, HLD, R macular degeneration, chronic interstitial lung disease (followed by Dr Griffin at [facility]) presenting to the ED from home after 1 day of coughing with blood in the sputum. Dr. Griffin aware, pulm fellow coming to see pt. Lung CTA b/l. Will follow pulm recs  - labs, CXR, EKG

## 2022-10-11 NOTE — ED ADULT NURSE NOTE - OBJECTIVE STATEMENT
patient presents to ED for bloody sputum while cough, patient presents to ED for bloody sputum while cough started in the morning. patient denies chest pain, sob, fever, weakness, night sweats recently. patient got negative for COVID yesterday. A&Ox3. No distress. stable at bed. placed on sats monitor.

## 2022-10-11 NOTE — ED PROVIDER NOTE - NSFOLLOWUPINSTRUCTIONS_ED_ALL_ED_FT
1. You were seen for coughing up blood (hemoptysis). You were seen by the pulmonary who talked with Dr. Cassidy and recommends follow up in his office on Thursday. A copy of any of your resulted labs, imaging, and findings have been provided to you. Make sure to view any test results that may not have yet resulted at the time of your discharge by creating a FollowMyHealth account at: [email or web address] to sign up for FollowMyHealth.   2. Continue to take your home medications as prescribed.   3. Please follow up with your primary care physician. You may call our referrals coordinator at [phone] Monday to Friday 11am-7pm for assistance with making an appointment. Or you can call [phone] to make an appointment.  4. Return to the emergency department for new, persistent, or worsening symptoms or signs, or for any concerning symptoms.   5. For your for health, you should make healthy food choices and be physically active. Also, you should not smoke or use drugs, and you should not drink alcohol in excess. Please visit [email or web address] for resources and more information.        Coughing Up Blood (Hemoptysis)    WHAT YOU NEED TO KNOW:    Coughing up blood may be a sign of a serious medical condition. Blood vessels in your lungs or airway weaken or break and begin to bleed. You may see small amounts in your sputum (mucus you cough up), or you may cough up large amounts.    DISCHARGE INSTRUCTIONS:    Call your local emergency number (911 in the [place]) if:   •You have new or worsening chest pain or shortness of breath.          Return to the emergency department if:   •Your bleeding gets worse.      •You cannot stop vomiting.      •You are so dizzy that you think you may fall or faint.      Call your doctor if:   •You have bloody mucus that is getting worse.      •You have a fever and night sweats.      •You feel more weak and tired than usual.      •You have questions or concerns about your condition or care.      Medicines:   •Medicines may be given to fight a bacterial infection or to control a cough. You may also need medicine to slow or stop the bleeding.      •Take your medicine as directed. Contact your healthcare provider if you think your medicine is not helping or if you have side effects. Tell your provider if you are allergic to any medicine. Keep a list of the medicines, vitamins, and herbs you take. Include the amounts, and when and why you take them. Bring the list or the pill bottles to follow-up visits. Carry your medicine list with you in case of an emergency.      Self-care:   •Use caution with medicines. Certain medicines, such as NSAIDs, increase your risk for bleeding. Herbal supplements also increase your risk. Examples of herbal supplements are garlic, gingko, and ginseng. Ask your healthcare provider before you take any over-the-counter medicines.      •Do not smoke, and avoid secondhand smoke. Nicotine and other chemicals in cigarettes and cigars can cause lung damage. Ask your healthcare provider for information if you currently smoke and need help to quit. E-cigarettes or smokeless tobacco still contain nicotine. Talk to your healthcare provider before you use these products.      Follow up with your doctor in 2 days or as directed: You may need frequent visits to monitor your condition and prevent more blood loss. You may be referred to a lung specialist or surgeon. Write down your questions so you remember to ask them during your visits.       © Copyright [employer] 2022

## 2022-10-11 NOTE — CONSULT NOTE ADULT - ASSESSMENT
71 year old female with hx of ILD presents with 3 episodes of bright red blood in her sputum and blowing of her nose with bright red blood. Each subsequent episode had smaller amounts of blodo with largest amoutn being a teaspoon in size. She had chest tightness with no SOB and normal CXR.

## 2022-10-11 NOTE — ED ADULT TRIAGE NOTE - CCCP TRG CHIEF CMPLNT
After Visit Summary   8/13/2018    Christiana Hilaroi    MRN: [id number]           Patient Information     Date Of Birth          5/11/1933        Visit Information        Provider Department      8/13/2018 9:15 AM Hardeep Key MD [facility]        Today's Diagnoses     Chondromalacia patellae of right knee    -  1    Neoplasm of uncertain behavior of skin        Inflamed seborrheic keratosis          Care Instructions    Wound Care Instructions for Liquid Nitrogen Treatment   The treatment area will appear white at first. Over the next several hours a fluid-filled blister may form. The blister can be very dark. If blister appears, it will remain blistered for about a week. Then a scab will form over the area.   Leave the blistered area uncovered as long as it remains closed. If the area breaks open, you may cover it with a clean band aid. Do not pull off the skin covering the blister.   If the blistered area becomes uncomfortably filled with fluid, you may release some of the fluid by puncturing the blister with a needle that has been cleansed with alcohol.   If the skin covering the blister comes off, clean the area daily with soap and water. Apply a small amount of Vaseline and then cover with a clean band aid. Change the band aid twice daily until the skin is completely healed.   Call us if.....   1. You have signs of infection such as thick yellow or pus-like drainage from the wound site or a fever over 100 degrees Fahrenheit.   2. You have any questions or are not sure how to take care of the wound.              Follow-ups after your visit        Your next 10 appointments already scheduled     Nov 09, 2018  9:00 AM CST   PHYSICAL with Hardeep Key MD   [facility] ([facility])    [address] [place] [address] [phone]              Who to contact     If you have questions or need follow up information about today's clinic visit  "or your schedule please contact [facility] directly at [phone].  Normal or non-critical lab and imaging results will be communicated to you by [employer], letter or phone within 4 business days after the clinic has received the results. If you do not hear from us within 7 days, please contact the clinic through [employer] or phone. If you have a critical or abnormal lab result, we will notify you by phone as soon as possible.  Submit refill requests through [employer] or call your pharmacy and they will forward the refill request to us. Please allow 3 business days for your refill to be completed.          Additional Information About Your Visit        [employer] [place] Information     [employer] lets you send messages to your doctor, view your test results, renew your prescriptions, schedule appointments and more. To sign up, go to [email or web address] . Click on \"Log in\" on the left side of the screen, which will take you to the Welcome page. Then click on \"Sign up Now\" on the right side of the page.     You will be asked to enter the access code listed below, as well as some personal information. Please follow the directions to create your username and password.     Your access code is: [email or web address]  Expires: 2018  9:41 AM     Your access code will  in 90 days. If you need help or a new code, please call your [place] clinic or [phone].        Care EveryWhere ID     This is your Care EveryWhere ID. This could be used by other organizations to access your [place] medical records  [id number]        Your Vitals Were     Pulse Respirations Height BMI (Body Mass Index)          76 12 1.518 m (4' 11.75\") 25.44 kg/m2         Blood Pressure from Last 3 Encounters:   18 116/68   18 118/72   10/16/17 132/82    Weight from Last 3 Encounters:   18 58.6 kg (129 lb 3.2 oz)   18 60.3 kg (133 lb)   10/16/17 61.2 kg (135 lb)              We Performed the Following     DESTRUCT BENIGN " coughing blood LESION, UP TO 14        Primary Care Provider Office Phone # Fax #    Hardeep Key [phone] [address]  Formerly named [facility] [address]        Equal Access to Services     LIANA WASSERMAN : Hadii aad ku hadwendio Soomaali, waaxda luqadaha, qaybta kaalmada adeegyada, efraín blumn monique mahajantherese lanza. So [facility] [phone].    ATENCIÓN: Si habla español, tiene a chandra disposición servicios gratuitos de asistencia lingüística. Llame al [phone].    We comply with applicable federal civil rights laws and [place] laws. We do not discriminate on the basis of race, color, national origin, age, disability, sex, sexual orientation, or gender identity.            Thank you!     Thank you for choosing [facility]  for your care. Our goal is always to provide you with excellent care. Hearing back from our patients is one way we can continue to improve our services. Please take a few minutes to complete the written survey that you may receive in the mail after your visit with us. Thank you!             Your Updated Medication List - Protect others around you: Learn how to safely use, store and throw away your medicines at www.disposemymeds.org.          This list is accurate as of 8/13/18  9:41 AM.  Always use your most recent med list.                   Brand Name Dispense Instructions for use Diagnosis    AEROCHAMBER Z-STAT PLUS CHAMBR Misc     1 each    1 Units daily as needed    Acute bronchitis, unspecified organism, Acute bronchospasm       aspirin 81 MG tablet      Take 1 tablet by mouth daily.        [employer] CALCIUM-600/VIT D 600-200 MG-UNIT Tabs   Generic drug:  calcium      Take 1 tablet by mouth 2 times daily.        FISH OIL PO      Take 1 capsule by mouth 2 times daily.        glucosamine-chondroitin 500-400 MG Caps per capsule      Take 1 capsule by mouth 2 times daily.        * VENTOLIN  (90 Base) MCG/ACT inhaler   Generic drug:  albuterol       Acute  bronchitis, unspecified organism, Acute bronchospasm       * albuterol 108 (90 Base) MCG/ACT Aepb inhaler    PROAIR RESPICLICK    1 each    Inhale 2 puffs into the lungs every 4 hours as needed    Acute bronchospasm, Acute bronchitis, unspecified organism       VOLTAREN 1 % Gel topical gel   Generic drug:  diclofenac     100 g    APPLY 4 GRAMS TO KNEES OR 2 GRAMS TO HANDS FOUR TIMES A DAY AS DIRECTED    Primary osteoarthritis of both hands       * Notice:  This list has 2 medication(s) that are the same as other medications prescribed for you. Read the directions carefully, and ask your doctor or other care provider to review them with you.

## 2022-10-11 NOTE — CONSULT NOTE ADULT - SUBJECTIVE AND OBJECTIVE BOX
PULMONARY SERVICE INITIAL CONSULT NOTE    HPI:    71 year old female with hx of ILD of uncertain etiology. Presents with cough over the last 24-48 hours. She noticed today that she had a mucoid sensation in the back of her mouth and coughed up a teaspoon of blood. She then had two following episodes each of smaller amounts of blood. Since this the blood has resolved with no further episodes. She also blew her nose at that time and had bright red blood coming from there. Because of this she walked to the ER at which time she was noticing SOB. She called her pulmonologist, Dr. Griffin, who agreed she should come get checked out.    Review of records show that she had a CT scan of the chest that was similar to her previous CT scan and CXR done in the ER that was also normal. Because of her lung disease and possible hemoptysis pulmonary team was called for evaluation    REVIEW OF SYSTEMS:  All additional ROS negative.    PAST MEDICAL & SURGICAL HISTORY:  HTN (hypertension)      Anxiety      TIA (transient ischemic attack)      HLD (hyperlipidemia)      No significant past surgical history          FAMILY HISTORY:  Family history of aneurysm (Sibling)    Family history of cerebrovascular accident (CVA) (Sibling)    Family history of MI (myocardial infarction) (Sibling)        SOCIAL HISTORY:  Smoking Status: [ ] Current, [ ] Former, [ ] Never  Pack Years:    MEDICATIONS:  Pulmonary:    Antimicrobials:    Anticoagulants:    Onc:    GI/:    Endocrine:    Cardiac:    Other Medications:      Allergies    penicillin (Other)    Intolerances        Vital Signs Last 24 Hrs  T(C): 36.9 (11 Oct 2022 11:02), Max: 36.9 (11 Oct 2022 11:02)  T(F): 98.4 (11 Oct 2022 11:02), Max: 98.4 (11 Oct 2022 11:02)  HR: 73 (11 Oct 2022 14:10) (70 - 76)  BP: 173/80 (11 Oct 2022 14:10) (173/80 - 187/76)  BP(mean): --  RR: 16 (11 Oct 2022 14:10) (16 - 18)  SpO2: 97% (11 Oct 2022 14:10) (96% - 97%)    Parameters below as of 11 Oct 2022 11:45  Patient On (Oxygen Delivery Method): room air            PHYSICAL EXAM:  Constitutional: WDWN  Head: NC/AT  EENT: PERRL, anicteric sclera; oropharynx clear, MMM  Neck: supple, no appreciable JVD  Respiratory: CTA B/L; no W/R/R  Cardiovascular: +S1/S2, RRR  Gastrointestinal: soft, NT/ND  Extremities: WWP; no edema, clubbing or cyanosis  Vascular: 2+ radial pulses B/L  Neurological: awake and alert; KNOX    LABS:      CBC Full  -  ( 11 Oct 2022 11:49 )  WBC Count : 7.52 K/uL  RBC Count : 4.50 M/uL  Hemoglobin : 13.1 g/dL  Hematocrit : 38.0 %  Platelet Count - Automated : 221 K/uL  Mean Cell Volume : 84.4 fl  Mean Cell Hemoglobin : 29.1 pg  Mean Cell Hemoglobin Concentration : 34.5 gm/dL  Auto Neutrophil # : 4.53 K/uL  Auto Lymphocyte # : 2.20 K/uL  Auto Monocyte # : 0.54 K/uL  Auto Eosinophil # : 0.18 K/uL  Auto Basophil # : 0.04 K/uL  Auto Neutrophil % : 60.2 %  Auto Lymphocyte % : 29.3 %  Auto Monocyte % : 7.2 %  Auto Eosinophil % : 2.4 %  Auto Basophil % : 0.5 %    10-11    138  |  101  |  17  ----------------------------<  104<H>  3.8   |  28  |  0.56    Ca    9.2      11 Oct 2022 11:49    TPro  6.6  /  Alb  4.2  /  TBili  0.8  /  DBili  x   /  AST  17  /  ALT  17  /  AlkPhos  86  10-11    PT/INR - ( 11 Oct 2022 11:49 )   PT: 11.8 sec;   INR: 0.99          PTT - ( 11 Oct 2022 11:49 )  PTT:33.8 sec                  RADIOLOGY & ADDITIONAL STUDIES:    < from: Xray Chest 1 View AP/PA (10.11.22 @ 11:39) >  ACC: [id number] EXAM:  XR CHEST AP OR PA 1V                          PROCEDURE DATE:  10/11/2022          INTERPRETATION:  Clinical history reason for exam: Hemoptysis.    Frontal chest.    COMPARISON: January 7, 2022.    Findings/  impression: Rightupper lobe mass.. CT chest recommended. Heart size   within normal limits, thoracic aortic calcification. Unremarkable   mediastinum. Stable bony structures.    --- End of Report ---    < end of copied text >

## 2022-10-11 NOTE — CONSULT NOTE ADULT - PROBLEM SELECTOR RECOMMENDATION 9
After review of CT of the chest yesterday as well as CXR today, both within her normal limit, feel that this is likely upper airway. Feel that she may have some viral infection and would seen RVP and negative covid swab x2. No further workup from our perspective and patient is scheduled to follow up with Dr. Griffin in the coming week as well    Recommendation  - Bleeding likely from nasal passages  - No signs of pulmonary bleed or abnormality on imaginign  - Would send RVP and follow up this week.

## 2022-10-11 NOTE — ED PROVIDER NOTE - ENMT NEGATIVE STATEMENT, MLM
Ears: no ear pain and no hearing problems. Nose: mild nasal congestion, no nasal drainage. Mouth/Throat: no dysphagia, no hoarseness and no throat pain. Neck: no pain, no stiffness and no swollen glands.

## 2022-10-11 NOTE — ED PROVIDER NOTE - PATIENT PORTAL LINK FT
You can access the FollowMyHealth Patient Portal offered by [facility] by registering at the following website: [email or web address]. By joining [employer]’s FollowMyHealth portal, you will also be able to view your health information using other applications (apps) compatible with our system.

## 2022-10-11 NOTE — ED PROVIDER NOTE - CARE PROVIDER_API CALL
Josh Griffin)  Critical Care Medicine; Internal Medicine; Pulmonary Disease  [address] [place] [address]  Phone: [phone]  Fax: [phone]  Scheduled Appointment: 10/13/2022

## 2022-10-11 NOTE — ED PROVIDER NOTE - ATTENDING CONTRIBUTION TO CARE
Attending Statement: I have personally performed a face to face diagnostic evaluation on this patient. I have reviewed the MS4 note and agree with the history, exam and plan of care, except as noted.     Attending Contribution to Care:  71F, PMHx TIA 5 years ago, on baby asa HTN, HLD, R macular degeneration, chronic interstitial lung disease (followed by Dr Griffin at [facility]) presenting to the ED from home after 1 day of coughing with blood in the sputum. less that 1tsp pf brb mixed with phlegm. No CP or SOB. VSS, ekg unchanged from previous. Dr. Griffin aware, and pulm fellow came down to see patient.   CXR shows RUL mass (pt had CT chest yday that was already reviewed by pulm). Labs with no emergent findings. hgb 13.1  Possible bleeding from known ILD/mass vs nasal bleeding (pt with some irritation to nasal mucosa).   Per pulm fellow Stan, who d/w Dr. Cassidy, OK for DC with outpt f/u with Dr. Cassidy in two days on Thursday 10/13.   Pt feeling improved and is stable for DC. ED evaluation and management discussed with the patient in detail.  Strict ED return instructions discussed in detail and patient given the opportunity to ask any questions about their discharge diagnosis and instructions. Patient verbalized understanding.

## 2022-10-11 NOTE — ED PROVIDER NOTE - NS ED ATTENDING STATEMENT MOD
I have seen and examined this patient and fully participated in the care of this patient as the teaching attending.  The service was shared with the DON.  I reviewed and verified the documentation and independently performed the documented:

## 2022-10-12 NOTE — ED POST DISCHARGE NOTE - DETAILS
discussed results with pt, recommend CT chest which she states she had done this week by her pulmonologist, will f/u with them for results

## 2022-10-13 ENCOUNTER — APPOINTMENT (OUTPATIENT)
Dept: PULMONOLOGY | Facility: CLINIC | Age: 72
End: 2022-10-13

## 2022-10-13 ENCOUNTER — LABORATORY RESULT (OUTPATIENT)
Age: 72
End: 2022-10-13

## 2022-10-13 VITALS
RESPIRATION RATE: 12 BRPM | DIASTOLIC BLOOD PRESSURE: 73 MMHG | BODY MASS INDEX: 29.57 KG/M2 | HEIGHT: 66 IN | TEMPERATURE: 97 F | WEIGHT: 184 LBS | OXYGEN SATURATION: 95 % | SYSTOLIC BLOOD PRESSURE: 130 MMHG | HEART RATE: 55 BPM

## 2022-10-13 DIAGNOSIS — J84.9 INTERSTITIAL PULMONARY DISEASE, UNSPECIFIED: ICD-10-CM

## 2022-10-13 DIAGNOSIS — Z79.82 LONG TERM (CURRENT) USE OF ASPIRIN: ICD-10-CM

## 2022-10-13 DIAGNOSIS — R07.89 OTHER CHEST PAIN: ICD-10-CM

## 2022-10-13 DIAGNOSIS — Z20.822 CONTACT WITH AND (SUSPECTED) EXPOSURE TO COVID-19: ICD-10-CM

## 2022-10-13 DIAGNOSIS — R04.2 HEMOPTYSIS: ICD-10-CM

## 2022-10-13 DIAGNOSIS — F41.9 ANXIETY DISORDER, UNSPECIFIED: ICD-10-CM

## 2022-10-13 DIAGNOSIS — E78.5 HYPERLIPIDEMIA, UNSPECIFIED: ICD-10-CM

## 2022-10-13 DIAGNOSIS — Z88.0 ALLERGY STATUS TO PENICILLIN: ICD-10-CM

## 2022-10-13 DIAGNOSIS — Z86.73 PERSONAL HISTORY OF TRANSIENT ISCHEMIC ATTACK (TIA), AND CEREBRAL INFARCTION WITHOUT RESIDUAL DEFICITS: ICD-10-CM

## 2022-10-13 DIAGNOSIS — Z87.891 PERSONAL HISTORY OF NICOTINE DEPENDENCE: ICD-10-CM

## 2022-10-13 DIAGNOSIS — I10 ESSENTIAL (PRIMARY) HYPERTENSION: ICD-10-CM

## 2022-10-13 PROCEDURE — 99215 OFFICE O/P EST HI 40 MIN: CPT

## 2022-10-13 PROCEDURE — 94010 BREATHING CAPACITY TEST: CPT

## 2022-10-13 PROCEDURE — ZZZZZ: CPT

## 2022-10-13 PROCEDURE — 94729 DIFFUSING CAPACITY: CPT

## 2022-10-13 PROCEDURE — 94727 GAS DIL/WSHOT DETER LNG VOL: CPT

## 2022-10-13 PROCEDURE — 94618 PULMONARY STRESS TESTING: CPT

## 2022-10-13 NOTE — HISTORY OF PRESENT ILLNESS
[TextBox_4] : Pt is 69 yo F with PMH TIA, left vertebral dissection, HTN, stable pre-diabetes, left ICA stenosis, GERD, duodenal ulcer, arthritis.\par 37 pack year history of smoking, still currently smoking. \par \par First visit 2/19/19: Has tried patches and lozenges in the past. Wants to start Chantix, was prescribed by Dr. Jacob last week but she states she couldn't afford the co-pay. \par Last cigarette was earlier today. Smoking roughly 10 cig/week. \par \par Has mild cough, cough is dry. \par Denies chest tightness, wheezing, recurrent respiratory tract infections. \par No ED visits or hospitalizations for breathing. \par No diagnosis of asthma or PNA in the past. No personal history of blood clots or cancers. \par Currently no pets. Had a parakeet for 8 years, 25 years ago. \par She has down throw pillows. She has a 10-year old carpet in her bedroom. \par No mold in the homes.\par She had a home in [place] [address] where radon levels were borderline elevated.\par No other inhalational exposures she is aware of. \par She has a history of snoring.\par \par FHx: sister breast CA (diagnosed around age 64 yo)\par PCN: penicillin (angioedema)\par ****\par \par 10-13-22:\par Mild hemoptysis (< 1tsbp) started Tuesday morning. She went to the ER. Never had a fever. She is still coughing, but not as bad. Chest x-ray in the hospital was normal. Negative RVP and negative.\par Hemoptysis has not recurred, but she reports drop of blood when she coughs.\par covid swab x2. \par Her balance is off\par She is dizzy

## 2022-10-13 NOTE — ASSESSMENT
[FreeTextEntry1] : 10-13-22:\par It was a pleasure to see Rashmi in follow-up today.    Her respiratory issues are as follows:\par \par 1.   Interstitial lung abnormality (ISAAC)   \par The ground glass opacities have been classified as subpleural, non reticular. There is no increase in the size of opacities. I would label this as interstitial lung abnormality. \par \par -Echocardiogram done 3/10/21 showed normal pulmonary pressures, RVSP 27 mmHg\par -CT chest 10/10/22 findings are consistent with interstitial lung abnormality, subpleural, non reticular\par -Her HECTOR was elevated to 1:320 so we referred her to rheumatology. She saw Dr. Jimenez on 11/18 and CTD panel was negative.\par -She had repeat PFT today showing mildly decreased forced vital capacity (76%). The FEV1 is normal (85%). Diffusion capacity has shown a mild decline, 11.35 (51%) -> 9.56 (43%). Her 6MWT has decreased from 458 meters to 385 meters without significant desaturation. We will continue to keep an eye on this. Today the patient felt tired while walking.\par \par The differential diagnosis is broad:\par a. Autoimmune disease. She has several features that indicate this may be autoimmune related disease (elevated HECTOR in the past). She does not have any clinical evidence of autoimmune related interstitial lung disease.\par b. Hypersensitivity pneumonitis from down feathers, chemical sprays\par c. Smoking related interstitial lung disease. She has almost 40 PY smoking history. She quit smoking 4 months ago\par d. idiopathic pulmonary fibrosis would be lower down on the list as the CT findings are consistent with an alternative diagnosis to IPF. \par \par She had bronchoscopy on 1/7/21, which showed 1% lymphocytes in BAL fluid making the diagnosis of hypersensitivity pneumonitis much less likely. Of note, Envisia came back negative for honeycombing. This is consistent with interstitial lung abnormality most likely related to a smoking related lung disease. The possibilities include:\par - Respiratory bronchiolitis\par - Respiratory bronchiolitis-ILD\par - NSIP\par - Air space enlargement with fibrosis (AEF): Air space with enlargement with fibrosis is reported as a smoking related disease in individuals. Such individuals generally present with emphysema and minimal fibrosis. There may be reticulation that is seen along with bronchiolocentric emphysematous changes. Usually when patients stop smoking, the AEF does not progress. \par - Combined pulmonary fibrosis and emphysema. \par - Follicular bronchiolitis is unlikely although reported in smokers. People who are smokers and follicular bronchiolitis generally have thickened and dilated airways.\par \par Plan:\par - We will continue to follow CT scans, PFT and 6MWT every 6 months, next due May 2023. We will look to see if there is a drop in FVC and TLC drop > than 10% or a drop in diffusion capacity > than 15%. If we find a significant reduction, we will consider starting her on anti fibrotics (Ofev or Esbriet). We will request histogram and texture analysis on the CT to be able to more precisely quantify the extent of ISAAC.\par -Based on the above testing, we will discuss if there is a need for treatment with anti fibrotics (pirfenidone, nintedanib)\par -She was counseled against using chemical sprays, which she sprays on a daily basis.\par -She got rid of feather comforter and mattress cover\par -She has gotten rid of rugs in her home\par \par 2. Hemoptysis\par Rashmi described small amounts of hemoptysis (<1 tsp) on Tuesday morning. After the initial episode, there were small amounts of blood. Bleeding has not recurred except for a drop or two noted when the patient spits out. We have evaluated her CT of the chest. There are no lesions noted on the CT that may explain the hemoptysis. I suspect that Rashmi had acute bronchitis and coughed out some blood. Clinically she is not in respiratory distress, not febrile and does not appear to be toxic. She is a reliable patient. At this stage, I would continue to observe her. It has already been 2 days since the time she had the hemoptysis. She has been advised to come to ER should she have a recurrence. At that time, we would consider doing a bronchoscopy and endobronchial evaluation. We will recheck RVP and COVID PCR. \par \par We have discussed the option of doing a bronchoscopy at this stage, which we would recommend only if the patient was very anxious or nervous. \par \par 3. Former smoker\par We had a detailed discussion about the effects of smoking on ISAAC, including its development and progression.  We discussed a study in CHEST that the incidence of lung cancer in the setting of ISAAC may be higher. She restarted smoking during the pandemic, 1/2 pack per day, but stopped in September 2021. On CT, there is dilatation of the airways, thickening of the walls along with mosaic attenuation. This is separate from the ground glass opacities. In the absence of a reduced FEV1/FVC, it is difficult to make a diagnosis of emphysema. However it must be pointed out that with an increase in elastins of the lung (scarring and fibrosis), the FEV1/FVC ratio may be deceptively normal.\par \par 4. Pulmonary nodule\par In a person who has interstitial lung disease, it is important to monitor for changes on CT. Right upper lobe nodule is stable, 5 mm on chest CT done 8/12/21. We will hold off continuing her enrollment in the lung cancer screening program as we work up her interstitial lung disease. Results of CT scan done 10/10/22 are pending.\par \par 5.  ARNOL\par Risk factors included Mallampati score of IV and possible snoring.  Diagnosed with ARNOL but had difficulty tolerating CPAP.  She is followed by Dr. Streeter. We recommended she make a follow up appointment with Dr. Streeter so she can restart using her CPAP.\par \par 6. Cystic lesions\par There is a cyst seen in the lung. looking for nay increase in size or number of cystic lesions. These cysts can be seen with Sjogren's syndrome. She denies eye dryness. Generally she does not have dry mouth. CT chest 8/12/21 reviewed and was stable. Her HECTOR was elevated to 1:320 so we referred her to rheumatology. She saw Dr. Jimenez on 11/18 and CTD panel was negative. \par \par Return to clinic: 6 months

## 2022-10-13 NOTE — PROCEDURE
[FreeTextEntry1] : PFT 10/13/22\par FVC: 2.43 L (76%)\par FEV1: 2.03 L (85%)\par FEV1/FVC: 84%\par FEF 25-75%: 2.31 L/s (120%)\par TLC: 3.83 L (73%)\par RV/T%\par DLCO: 9.56 (43%)\par \par 6MWT 10/13/22: 385 meters, SpO2 96% -> 94% \par \par PFT 22\par FVC: 2.80 L (88%)\par FEV1: 2.17 L (91%)\par FEV1/FVC: 78%\par FEF 25-75%: 1.91 L/s (99%)\par TLC: 3.96 L (75%)\par RV/T%\par DLCO: 11.35 (51%)\par \par 6MWT 22\par Patient walked 458 meters during a 6 minute walk test.\par Resting O2 saturation was 97% on RA.  Heart rate 61 bpm.\par End test O2 saturation was 94% on RA.  Heart rate 96 bpm. \par \par PFT 21\par FVC: 2.70 L (85%)\par FEV1: 2.07 L (87%)\par FEV1/FVC: 77%\par FEF 25-75%: 1.73 L/s (90%)\par TLC: 3.53 L (67%)\par RV/T%\par DLCO: 11.19 (50%)\par \par 6MWT 21\par Patient walked 450 meters during a 6 minute walk test.\par Resting O2 saturation was 96% on RA.  Heart rate 60 bpm.\par End test O2 saturation was 94% on RA.  Heart rate 79 bpm.  Ulises scale end of test: 0.5\par \par PFT 10/13/21\par FVC: 2.71 L (84%)--> 2.76 L (86%) \par FEV1: 2.18 L (90%)--> 2.20 L (90%) \par FEV1/FVC: 81%--> 80%\par FOW84-20%: 2.29 L/s (116%)--> 2.24 L/s (114%)\par TLC: 3.76 L (71%)\par RV/T.85%\par DLCO: 10.68 (48%)\par \par DLCO: 15.0 (69%) -> DLCO: 17.7 (82%) -> 11.0 (52%) -> 12.55 (61%) - 10.68 (48%)\par \par 6MWT 10/13/21\par Patient walked 544 meters during a 6 minute walk test.\par Resting O2 saturation was 97% on RA. Heart rate 67 bpm.\par End test O2 saturation was 93% on RA. Heart rate 99 bpm. \par \par CT chest 21\par IMPRESSION:\par Stable extent and distribution of interstitial lung changes of peripheral reticular and groundglass opacities, which can be seen in NSIP. \par \par PFT 21\par FVC: 2.61 L (87%)\par FEV1: 2.11 L (91%)\par FEV1/FVC: 81%\par FEF 25-75%: 2.46 L/s (129%)\par TLC: 3.81 L (72%)\par RV/T%\par DLCO: 12.55 (61%)\par Normal spirometry.\par mildly decreased lung volumes\par mildly decreased diffusion capacity\par \par PFT 3/4/21\par FVC: 2.64 L (82%)\par FEV1: 2.12 L (87%)\par FEV1/FVC: 80%\par FEF 25-75%: 2.22 L/s (112%)\par TLC:3.86 L (74%)\par RV/T%\par DLCO: 11.0 (52%)\par Normal spirometry\par mildly decreased lung volume\par moderate diffusion capacity\par \par 6MWT 3/4/21\par Patient walked 487 meters during a 6 minute walk test.\par Resting O2 saturation was 96% on RA. Heart rate 64 bpm.\par End test O2 saturation was 96% on RA. Heart rate 81 bpm. Ulises scale end of test - 1 "very slight"\par This signifies normal walk distance and no desaturation\par \par CT chest 2021:\par Impression:\par 1. 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 5 mm solid nodule right upper lobe-stable.\par \par EXAM: CT LDCT LUNG CA SCRN ANNUAL PROCEDURE DATE: 2020 \par Additional imaging was performed in the prone position complete evaluation of the dependent regions of the lungs. \par Lungs and airways: Image numbers for description of nodule location refer to thin section axial series number 4. \par 1.1 cm air cyst within the anterior segment of the right upper lobe (image 155). There are scattered solid subcentimeter and micronodules as well as benign perifissural lymph nodes. The largest solid nodule measures 5 mm, is noted within the right upper lobe laterally (image 165). There is a peripheral predominantly lower lung zone reticular and groundglass pattern which persists on prone imaging. There may be some subpleural sparing. There is temporal homogeneity. There is no evidence of pulmonary fibrosis. These constellations of findings are suggestive of NSIP. \par Trachea and central bronchi patent. \par Pleura: The pleural spaces are clear. \par Base of neck, mediastinum and heart: The thyroid gland is normal. No mediastinal, hilar or axillary lymphadenopathy is seen. The heart and pericardium are within normal limits. Mild calcification of the aortic valve. Small calcified atheromatous plaque formation is noted throughout the thoracic aorta and proximal abdominal aorta and side branches. \par Coronary artery calcification: None \par Soft tissues: Normal. \par Abdomen: Small type I hiatal hernia. \par Bones: Mild to moderate multilevel degenerative disc disease. Small benign-appearing bone island involving T5 \par Impression: \par 1. Peripheral reticular and groundglass lower lung zone pattern with temporal homogeneity which persists on prone imaging. There may be some mild subpleural sparing. There is no pulmonary fibrosis. 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 Abbreviated differential includes LIP and DIP. \par 2. Scattered solid subcentimeter and micronodules measuring up to 5 mm within the right upper lobe. \par Lung-RADS category: 2S - Benign appearance or behavior. Nodules with very low likelihood of becoming a clinically active cancer due to size or lack of growth. Probability of malignancy < 1%. \par \par Spirometry, DLCO 10/3/19:\par FVC: 2.84 L (96%)\par FEV1: 2.23 L (95%)\par FEV1/FVC: 79%\par CDH59-22%: 2.12 L/s (88%)\par DLCO: 17.7 (82%)\par Normal spirometry. Normal diffusion capacity.\par \par CPAP titration 19: recommended 10 cm H2O, autoPAP was ordered\par PSG 3/13/19: AHI CMS 32, min SpO2 79%, time <= 88% 1.2 min\par \par PFT, 6MWT 19:\par FVC: 2.72 L (92%)\par FEV1: 2.38 L (101%)\par FEV1/FVC: 87%\par RUE59-95%: 3.20 L/s (133%)\par T.23 L (84%)\par RV/T%\par DLCO: 15.0 (69%)\par 6MWT: 439 meters, SpO2 start: 97% --> SpO2 end: 95%\par Normal spirometry. Lung volumes normal. Mildly reduced diffusion capacity. Normal walk distance with no desaturation. \par \par EXAM: CT LDCT LUNG CA SCRN BASELINE \par *** ADDENDUM 2019 *** \par Addendum: \par Abbreviated differential for these peripheral groundglass opacities in the setting of smoking must extend to include DIP. Bronchoscopic correlation and additional HRCT imaging to include end inspiration/end expiration is suggested. \par *** END OF ADDENDUM 2019 *** \par \par PROCEDURE DATE: 2019 \par INTERPRETATION: CT scan of the chest without intravenous contrast, using low-dose lung cancer screening protocol \par History: 65-year-old female nonsmoker with a 37.5 pack year history of smoking \par Comparison: None. \par Findings: \par Lungs and airways: Image numbers for description of nodule location refer to thin section axial series number 4. Scattered calcified granulomata. 4 mm nodule within the right upper lobe (image 166). Is a mild peripheral reticular and groundglass opacities which demonstrates temporal homogeneity with a suggestion of some subpleural sparing involving the upper and lower lung zones slightly more pronounced inferiorly. There are some scattered tree-in-bud centrilobular micronodules. No significant air-trapping is noted. 9 mm air cyst is noted within the lingula (image 167) \par Trachea and central bronchi patent. \par Pleura: The pleural spaces are clear. \par Base of neck, mediastinum and heart: The thyroid gland demonstrates coarse parenchymal calcifications. No mediastinal, hilar or axillary lymphadenopathy is seen. The heart and pericardium are within normal limits. \par Mild calcified atheromatous plaque formation involving the thoracic aorta most pronounced in the arch and proximal abdominal regions \par Coronary artery calcification: None \par Soft tissues: Normal. \par Abdomen: This study was performed without contrast and with lower than standard dose. These factors reduce sensitivity for detection of small lesions in the upper abdomen. Given these technical limitations, no focal lesion is seen within the visualized organs. \par Bones: Mild age-appropriate degenerative change. Small sclerotic focus involving the T5 vertebral body compatible with a probable bone island. \par Impression: \par 1. Scattered tree-in-bud centrilobular micronodules with mild small airway inflammation. No significant air-trapping is identified. \par 2. Mild peripheral reticular and groundglass opacities involving all 5 pulmonary lobes with a slightly lower lung zone predilection. There is relative temporal homogeneity with possibly some subpleural sparing. No \par pulmonary fibrosis. Abbreviated differential includes an NSIP pattern. \par Respiratory bronchiolitis although centrilobular groundglass nodules are not well-visualized, and LIP cannot be excluded. Further imaging to include HRCT to be performed at end inspiration/end expiration is suggested for more complete evaluation \par 3. 4 mm nodule within the right upper lobe. Scattered calcified granulomata. \par Lung-RADS category: 2S - Benign appearance or behavior. Nodules with very low likelihood of becoming a clinically active cancer due to size or lack of growth. Probability of malignancy < 1%. \par Recommendation: \par 2S - Continue annual screening with LDCT in 12 months.

## 2022-10-13 NOTE — PHYSICAL EXAM
[No Acute Distress] : no acute distress [Normal Oropharynx] : normal oropharynx [Normal Appearance] : normal appearance [No Neck Mass] : no neck mass [No Resp Distress] : no resp distress [Clear to Auscultation Bilaterally] : clear to auscultation bilaterally [No Abnormalities] : no abnormalities [Benign] : benign [Normal Gait] : normal gait [No Clubbing] : no clubbing [No Cyanosis] : no cyanosis [No Edema] : no edema [FROM] : FROM [Normal Color/ Pigmentation] : normal color/ pigmentation [No Focal Deficits] : no focal deficits [Oriented x3] : oriented x3 [Normal Affect] : normal affect [TextBox_54] : 2/6 systolic murmur radiating to the left sternal border, no murmurs, rubs or gallops

## 2022-10-13 NOTE — DISCUSSION/SUMMARY
[FreeTextEntry1] : ATTENDING'S SUMMARY:\par 10-13-22:\par no pallor, no icterus\par no oral lesions noted, no evidence of nasal lesions or areas of bleeding in the nose \par no JVD, no hepatojugular reflux, no HSM\par no cervical adenopathy, no supraclavicular adenopathy\par 2/6 systolic murmur radiating to the left sternal border, no murmurs, rubs or gallops\par good air entry bilaterally, no wheezing, rhonchi or crackles\par no cyanosis, no clubbing, no articular manifestations, no thickening of the skin, radial pulses equal bilaterally\par no pedal edema

## 2022-10-14 ENCOUNTER — NON-APPOINTMENT (OUTPATIENT)
Age: 72
End: 2022-10-14

## 2022-10-14 LAB
BASOPHILS # BLD AUTO: 0.04 K/UL
BASOPHILS NFR BLD AUTO: 0.5 %
C4 SERPL-MCNC: 41 MG/DL
CCP AB SER IA-ACNC: <8 UNITS
CENTROMERE IGG SER-ACNC: <0.2 CD:130001892
CH50 SERPL-MCNC: 74 U/ML
CK SERPL-CCNC: 93 U/L
CRP SERPL-MCNC: 6 MG/L
DSDNA AB SER-ACNC: <12 IU/ML
ENA RNP AB SER IA-ACNC: <0.2 AL
ENA SCL70 IGG SER IA-ACNC: <0.2 AL
ENA SM AB SER IA-ACNC: <0.2 AL
ENA SS-A AB SER IA-ACNC: <0.2 AL
ENA SS-B AB SER IA-ACNC: <0.2 AL
EOSINOPHIL # BLD AUTO: 0.16 K/UL
EOSINOPHIL NFR BLD AUTO: 2.1 %
ERYTHROCYTE [SEDIMENTATION RATE] IN BLOOD BY WESTERGREN METHOD: 14 MM/HR
HCT VFR BLD CALC: 42.2 %
HGB BLD-MCNC: 13.5 G/DL
IMM GRANULOCYTES NFR BLD AUTO: 0.3 %
LYMPHOCYTES # BLD AUTO: 1.96 K/UL
LYMPHOCYTES NFR BLD AUTO: 26 %
MAN DIFF?: NORMAL
MCHC RBC-ENTMCNC: 29.5 PG
MCHC RBC-ENTMCNC: 32 GM/DL
MCV RBC AUTO: 92.3 FL
MONOCYTES # BLD AUTO: 0.6 K/UL
MONOCYTES NFR BLD AUTO: 7.9 %
NEUTROPHILS # BLD AUTO: 4.77 K/UL
NEUTROPHILS NFR BLD AUTO: 63.2 %
PLATELET # BLD AUTO: 251 K/UL
RAPID RVP RESULT: NOT DETECTED
RBC # BLD: 4.57 M/UL
RBC # FLD: 14.7 %
RF+CCP IGG SER-IMP: NEGATIVE
RHEUMATOID FACT SER QL: <10 IU/ML
SARS-COV-2 RNA PNL RESP NAA+PROBE: NOT DETECTED
WBC # FLD AUTO: 7.55 K/UL

## 2022-10-17 ENCOUNTER — APPOINTMENT (OUTPATIENT)
Dept: OPHTHALMOLOGY | Facility: CLINIC | Age: 72
End: 2022-10-17

## 2022-10-17 ENCOUNTER — NON-APPOINTMENT (OUTPATIENT)
Age: 72
End: 2022-10-17

## 2022-10-17 LAB
ALDOLASE SERPL-CCNC: 4.7 U/L
ANA PAT FLD IF-IMP: ABNORMAL
ANA SER IF-ACNC: ABNORMAL
RNA POLYMERASE III IGG: 14 UNITS

## 2022-10-17 PROCEDURE — 92134 CPTRZ OPH DX IMG PST SGM RTA: CPT

## 2022-10-17 PROCEDURE — 92014 COMPRE OPH EXAM EST PT 1/>: CPT

## 2022-10-18 LAB — HISTONE AB SER QL: 0.8 UNITS

## 2022-10-18 NOTE — PATIENT PROFILE ADULT - PACKS PER DAY
wrist  brace, pocketbook/Other belongings/Clothing 0.5 wrist  brace, pocketbook; 2 rings; 2 bracelets/Cell Phone/PDA (specify)/Other belongings/Clothing

## 2022-10-27 ENCOUNTER — NON-APPOINTMENT (OUTPATIENT)
Age: 72
End: 2022-10-27

## 2022-10-27 LAB
COMPLEMENT, ALTERNATE PATHWAY (AH50): 110
EJ AB SER QL: NEGATIVE
ENA JO1 AB SER IA-ACNC: <20 UNITS
ENA PM/SCL AB SER-ACNC: <20 UNITS
ENA SM+RNP AB SER IA-ACNC: <20 UNITS
ENA SS-A IGG SER QL: <20 UNITS
FIBRILLARIN AB SER QL: NEGATIVE
KU AB SER QL: NEGATIVE
MDA-5 (P140)(CADM-140): <20 UNITS
MI2 AB SER QL: NEGATIVE
NXP-2 (P140): <20 UNITS
OJ AB SER QL: NEGATIVE
PL12 AB SER QL: NEGATIVE
PL7 AB SER QL: NEGATIVE
SRP AB SERPL QL: NEGATIVE
TIF GAMMA (P155/140): <20 UNITS
U2 SNRNP AB SER QL: NEGATIVE

## 2022-11-01 ENCOUNTER — TRANSCRIPTION ENCOUNTER (OUTPATIENT)
Age: 72
End: 2022-11-01

## 2022-11-17 ENCOUNTER — APPOINTMENT (OUTPATIENT)
Dept: INTERNAL MEDICINE | Facility: CLINIC | Age: 72
End: 2022-11-17

## 2022-11-17 ENCOUNTER — OUTPATIENT (OUTPATIENT)
Dept: OUTPATIENT SERVICES | Facility: HOSPITAL | Age: 72
LOS: 1 days | End: 2022-11-17
Payer: MEDICARE

## 2022-11-17 ENCOUNTER — NON-APPOINTMENT (OUTPATIENT)
Age: 72
End: 2022-11-17

## 2022-11-17 ENCOUNTER — APPOINTMENT (OUTPATIENT)
Dept: ULTRASOUND IMAGING | Facility: HOSPITAL | Age: 72
End: 2022-11-17

## 2022-11-17 ENCOUNTER — APPOINTMENT (OUTPATIENT)
Dept: HEART AND VASCULAR | Facility: CLINIC | Age: 72
End: 2022-11-17
Payer: MEDICARE

## 2022-11-17 VITALS
DIASTOLIC BLOOD PRESSURE: 72 MMHG | WEIGHT: 188 LBS | SYSTOLIC BLOOD PRESSURE: 128 MMHG | HEART RATE: 71 BPM | BODY MASS INDEX: 30.22 KG/M2 | TEMPERATURE: 97.2 F | OXYGEN SATURATION: 97 % | HEIGHT: 66 IN

## 2022-11-17 VITALS
SYSTOLIC BLOOD PRESSURE: 120 MMHG | HEART RATE: 72 BPM | WEIGHT: 189 LBS | HEIGHT: 66 IN | DIASTOLIC BLOOD PRESSURE: 68 MMHG | BODY MASS INDEX: 30.37 KG/M2 | TEMPERATURE: 97.2 F | OXYGEN SATURATION: 97 %

## 2022-11-17 PROCEDURE — 76536 US EXAM OF HEAD AND NECK: CPT

## 2022-11-17 PROCEDURE — 36415 COLL VENOUS BLD VENIPUNCTURE: CPT

## 2022-11-17 PROCEDURE — 93000 ELECTROCARDIOGRAM COMPLETE: CPT

## 2022-11-17 PROCEDURE — 99214 OFFICE O/P EST MOD 30 MIN: CPT | Mod: 25

## 2022-11-17 PROCEDURE — 76536 US EXAM OF HEAD AND NECK: CPT | Mod: 26

## 2022-11-17 PROCEDURE — 99215 OFFICE O/P EST HI 40 MIN: CPT | Mod: 25

## 2022-11-17 RX ORDER — FAMOTIDINE 40 MG/1
40 TABLET, FILM COATED ORAL
Qty: 90 | Refills: 2 | Status: DISCONTINUED | COMMUNITY
Start: 2021-12-29 | End: 2022-11-17

## 2022-11-18 ENCOUNTER — TRANSCRIPTION ENCOUNTER (OUTPATIENT)
Age: 72
End: 2022-11-18

## 2022-11-18 LAB
ALBUMIN SERPL ELPH-MCNC: 4.4 G/DL
ALP BLD-CCNC: 87 U/L
ALT SERPL-CCNC: 22 U/L
ANION GAP SERPL CALC-SCNC: 16 MMOL/L
AST SERPL-CCNC: 19 U/L
BASOPHILS # BLD AUTO: 0.07 K/UL
BASOPHILS NFR BLD AUTO: 0.8 %
BILIRUB SERPL-MCNC: 0.9 MG/DL
BUN SERPL-MCNC: 20 MG/DL
CALCIUM SERPL-MCNC: 9.9 MG/DL
CHLORIDE SERPL-SCNC: 99 MMOL/L
CHOLEST SERPL-MCNC: 144 MG/DL
CO2 SERPL-SCNC: 24 MMOL/L
CREAT SERPL-MCNC: 0.7 MG/DL
EGFR: 92 ML/MIN/1.73M2
EOSINOPHIL # BLD AUTO: 0.2 K/UL
EOSINOPHIL NFR BLD AUTO: 2.4 %
GLUCOSE SERPL-MCNC: 123 MG/DL
HCT VFR BLD CALC: 44.2 %
HDLC SERPL-MCNC: 46 MG/DL
HGB BLD-MCNC: 14.1 G/DL
IMM GRANULOCYTES NFR BLD AUTO: 0.2 %
LDLC SERPL CALC-MCNC: 71 MG/DL
LYMPHOCYTES # BLD AUTO: 2.13 K/UL
LYMPHOCYTES NFR BLD AUTO: 25.8 %
MAN DIFF?: NORMAL
MCHC RBC-ENTMCNC: 28.8 PG
MCHC RBC-ENTMCNC: 31.9 GM/DL
MCV RBC AUTO: 90.4 FL
MONOCYTES # BLD AUTO: 0.53 K/UL
MONOCYTES NFR BLD AUTO: 6.4 %
NEUTROPHILS # BLD AUTO: 5.31 K/UL
NEUTROPHILS NFR BLD AUTO: 64.4 %
NONHDLC SERPL-MCNC: 98 MG/DL
PLATELET # BLD AUTO: 263 K/UL
POTASSIUM SERPL-SCNC: 4.3 MMOL/L
PROT SERPL-MCNC: 6.6 G/DL
RBC # BLD: 4.89 M/UL
RBC # FLD: 14.6 %
SODIUM SERPL-SCNC: 139 MMOL/L
TRIGL SERPL-MCNC: 135 MG/DL
WBC # FLD AUTO: 8.26 K/UL

## 2022-11-20 NOTE — PHYSICAL EXAM
[Well Developed] : well developed [Well Nourished] : well nourished [No Acute Distress] : no acute distress [Normal Venous Pressure] : normal venous pressure [No Carotid Bruit] : no carotid bruit [Normal S1, S2] : normal S1, S2 [No Murmur] : no murmur [No Rub] : no rub [No Gallop] : no gallop [Clear Lung Fields] : clear lung fields [Good Air Entry] : good air entry [No Respiratory Distress] : no respiratory distress  [Soft] : abdomen soft [Non Tender] : non-tender [No Masses/organomegaly] : no masses/organomegaly [Normal Bowel Sounds] : normal bowel sounds [Normal Gait] : normal gait [No Edema] : no edema [No Cyanosis] : no cyanosis [No Clubbing] : no clubbing [No Varicosities] : no varicosities [No Rash] : no rash [No Skin Lesions] : no skin lesions [Moves all extremities] : moves all extremities [No Focal Deficits] : no focal deficits [Normal Speech] : normal speech [Alert and Oriented] : alert and oriented [Normal memory] : normal memory [General Appearance - Well Developed] : well developed [Normal Appearance] : normal appearance [Well Groomed] : well groomed [General Appearance - Well Nourished] : well nourished [No Deformities] : no deformities [General Appearance - In No Acute Distress] : no acute distress [Normal Conjunctiva] : the conjunctiva exhibited no abnormalities [Eyelids - No Xanthelasma] : the eyelids demonstrated no xanthelasmas [Normal Oral Mucosa] : normal oral mucosa [No Oral Pallor] : no oral pallor [No Oral Cyanosis] : no oral cyanosis [Normal Jugular Venous A Waves Present] : normal jugular venous A waves present [Normal Jugular Venous V Waves Present] : normal jugular venous V waves present [No Jugular Venous Burks A Waves] : no jugular venous burks A waves [Respiration, Rhythm And Depth] : normal respiratory rhythm and effort [Exaggerated Use Of Accessory Muscles For Inspiration] : no accessory muscle use [Auscultation Breath Sounds / Voice Sounds] : lungs were clear to auscultation bilaterally [Heart Rate And Rhythm] : heart rate and rhythm were normal [Murmurs] : no murmurs present [Heart Sounds] : normal S1 and S2 [FreeTextEntry1] : systolic murmur [Abdomen Soft] : soft [Abdomen Tenderness] : non-tender [Abdomen Mass (___ Cm)] : no abdominal mass palpated [Abnormal Walk] : normal gait [Gait - Sufficient For Exercise Testing] : the gait was sufficient for exercise testing [Nail Clubbing] : no clubbing of the fingernails [Cyanosis, Localized] : no localized cyanosis [Petechial Hemorrhages (___cm)] : no petechial hemorrhages [Skin Color & Pigmentation] : normal skin color and pigmentation [] : no rash [No Venous Stasis] : no venous stasis [Skin Lesions] : no skin lesions [No Skin Ulcers] : no skin ulcer [No Xanthoma] : no  xanthoma was observed [Oriented To Time, Place, And Person] : oriented to person, place, and time [Affect] : the affect was normal [Mood] : the mood was normal [No Anxiety] : not feeling anxious

## 2022-11-20 NOTE — HISTORY OF PRESENT ILLNESS
[FreeTextEntry1] : 71 F with TIA HTN carotid stenosis ccta 2016 normal coronaries echo asymmetric septal hypertrophy with dynamic outflow tract obstruction peak gradient 47 resting 14 mmHg. negative genetic testing negative CMRI ILD ARNOL Interstitial lung abnormality former smoker \par \par here for fu she feels great has no complaints forgets to take her coreg at night \par \par \par \par \par ecg nsr lvh nsst changes 11/17/2022

## 2022-11-20 NOTE — ASSESSMENT
[FreeTextEntry1] : change coreg to metoprolol as it once a day \par htn no diuretics may have created LVOT obstruction \par carotid stenosis on atorvastatin per neuro now on aspirin no plavix \par smoking cessation she has stopped smoking she is vaping\par asymetric septal hypertrophy no outflow gradient continue current meds hydration no further work up\par fu in 6 months

## 2022-11-21 ENCOUNTER — TRANSCRIPTION ENCOUNTER (OUTPATIENT)
Age: 72
End: 2022-11-21

## 2022-11-23 ENCOUNTER — TRANSCRIPTION ENCOUNTER (OUTPATIENT)
Age: 72
End: 2022-11-23

## 2022-12-05 ENCOUNTER — APPOINTMENT (OUTPATIENT)
Dept: NEUROLOGY | Facility: CLINIC | Age: 72
End: 2022-12-05
Payer: MEDICARE

## 2022-12-05 VITALS
HEART RATE: 60 BPM | HEIGHT: 66 IN | DIASTOLIC BLOOD PRESSURE: 70 MMHG | TEMPERATURE: 95.2 F | BODY MASS INDEX: 30.37 KG/M2 | OXYGEN SATURATION: 95 % | WEIGHT: 189 LBS | SYSTOLIC BLOOD PRESSURE: 121 MMHG

## 2022-12-05 DIAGNOSIS — R41.3 OTHER AMNESIA: ICD-10-CM

## 2022-12-05 DIAGNOSIS — I65.29 OCCLUSION AND STENOSIS OF UNSPECIFIED CAROTID ARTERY: ICD-10-CM

## 2022-12-05 DIAGNOSIS — R51.9 HEADACHE, UNSPECIFIED: ICD-10-CM

## 2022-12-05 DIAGNOSIS — R41.89 OTHER SYMPTOMS AND SIGNS INVOLVING COGNITIVE FUNCTIONS AND AWARENESS: ICD-10-CM

## 2022-12-05 PROCEDURE — 99213 OFFICE O/P EST LOW 20 MIN: CPT

## 2022-12-05 NOTE — HISTORY OF PRESENT ILLNESS
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 She is here for follow-up.\par \par Since her last visit, the patient denies any new stroke-like symptoms. She continues aspirin 81 mg and atorvastatin 80 w/o side effects. She follows closely with her PCP for vascular risk factor modification.  Most recent LDL in 11/2022 was 71. She has not had a recent A1c. She is proud to say she has stopped smoking. She is not using CPAP for her ARNOL.\par \par The patient's biggest concern is her short-term memory. Her family/friends have never said anything until recently her son agreed she is becoming more forgetful. She is fully independent in ADLs, IADLs.

## 2022-12-05 NOTE — ASSESSMENT
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 For now, she will continue aspirin, statin therapy. I have again applauded her for stopping smoking and encouraged her to use CPAP to further lower her vascular risk.\par \par with regard to her memory- the patient is apprehensive to undergo MRI due to her claustrophobia. Plan to check labs for treatable causes and schedule neuropsych testing. We will re-evaluate in 6 months at which time we can again discuss the potential need for MRI.

## 2022-12-05 NOTE — PHYSICAL EXAM
[FreeTextEntry1] : MMSE 30/30. Alert. Fully oriented. Speech/language intact. CN grossly intact. Moving all limbs symmetrically. Gait is normal.\par

## 2022-12-07 ENCOUNTER — TRANSCRIPTION ENCOUNTER (OUTPATIENT)
Age: 72
End: 2022-12-07

## 2022-12-08 ENCOUNTER — APPOINTMENT (OUTPATIENT)
Dept: INTERNAL MEDICINE | Facility: CLINIC | Age: 72
End: 2022-12-08

## 2022-12-08 ENCOUNTER — NON-APPOINTMENT (OUTPATIENT)
Age: 72
End: 2022-12-08

## 2022-12-08 VITALS
WEIGHT: 189 LBS | DIASTOLIC BLOOD PRESSURE: 55 MMHG | BODY MASS INDEX: 30.37 KG/M2 | OXYGEN SATURATION: 97 % | TEMPERATURE: 96.4 F | SYSTOLIC BLOOD PRESSURE: 96 MMHG | HEIGHT: 66 IN | HEART RATE: 77 BPM

## 2022-12-08 VITALS — DIASTOLIC BLOOD PRESSURE: 61 MMHG | SYSTOLIC BLOOD PRESSURE: 98 MMHG

## 2022-12-08 DIAGNOSIS — H66.90 OTITIS MEDIA, UNSPECIFIED, UNSPECIFIED EAR: ICD-10-CM

## 2022-12-08 LAB
FOLATE SERPL-MCNC: 7.3 NG/ML
PLATELET RESPONSE ASPIRIN: 391 ARU
TSH SERPL-ACNC: 3.49 UIU/ML
VIT B12 SERPL-MCNC: 495 PG/ML

## 2022-12-08 PROCEDURE — 99214 OFFICE O/P EST MOD 30 MIN: CPT

## 2022-12-09 ENCOUNTER — TRANSCRIPTION ENCOUNTER (OUTPATIENT)
Age: 72
End: 2022-12-09

## 2022-12-09 LAB
ESTIMATED AVERAGE GLUCOSE: 117 MG/DL
HBA1C MFR BLD HPLC: 5.7 %

## 2022-12-10 PROBLEM — H66.90 ACUTE OTITIS MEDIA: Status: RESOLVED | Noted: 2022-12-09 | Resolved: 2023-01-08

## 2022-12-11 ENCOUNTER — TRANSCRIPTION ENCOUNTER (OUTPATIENT)
Age: 72
End: 2022-12-11

## 2022-12-12 LAB — METHYLMALONATE SERPL-SCNC: 200 NMOL/L

## 2022-12-21 ENCOUNTER — TRANSCRIPTION ENCOUNTER (OUTPATIENT)
Age: 72
End: 2022-12-21

## 2022-12-22 ENCOUNTER — TRANSCRIPTION ENCOUNTER (OUTPATIENT)
Age: 72
End: 2022-12-22

## 2023-01-08 ENCOUNTER — TRANSCRIPTION ENCOUNTER (OUTPATIENT)
Age: 73
End: 2023-01-08

## 2023-01-17 ENCOUNTER — APPOINTMENT (OUTPATIENT)
Dept: OPHTHALMOLOGY | Facility: CLINIC | Age: 73
End: 2023-01-17

## 2023-02-22 NOTE — REVIEW OF SYSTEMS
Chief Complaint   Patient presents with   • Establish Care       HISTORY OF PRESENT ILLNESS:  Yang is a 58 year old male who presents today for establish care.  He is accompanied by his ex-wife, permission was obtained for her to stay in the room..  We do not have any of his medical information, he does not know the names of his medications that he is on.  Previous provider is in [place] or possibly [place]-he did sign information release form for us to be able to get his records    Paxton currently does not work, living in  with his ex wife.   He has been previously  and  twice.  He has 9 adult children..  He smokes marijuana.  Tells me he was using IV methamphetamine and other drugs, has been clean for past 2 weeks, his ex-wife has been helping him with this.  He does plan to get his life together and that is why he is here    He has following chronic issues  Hypertension-unsure what he is taking for this, but his blood pressure is good control.  Denies any nausea vomiting chest pain headaches    Diabetes mellitus-patient's tells me it is uncontrolled associated with numbness and tingling, also reports some erectile dysfunction with it.  Per patient his last hemoglobin A1 c was over 15, he was started on insulin by endocrinologist in [place], he does not know the name of the insulin or the does.  He does not monitor his sugars out has noted  He is also requesting prescription for Viagra    BPH-patient's states he has been struggling with enlarged prostate, his father has history of prostate cancer.  He did see Urology at some point and underwent cystoscopy which was normal.  He was on Flomax, would like to be started back on it.  He does report hesitancy and dribbling.          ALLERGIES:  No Known Allergies    Current Outpatient Medications   Medication Sig Dispense Refill   • Insulin Pen Needle 32G X 6 MM Misc Use to inject insulin 1 time daily. Remove needle cover(s) to expose needle before  Detail Level: Generalized injecting. 100 each 3   • gabapentin (NEURONTIN) 300 MG capsule Take 1 capsule by mouth in the morning and 1 capsule at noon and 1 capsule before bedtime. 270 capsule 0   • insulin glargine (Lantus SoloStar) 100 UNIT/ML pen-injector Inject 25 Units into the skin nightly. Prime 2 units before each dose. 15 mL 0   • Insulin Lispro (HumaLOG) 100 UNIT/ML Solution Use per sliding scale t.i.d., inject 1 unit blood sugar 200-250, 2 units units for blood sugar  251-300, 4 units 351-400, 6 units> 400 and call MD 12 mL 0   • tamsulosin (Flomax) 0.4 MG Cap Take 1 capsule by mouth daily. 30 capsule 3   • sertraline (Zoloft) 50 MG tablet Take 1 tablet p.o. daily for 1 week then 2 tablets p.o. daily and increased to 150 mg daily after that 180 tablet 0   • blood glucose meter Test blood sugar 3  times daily as directed. Diagnosis: Diabetes. Meter: Whichever insurance allows. 1 kit 0   • blood glucose test strip Test blood sugar 3 times daily as directed. Diagnosis: Diabetes. Meter: Whichever insurance allows. 100 each 3   • blood glucose lancets Test blood sugar 3 times daily as directed. Diagnosis: Diabetes. Meter: Whichever insurance allows. 100 each 5   • sildenafil (Viagra) 50 MG tablet Take 1-2 tablets po once as needed 30 minutes prior to intercourse 10 tablet 6   • lidocaine (LIDODERM) 5 % patch Place 1 patch onto the skin daily.       No current facility-administered medications for this visit.       Past Medical History:   Diagnosis Date   • Depressive disorder    • Diabetes mellitus (CMS/[facility])    • Essential (primary) hypertension        Family History   Problem Relation Age of Onset   • Diabetes Mother    • Hypertension Mother    • Cancer, Prostate Father    • Diabetes Father    • Stroke Father        Social History     Tobacco Use   • Smoking status: Current Every Day Smoker     Packs/day: 0.25     Start date: 1/1/1974   • Smokeless tobacco: Never Used   Substance Use Topics   • Alcohol use: Yes     Alcohol/week: 20.0  Detail Level: Zone standard drinks     Types: 20 Standard drinks or equivalent per week   • Drug use: Yes     Types: Marijuana     Comment: IV drug user, quit methamphetamines 2 weeks prior to this visit         REVIEW OF SYSTEMS:  Constitutional:  Fatigue excessive sweating heat and cold intolerance  Eyes:  Denies change in visual acuity   HENT:  Tinnitus nasal congestion change in vision  Respiratory:  Denies cough or shortness of breath   Cardiovascular:  Denies chest pain or edema   Gastrointestinal:  Denies abdominal pain, nausea, vomiting, bloody stools or diarrhea   Genitourinary:  Denies dysuria   Musculoskeletal:  Neck pain back pain  Integument:  Denies rash   Neurologic:  Recurrent numbness and tingling is of hand and feet  Endocrine:  Denies polyuria or polydipsia   Lymphatic:  Denies swollen glands   Psychiatric:   depression or anxiety       PHYSICAL EXAM:  Vitals:   Visit Vitals  /70 (BP Location: LUE - Left upper extremity, Patient Position: Sitting, Cuff Size: Regular)   Pulse 76   Resp 16   Wt 79.5 kg (175 lb 3.2 oz)     Constitutional:  Well developed, well nourished, no acute distress, non-toxic appearance.  Eyes:  Pupils equal, round, reactive to light. Conjunctivae normal.  HENT:  Atraumatic. External ears normal. Nose normal. Oropharynx moist, no pharyngeal exudates. Neck- normal range of motion, no tenderness, supple.  Respiratory:  No respiratory distress, normal breath sounds, no rales, no wheezing.  Cardiovascular:  Normal rate, normal rhythm. No murmurs, no gallops, no rubs.  Gastrointestinal:  Soft, nondistended, normal bowel sounds, nontender. No organomegaly, no mass, no rebound, no guarding.  Genitourinary:  No costovertebral angle tenderness.  Musculoskeletal:  No edema, no tenderness, no deformities. Back- no tenderness.  Integument:  Well hydrated, no rash.  Lymphatic:  No lymphadenopathy noted.  Neurologic:  Alert and oriented x 3. Cranial nerves 2-12 normal. Normal motor function. Normal  Detail Level: Simple sensory function. No focal deficits noted.  Psychiatric:  Speech and behavior appropriate.    No results found for any previous visit.        ASSESSMENT/PLAN:  Encounter to establish care  Patient is welcome to the practice, he has signed release information, he was advised to call us with the name of the medication or at least last pharmacy where he got all his prescriptions, at this time I have no idea what he is taking    HTN (hypertension), benign  Patient's blood pressure is good control at this time, he will continue his current medications    Type 2 diabetes mellitus with diabetic polyneuropathy, with long-term current use of insulin (CMS/[facility])  In seem to know what he is taking he and do not have his medical records, recommend to get fasting blood work done for CMP lipid and hemoglobin A1c urine microalbumin.  He will continue his current medications in the meantime again was advised to call us with the names and doses of the medication that he is taking    We were able to movement obtain his medication list later, and he was started back on Lantus 25 units into skin and lispro per sliding.  He was instructed to get his blood work done as soon as possible  - COMPREHENSIVE METABOLIC PANEL; Future  - LIPID PANEL WITH REFLEX; Future  - GLYCOHEMOGLOBIN; Future  - MICROALBUMIN URINE RANDOM; Future  - SERVICE TO PODIATRY    Diabetic polyneuropathy associated with type 2 diabetes mellitus  Continue gabapentin 300 mg p.o. t.i.d..  Adverse effects reviewed.    Health care maintenance    - THYROID STIMULATING HORMONE REFLEX; Future  - CBC WITH DIFFERENTIAL; Future    Mixed hyperlipidemia  Will obtain lipid panel.  Patient is not aware what he is taking.  Will obtain blood work before starting him on medication at this time.  - LIPID PANEL WITH REFLEX; Future    Benign prostatic hyperplasia with lower urinary tract symptoms, symptom details unspecified  Future blood work order for PSA,  - PSA; Future    Erectile  dysfunction associated with type 2 diabetes mellitus (CMS/HCC)  Patient is given prescription for Viagra 50 mg 1-2 tablets p.o. once as needed 30 minutes prior to intercourse.  Adverse effects of hypertension dizziness headaches, painful erection was reviewed with the patient.    Major depressive disorder, recurrent episode, moderate (CMS/HCC)xiety  Patient is started back on sotalol test medication that he recalls being on, he started 50 mg p.o. daily for 1 week then 2 tablets p.o. daily for next week and followed by for 150 mg p.o. daily, adverse effect medication reviewed with the patient.  We reviewed this medication takes 4-6 weeks to be effective and not to discontinue it without talking with the provider started it 1st  - sertraline (Zoloft) 50 MG tablet; Take 1 tablet p.o. daily for 1 week then 2 tablets p.o. daily and increased to 150 mg daily after that  Dispense: 180 tablet; Refill: 0      IV drug user  Recommend to follow-up with counselor and rehab, ex wife is present and him, plans to get him into private counseling.    Recent PHQ 2/9 Score    PHQ 2:  Date Adult PHQ 2 Score Adult PHQ 2 Interpretation   6/10/2022 6 Further screening needed       PHQ 9:  Date Adult PHQ 9 Score Adult PHQ 9 Interpretation   6/10/2022 27 Severe Depression       DEPRESSION ASSESSMENT/PLAN:  Start and/or continue medication.  See orders for details.       Orders Placed This Encounter   • Comprehensive Metabolic Panel   • Lipid Panel With Reflex   • Glycohemoglobin   • Microalbumin Urine Random   • Thyroid Stimulating Hormone Reflex   • CBC with Automated Differential   • PSA   • SERVICE TO PODIATRY   • lidocaine (LIDODERM) 5 % patch   • tamsulosin (Flomax) 0.4 MG Cap   • DISCONTD: sildenafil (Viagra) 50 MG tablet   • sertraline (Zoloft) 50 MG tablet   • blood glucose meter   • blood glucose test strip   • blood glucose lancets   • DISCONTD: insulin glargine (Lantus SoloStar) 100 UNIT/ML pen-injector   • DISCONTD: gabapentin  (NEURONTIN) 300 MG capsule   • DISCONTD: Insulin Lispro (HumaLOG) 100 UNIT/ML Solution         FOLLOW-UP:  Return for Three months with blood work prior.   Detail Level: Detailed [Nasal Congestion] : nasal congestion [Cough] : cough [Hypertension] : ~T hypertension [Reflux] : reflux [Arthralgias] : arthralgias [As Noted in HPI] : as noted in HPI [Snoring] : snoring [Negative] : Dermatologic [Fever] : no fever [Hemoptysis] : no hemoptysis [Dyspnea] : no dyspnea [Chest Tightness] : no chest tightness [Pleuritic Pain] : no pleuritic pain [Wheezing] : no wheezing [de-identified] : T [de-identified] : TIA

## 2023-03-06 ENCOUNTER — RX RENEWAL (OUTPATIENT)
Age: 73
End: 2023-03-06

## 2023-03-23 NOTE — ED ADULT NURSE NOTE - CAS EDN DISCHARGE ASSESSMENT
Detail Level: Zone Detail Level: Generalized Detail Level: Detailed Prescription Strength Graduated Compression Stockings Recommendations: The patient was counseled that prescription strength graduated compression stockings should be worn for all waking hours. They will follow up with a venous specialist to monitor graduated compression stocking usage and their symptoms. Alert and oriented to person, place and time

## 2023-04-13 NOTE — ED ADULT NURSE NOTE - CAS ELECT INFOMATION PROVIDED
Tazorac Pregnancy And Lactation Text: This medication is not safe during pregnancy. It is unknown if this medication is excreted in breast milk. DC instructions

## 2023-06-06 ENCOUNTER — NON-APPOINTMENT (OUTPATIENT)
Age: 73
End: 2023-06-06

## 2023-06-06 ENCOUNTER — APPOINTMENT (OUTPATIENT)
Dept: OPHTHALMOLOGY | Facility: CLINIC | Age: 73
End: 2023-06-06
Payer: MEDICARE

## 2023-06-06 PROBLEM — J40 BRONCHITIS: Status: ACTIVE | Noted: 2018-10-15

## 2023-06-06 PROBLEM — R91.8 GROUND GLASS OPACITY PRESENT ON IMAGING OF LUNG: Status: ACTIVE | Noted: 2019-02-19

## 2023-06-06 PROBLEM — G47.33 OSA (OBSTRUCTIVE SLEEP APNEA): Status: ACTIVE | Noted: 2019-03-25

## 2023-06-06 PROBLEM — R06.02 SHORTNESS OF BREATH: Status: ACTIVE | Noted: 2021-01-04

## 2023-06-06 PROCEDURE — 92014 COMPRE OPH EXAM EST PT 1/>: CPT

## 2023-06-07 ENCOUNTER — APPOINTMENT (OUTPATIENT)
Dept: HEART AND VASCULAR | Facility: CLINIC | Age: 73
End: 2023-06-07
Payer: MEDICARE

## 2023-06-07 ENCOUNTER — NON-APPOINTMENT (OUTPATIENT)
Age: 73
End: 2023-06-07

## 2023-06-07 VITALS
WEIGHT: 186 LBS | DIASTOLIC BLOOD PRESSURE: 80 MMHG | HEIGHT: 66 IN | BODY MASS INDEX: 29.89 KG/M2 | SYSTOLIC BLOOD PRESSURE: 152 MMHG | OXYGEN SATURATION: 98 % | TEMPERATURE: 98.5 F | HEART RATE: 62 BPM

## 2023-06-07 DIAGNOSIS — R01.1 CARDIAC MURMUR, UNSPECIFIED: ICD-10-CM

## 2023-06-07 PROCEDURE — 99214 OFFICE O/P EST MOD 30 MIN: CPT | Mod: 25

## 2023-06-07 PROCEDURE — 93000 ELECTROCARDIOGRAM COMPLETE: CPT

## 2023-06-07 PROCEDURE — 36415 COLL VENOUS BLD VENIPUNCTURE: CPT

## 2023-06-08 LAB
ALBUMIN SERPL ELPH-MCNC: 4.4 G/DL
ALP BLD-CCNC: 96 U/L
ALT SERPL-CCNC: 17 U/L
ANION GAP SERPL CALC-SCNC: 15 MMOL/L
AST SERPL-CCNC: 19 U/L
BILIRUB SERPL-MCNC: 0.9 MG/DL
BUN SERPL-MCNC: 21 MG/DL
CALCIUM SERPL-MCNC: 9.9 MG/DL
CHLORIDE SERPL-SCNC: 98 MMOL/L
CHOLEST SERPL-MCNC: 132 MG/DL
CO2 SERPL-SCNC: 24 MMOL/L
CREAT SERPL-MCNC: 0.8 MG/DL
CREAT SPEC-SCNC: 113 MG/DL
EGFR: 78 ML/MIN/1.73M2
ESTIMATED AVERAGE GLUCOSE: 105 MG/DL
GLUCOSE SERPL-MCNC: 133 MG/DL
HBA1C MFR BLD HPLC: 5.3 %
HDLC SERPL-MCNC: 60 MG/DL
LDLC SERPL CALC-MCNC: 48 MG/DL
MICROALBUMIN 24H UR DL<=1MG/L-MCNC: 1.3 MG/DL
MICROALBUMIN/CREAT 24H UR-RTO: 11 MG/G
NONHDLC SERPL-MCNC: 72 MG/DL
POTASSIUM SERPL-SCNC: 4.6 MMOL/L
PROT SERPL-MCNC: 6.6 G/DL
SODIUM SERPL-SCNC: 137 MMOL/L
TRIGL SERPL-MCNC: 123 MG/DL

## 2023-06-08 NOTE — HISTORY OF PRESENT ILLNESS
[FreeTextEntry1] : 72 F with TIA HTN carotid stenosis ccta 2016 normal coronaries echo asymmetric septal hypertrophy with dynamic outflow tract obstruction peak gradient 47 resting 14 mmHg. negative genetic testing negative CMRI ILD ARNOL Interstitial lung abnormality former smoker \par \par here for fu she feels great has no complaints forgets to take her coreg at night \par \par \par \par \par ecg nsr lvh nsst changes 6/7/2023\par 3/2021 mild LVH normal EF

## 2023-06-08 NOTE — PHYSICAL EXAM
[Well Developed] : well developed [Well Nourished] : well nourished [No Acute Distress] : no acute distress [Normal Venous Pressure] : normal venous pressure [No Carotid Bruit] : no carotid bruit [Normal S1, S2] : normal S1, S2 [No Murmur] : no murmur [No Rub] : no rub [No Gallop] : no gallop [Clear Lung Fields] : clear lung fields [Good Air Entry] : good air entry [No Respiratory Distress] : no respiratory distress  [Soft] : abdomen soft [Non Tender] : non-tender [No Masses/organomegaly] : no masses/organomegaly [Normal Bowel Sounds] : normal bowel sounds [Normal Gait] : normal gait [No Edema] : no edema [No Cyanosis] : no cyanosis [No Clubbing] : no clubbing [No Varicosities] : no varicosities [No Rash] : no rash [No Skin Lesions] : no skin lesions [Moves all extremities] : moves all extremities [No Focal Deficits] : no focal deficits [Normal Speech] : normal speech [Alert and Oriented] : alert and oriented [Normal memory] : normal memory [General Appearance - Well Developed] : well developed [Normal Appearance] : normal appearance [Well Groomed] : well groomed [General Appearance - Well Nourished] : well nourished [No Deformities] : no deformities [General Appearance - In No Acute Distress] : no acute distress [Normal Conjunctiva] : the conjunctiva exhibited no abnormalities [Eyelids - No Xanthelasma] : the eyelids demonstrated no xanthelasmas [Normal Oral Mucosa] : normal oral mucosa [No Oral Pallor] : no oral pallor [No Oral Cyanosis] : no oral cyanosis [Normal Jugular Venous A Waves Present] : normal jugular venous A waves present [Normal Jugular Venous V Waves Present] : normal jugular venous V waves present [No Jugular Venous Burks A Waves] : no jugular venous burks A waves [Respiration, Rhythm And Depth] : normal respiratory rhythm and effort [Exaggerated Use Of Accessory Muscles For Inspiration] : no accessory muscle use [Auscultation Breath Sounds / Voice Sounds] : lungs were clear to auscultation bilaterally [Heart Rate And Rhythm] : heart rate and rhythm were normal [Heart Sounds] : normal S1 and S2 [Murmurs] : no murmurs present [Abdomen Soft] : soft [Abdomen Tenderness] : non-tender [Abdomen Mass (___ Cm)] : no abdominal mass palpated [Abnormal Walk] : normal gait [Gait - Sufficient For Exercise Testing] : the gait was sufficient for exercise testing [Nail Clubbing] : no clubbing of the fingernails [Cyanosis, Localized] : no localized cyanosis [Petechial Hemorrhages (___cm)] : no petechial hemorrhages [Skin Color & Pigmentation] : normal skin color and pigmentation [] : no rash [No Venous Stasis] : no venous stasis [Skin Lesions] : no skin lesions [No Skin Ulcers] : no skin ulcer [No Xanthoma] : no  xanthoma was observed [Oriented To Time, Place, And Person] : oriented to person, place, and time [Affect] : the affect was normal [Mood] : the mood was normal [No Anxiety] : not feeling anxious [FreeTextEntry1] : systolic murmur

## 2023-06-08 NOTE — ASSESSMENT
[FreeTextEntry1] : change coreg to metoprolol 50 mg daily as it once a day \par htn no diuretics may have created LVOT obstruction \par carotid stenosis on atorvastatin per neuro now on aspirin no plavix \par smoking cessation she has stopped smoking she is vaping\par asymetric septal hypertrophy no outflow gradient continue current meds repeat to assess for gradient\par fu in 6 months

## 2023-06-12 ENCOUNTER — APPOINTMENT (OUTPATIENT)
Dept: CT IMAGING | Facility: HOSPITAL | Age: 73
End: 2023-06-12

## 2023-06-12 ENCOUNTER — OUTPATIENT (OUTPATIENT)
Dept: OUTPATIENT SERVICES | Facility: HOSPITAL | Age: 73
LOS: 1 days | End: 2023-06-12
Payer: MEDICARE

## 2023-06-12 ENCOUNTER — RESULT REVIEW (OUTPATIENT)
Age: 73
End: 2023-06-12

## 2023-06-12 PROCEDURE — 71250 CT THORAX DX C-: CPT

## 2023-06-12 PROCEDURE — 71250 CT THORAX DX C-: CPT | Mod: 26

## 2023-06-13 ENCOUNTER — LABORATORY RESULT (OUTPATIENT)
Age: 73
End: 2023-06-13

## 2023-06-13 ENCOUNTER — TRANSCRIPTION ENCOUNTER (OUTPATIENT)
Age: 73
End: 2023-06-13

## 2023-06-13 ENCOUNTER — NON-APPOINTMENT (OUTPATIENT)
Age: 73
End: 2023-06-13

## 2023-06-13 ENCOUNTER — APPOINTMENT (OUTPATIENT)
Dept: PULMONOLOGY | Facility: CLINIC | Age: 73
End: 2023-06-13
Payer: MEDICARE

## 2023-06-13 VITALS
TEMPERATURE: 97.4 F | WEIGHT: 182 LBS | OXYGEN SATURATION: 95 % | HEIGHT: 66 IN | BODY MASS INDEX: 29.25 KG/M2 | DIASTOLIC BLOOD PRESSURE: 75 MMHG | SYSTOLIC BLOOD PRESSURE: 136 MMHG | HEART RATE: 60 BPM

## 2023-06-13 DIAGNOSIS — R06.02 SHORTNESS OF BREATH: ICD-10-CM

## 2023-06-13 DIAGNOSIS — G47.33 OBSTRUCTIVE SLEEP APNEA (ADULT) (PEDIATRIC): ICD-10-CM

## 2023-06-13 DIAGNOSIS — R91.8 OTHER NONSPECIFIC ABNORMAL FINDING OF LUNG FIELD: ICD-10-CM

## 2023-06-13 DIAGNOSIS — J40 BRONCHITIS, NOT SPECIFIED AS ACUTE OR CHRONIC: ICD-10-CM

## 2023-06-13 PROCEDURE — 94727 GAS DIL/WSHOT DETER LNG VOL: CPT

## 2023-06-13 PROCEDURE — 99215 OFFICE O/P EST HI 40 MIN: CPT

## 2023-06-13 PROCEDURE — 94010 BREATHING CAPACITY TEST: CPT

## 2023-06-13 PROCEDURE — 94618 PULMONARY STRESS TESTING: CPT

## 2023-06-14 ENCOUNTER — NON-APPOINTMENT (OUTPATIENT)
Age: 73
End: 2023-06-14

## 2023-06-14 ENCOUNTER — APPOINTMENT (OUTPATIENT)
Dept: INTERNAL MEDICINE | Facility: CLINIC | Age: 73
End: 2023-06-14
Payer: MEDICARE

## 2023-06-14 LAB
C3 SERPL-MCNC: 144 MG/DL
C4 SERPL-MCNC: 35 MG/DL
CCP AB SER IA-ACNC: <8 UNITS
CH50 SERPL-MCNC: 91 U/ML
CK SERPL-CCNC: 53 U/L
CRP SERPL-MCNC: 11 MG/L
ERYTHROCYTE [SEDIMENTATION RATE] IN BLOOD BY WESTERGREN METHOD: 10 MM/HR
RF+CCP IGG SER-IMP: NEGATIVE
RHEUMATOID FACT SER QL: <10 IU/ML

## 2023-06-14 PROCEDURE — 99442: CPT

## 2023-06-15 ENCOUNTER — NON-APPOINTMENT (OUTPATIENT)
Age: 73
End: 2023-06-15

## 2023-06-15 LAB
ALDOLASE SERPL-CCNC: 4.4 U/L
ANA SER IF-ACNC: NEGATIVE
ENA SCL70 IGG SER IA-ACNC: <0.2 AL

## 2023-06-15 NOTE — ADDENDUM
[FreeTextEntry1] : I, Dr. Josh Griffin, personally performed the evaluation and management services for this established patient who presents today with a new problem/exacerbation of an existing condition.  The E/M includes conducting patient interview, detailed physical examination, assessing all new and exacerbated conditions, reviewing all investigations and establishing a new plan of care.  Today, my pulmonary fellow, was part of the evaluation and management.  He understands changes in the patient management.

## 2023-06-15 NOTE — ASSESSMENT
[FreeTextEntry1] : 6-12-23:\par \par It was a pleasure to see Rashmi in follow-up today.    Her respiratory issues are as follows:\par \par 1.   Interstitial lung abnormality (ISAAC)   \par The ground glass opacities have been classified as subpleural, non reticular. There is no increase in the size of opacities. I would label this as interstitial lung abnormality. \par \par -Echocardiogram done 3/10/21 showed normal pulmonary pressures, RVSP 27 mmHg\par -CT chest 10/10/22 findings are consistent with interstitial lung abnormality, subpleural, non reticular\par -Her HECTOR was elevated to 1:320 so we referred her to rheumatology. She saw Dr. Jimenez on 11/18 and CTD panel was negative.\par -She had repeat PFT today showing mildly decreased forced vital capacity (76%). The FEV1 is normal (85%). Diffusion capacity has shown a mild decline, 11.35 (51%) -> 9.56 (43%). Her 6MWT has decreased from 458 meters to 385 meters without significant desaturation. We will continue to keep an eye on this. Today the patient felt tired while walking.\par \par The differential diagnosis is broad:\par a. Autoimmune disease. She has several features that indicate this may be autoimmune related disease (elevated HECTOR in the past). She does not have any clinical evidence of autoimmune related interstitial lung disease.\par b. Hypersensitivity pneumonitis from down feathers, chemical sprays\par c. Smoking related interstitial lung disease. She has almost 40 PY smoking history. She quit smoking 4 months ago\par d. idiopathic pulmonary fibrosis would be lower down on the list as the CT findings are consistent with an alternative diagnosis to IPF. \par \par She had bronchoscopy on 1/7/21, which showed 1% lymphocytes in BAL fluid making the diagnosis of hypersensitivity pneumonitis much less likely. Of note, Envisia came back negative for honeycombing. This is consistent with interstitial lung abnormality most likely related to a smoking related lung disease. The possibilities include:\par - Respiratory bronchiolitis\par - Respiratory bronchiolitis-ILD\par - NSIP\par - Air space enlargement with fibrosis (AEF): Air space with enlargement with fibrosis is reported as a smoking related disease in individuals. Such individuals generally present with emphysema and minimal fibrosis. There may be reticulation that is seen along with bronchiolocentric emphysematous changes. Usually when patients stop smoking, the AEF does not progress. \par - Combined pulmonary fibrosis and emphysema. \par - Follicular bronchiolitis is unlikely although reported in smokers. People who are smokers and follicular bronchiolitis generally have thickened and dilated airways.\par \par CT scan continues to show peripheral reticulation with GGO in cranial-caudal gradient, unchanged from prior. PFTs are slightly improved from prior, but importantly, she has resumed smoking.\par \par Plan:\par -We have counseled her on the absolute necessity of smoking cessation\par -We will again send a detailed auto-immune workup as it has been >6 months since the last set and the serum markers may lag behind radiographic pulmonary manifestations of disease\par - We will continue to follow CT scans, PFT and 6MWT every 6 months, next due Nov 2023. We will look to see if there is a drop in FVC and TLC drop > than 10% or a drop in diffusion capacity > than 15%. If we find a significant reduction, we will consider starting her on anti fibrotics (Ofev or Esbriet). We will request histogram and texture analysis on the CT to be able to more precisely quantify the extent of ISAAC.\par -Based on the above testing, we will discuss if there is a need for treatment with anti fibrotics (pirfenidone, nintedanib) \par -She was counseled against using chemical sprays, which she sprays on a daily basis.\par -She got rid of feather comforter and mattress cover\par -She has gotten rid of rugs in her home\par \par 2. Hemoptysis\par Rashmi described small amounts of hemoptysis (<1 tsp) on Tuesday morning. After the initial episode, there were small amounts of blood. Bleeding has not recurred except for a drop or two noted when the patient spits out. We have evaluated her CT of the chest. There are no lesions noted on the CT that may explain the hemoptysis. I suspect that Rashmi had acute bronchitis and coughed out some blood. Clinically she is not in respiratory distress, not febrile and does not appear to be toxic. She is a reliable patient. At this stage, I would continue to observe her. It has already been 2 days since the time she had the hemoptysis. She has been advised to come to ER should she have a recurrence. At that time, we would consider doing a bronchoscopy and endobronchial evaluation. We will recheck RVP and COVID PCR. \par \par We have discussed the option of doing a bronchoscopy at this stage, which we would recommend only if the patient was very anxious or nervous. \par \par She has had no additional episodes of hemoptysis since her prior visit. \par \par 3. Active smoker\par We had a detailed discussion about the effects of smoking on ISAAC, including its development and progression.  We discussed a study in CHEST that the incidence of lung cancer in the setting of ISAAC may be higher. She restarted smoking during the pandemic, 1/2 pack per day, but stopped in September 2021. On CT, there is dilatation of the airways, thickening of the walls along with mosaic attenuation. This is separate from the ground glass opacities. In the absence of a reduced FEV1/FVC, it is difficult to make a diagnosis of emphysema. However it must be pointed out that with an increase in elastins of the lung (scarring and fibrosis), the FEV1/FVC ratio may be deceptively normal.\par \par 4. Pulmonary nodule\par In a person who has interstitial lung disease, it is important to monitor for changes on CT. Right upper lobe nodule is stable, 5 mm on chest CT done 8/12/21. We will hold off continuing her enrollment in the lung cancer screening program as we work up her interstitial lung disease. Results of CT scan done 6/12/23 are pending.\par \par 5.  ARNOL\par Risk factors included Mallampati score of IV and possible snoring.  Diagnosed with ARNOL but had difficulty tolerating CPAP.  She is followed by Dr. Streeter. We recommended she make a follow up appointment with Dr. Streeter so she can restart using her CPAP.\par \par 6. Cystic lesions\par There is a cyst seen in the lung. looking for nay increase in size or number of cystic lesions. These cysts can be seen with Sjogren's syndrome. She denies eye dryness. Generally she does not have dry mouth. CT chest 8/12/21 reviewed and was stable. Her HECTOR was elevated to 1:320 so we referred her to rheumatology. She saw Dr. Jimenez on 11/18 and CTD panel was negative. \par \par Return to clinic: 6 months

## 2023-06-15 NOTE — PROCEDURE
[FreeTextEntry1] : PFT 23\par FVC: 2.57 L (82%)\par FEV1: 2.06 L (87%)\par FEV1/FVC: 80%\par FEF 25-75%: 2.10 L/s (112%)\par TLC: 3.69 L (70%)\par RV/T%\par DLCO: 12.01 (54%)\par \par PFT 10/13/22\par FVC: 2.43 L (76%)\par FEV1: 2.03 L (85%)\par FEV1/FVC: 84%\par FEF 25-75%: 2.31 L/s (120%)\par TLC: 3.83 L (73%)\par RV/T%\par DLCO: 9.56 (43%)\par \par 6MWT 10/13/22: 385 meters, SpO2 96% -> 94% \par \par PFT 22\par FVC: 2.80 L (88%)\par FEV1: 2.17 L (91%)\par FEV1/FVC: 78%\par FEF 25-75%: 1.91 L/s (99%)\par TLC: 3.96 L (75%)\par RV/T%\par DLCO: 11.35 (51%)\par \par 6MWT 22\par Patient walked 458 meters during a 6 minute walk test.\par Resting O2 saturation was 97% on RA.  Heart rate 61 bpm.\par End test O2 saturation was 94% on RA.  Heart rate 96 bpm. \par \par PFT 21\par FVC: 2.70 L (85%)\par FEV1: 2.07 L (87%)\par FEV1/FVC: 77%\par FEF 25-75%: 1.73 L/s (90%)\par TLC: 3.53 L (67%)\par RV/T%\par DLCO: 11.19 (50%)\par \par 6MWT 21\par Patient walked 450 meters during a 6 minute walk test.\par Resting O2 saturation was 96% on RA.  Heart rate 60 bpm.\par End test O2 saturation was 94% on RA.  Heart rate 79 bpm.  Ulises scale end of test: 0.5\par \par PFT 10/13/21\par FVC: 2.71 L (84%)--> 2.76 L (86%) \par FEV1: 2.18 L (90%)--> 2.20 L (90%) \par FEV1/FVC: 81%--> 80%\par LUG38-22%: 2.29 L/s (116%)--> 2.24 L/s (114%)\par TLC: 3.76 L (71%)\par RV/T.85%\par DLCO: 10.68 (48%)\par \par DLCO: 15.0 (69%) -> DLCO: 17.7 (82%) -> 11.0 (52%) -> 12.55 (61%) - 10.68 (48%)\par \par 6MWT 10/13/21\par Patient walked 544 meters during a 6 minute walk test.\par Resting O2 saturation was 97% on RA. Heart rate 67 bpm.\par End test O2 saturation was 93% on RA. Heart rate 99 bpm. \par \par CT chest 21\par IMPRESSION:\par Stable extent and distribution of interstitial lung changes of peripheral reticular and groundglass opacities, which can be seen in NSIP. \par \par PFT 21\par FVC: 2.61 L (87%)\par FEV1: 2.11 L (91%)\par FEV1/FVC: 81%\par FEF 25-75%: 2.46 L/s (129%)\par TLC: 3.81 L (72%)\par RV/T%\par DLCO: 12.55 (61%)\par Normal spirometry.\par mildly decreased lung volumes\par mildly decreased diffusion capacity\par \par PFT 3/4/21\par FVC: 2.64 L (82%)\par FEV1: 2.12 L (87%)\par FEV1/FVC: 80%\par FEF 25-75%: 2.22 L/s (112%)\par TLC:3.86 L (74%)\par RV/T%\par DLCO: 11.0 (52%)\par Normal spirometry\par mildly decreased lung volume\par moderate diffusion capacity\par \par 6MWT 3/4/21\par Patient walked 487 meters during a 6 minute walk test.\par Resting O2 saturation was 96% on RA. Heart rate 64 bpm.\par End test O2 saturation was 96% on RA. Heart rate 81 bpm. Ulises scale end of test - 1 "very slight"\par This signifies normal walk distance and no desaturation\par \par CT chest 2021:\par Impression:\par 1. 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 5 mm solid nodule right upper lobe-stable.\par \par EXAM: CT LDCT LUNG CA SCRN ANNUAL PROCEDURE DATE: 2020 \par Additional imaging was performed in the prone position complete evaluation of the dependent regions of the lungs. \par Lungs and airways: Image numbers for description of nodule location refer to thin section axial series number 4. \par 1.1 cm air cyst within the anterior segment of the right upper lobe (image 155). There are scattered solid subcentimeter and micronodules as well as benign perifissural lymph nodes. The largest solid nodule measures 5 mm, is noted within the right upper lobe laterally (image 165). There is a peripheral predominantly lower lung zone reticular and groundglass pattern which persists on prone imaging. There may be some subpleural sparing. There is temporal homogeneity. There is no evidence of pulmonary fibrosis. These constellations of findings are suggestive of NSIP. \par Trachea and central bronchi patent. \par Pleura: The pleural spaces are clear. \par Base of neck, mediastinum and heart: The thyroid gland is normal. No mediastinal, hilar or axillary lymphadenopathy is seen. The heart and pericardium are within normal limits. Mild calcification of the aortic valve. Small calcified atheromatous plaque formation is noted throughout the thoracic aorta and proximal abdominal aorta and side branches. \par Coronary artery calcification: None \par Soft tissues: Normal. \par Abdomen: Small type I hiatal hernia. \par Bones: Mild to moderate multilevel degenerative disc disease. Small benign-appearing bone island involving T5 \par Impression: \par 1. Peripheral reticular and groundglass lower lung zone pattern with temporal homogeneity which persists on prone imaging. There may be some mild subpleural sparing. There is no pulmonary fibrosis. 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 Abbreviated differential includes LIP and DIP. \par 2. Scattered solid subcentimeter and micronodules measuring up to 5 mm within the right upper lobe. \par Lung-RADS category: 2S - Benign appearance or behavior. Nodules with very low likelihood of becoming a clinically active cancer due to size or lack of growth. Probability of malignancy < 1%. \par \par Spirometry, DLCO 10/3/19:\par FVC: 2.84 L (96%)\par FEV1: 2.23 L (95%)\par FEV1/FVC: 79%\par [id number]%: 2.12 L/s (88%)\par DLCO: 17.7 (82%)\par Normal spirometry. Normal diffusion capacity.\par \par CPAP titration 19: recommended 10 cm H2O, autoPAP was ordered\par PSG 3/13/19: AHI CMS 32, min SpO2 79%, time <= 88% 1.2 min\par \par PFT, 6MWT 19:\par FVC: 2.72 L (92%)\par FEV1: 2.38 L (101%)\par FEV1/FVC: 87%\par WLF59-02%: 3.20 L/s (133%)\par T.23 L (84%)\par RV/T%\par DLCO: 15.0 (69%)\par 6MWT: 439 meters, SpO2 start: 97% --> SpO2 end: 95%\par Normal spirometry. Lung volumes normal. Mildly reduced diffusion capacity. Normal walk distance with no desaturation. \par \par EXAM: CT LDCT LUNG CA SCRN BASELINE \par *** ADDENDUM 2019 *** \par Addendum: \par Abbreviated differential for these peripheral groundglass opacities in the setting of smoking must extend to include DIP. Bronchoscopic correlation and additional HRCT imaging to include end inspiration/end expiration is suggested. \par *** END OF ADDENDUM 2019 *** \par \par PROCEDURE DATE: 2019 \par INTERPRETATION: CT scan of the chest without intravenous contrast, using low-dose lung cancer screening protocol \par History: 65-year-old female nonsmoker with a 37.5 pack year history of smoking \par Comparison: None. \par Findings: \par Lungs and airways: Image numbers for description of nodule location refer to thin section axial series number 4. Scattered calcified granulomata. 4 mm nodule within the right upper lobe (image 166). Is a mild peripheral reticular and groundglass opacities which demonstrates temporal homogeneity with a suggestion of some subpleural sparing involving the upper and lower lung zones slightly more pronounced inferiorly. There are some scattered tree-in-bud centrilobular micronodules. No significant air-trapping is noted. 9 mm air cyst is noted within the lingula (image 167) \par Trachea and central bronchi patent. \par Pleura: The pleural spaces are clear. \par Base of neck, mediastinum and heart: The thyroid gland demonstrates coarse parenchymal calcifications. No mediastinal, hilar or axillary lymphadenopathy is seen. The heart and pericardium are within normal limits. \par Mild calcified atheromatous plaque formation involving the thoracic aorta most pronounced in the arch and proximal abdominal regions \par Coronary artery calcification: None \par Soft tissues: Normal. \par Abdomen: This study was performed without contrast and with lower than standard dose. These factors reduce sensitivity for detection of small lesions in the upper abdomen. Given these technical limitations, no focal lesion is seen within the visualized organs. \par Bones: Mild age-appropriate degenerative change. Small sclerotic focus involving the T5 vertebral body compatible with a probable bone island. \par Impression: \par 1. Scattered tree-in-bud centrilobular micronodules with mild small airway inflammation. No significant air-trapping is identified. \par 2. Mild peripheral reticular and groundglass opacities involving all 5 pulmonary lobes with a slightly lower lung zone predilection. There is relative temporal homogeneity with possibly some subpleural sparing. No \par pulmonary fibrosis. Abbreviated differential includes an NSIP pattern. \par Respiratory bronchiolitis although centrilobular groundglass nodules are not well-visualized, and LIP cannot be excluded. Further imaging to include HRCT to be performed at end inspiration/end expiration is suggested for more complete evaluation \par 3. 4 mm nodule within the right upper lobe. Scattered calcified granulomata. \par Lung-RADS category: 2S - Benign appearance or behavior. Nodules with very low likelihood of becoming a clinically active cancer due to size or lack of growth. Probability of malignancy < 1%. \par Recommendation: \par 2S - Continue annual screening with LDCT in 12 months.

## 2023-06-15 NOTE — PHYSICAL EXAM
[TextBox_54] : Normal S1S2, no murmurs, rubs or gallops [TextBox_99] : no cyanosis, no clubbing, no articular manifestations, no thickening of the skin, mild atrophy of the interosseus muscles, radial pulses equal bilaterally, no raynauds phenomenon [TextBox_105] : pulses equal bilaterally, no raynauds phenomenon. Trace pitting pedal edema on the left side (recent sprain)

## 2023-06-15 NOTE — DISCUSSION/SUMMARY
[FreeTextEntry1] : ATTENDING'S SUMMARY:\par 6-12-23:\par no pallor, no icterus\par no JVD, EJ is visible but is filling from above, no hepatojugular reflux, no HSM\par no cervical adenopathy, no supraclavicular adenopathy\par normal S1S2, no murmurs, rubs or gallops\par no dullness, good air entry bilaterally, no wheezing, rhonchi or crackles\par no cyanosis, no clubbing, no articular manifestations, no thickening of the skin, mild atrophy of the interosseus muscles, radial pulses equal bilaterally, no raynauds phenomenon\par Age related solar keratosis \par Trace pitting pedal edema on the left side (recent sprain)

## 2023-06-15 NOTE — HISTORY OF PRESENT ILLNESS
[Former] : former [TextBox_4] : Pt is 67 yo F with PMH TIA, left vertebral dissection, HTN, stable pre-diabetes, left ICA stenosis, GERD, duodenal ulcer, arthritis.\par 37 pack year history of smoking, still currently smoking. \par \par First visit 2/19/19: Has tried patches and lozenges in the past. Wants to start Chantix, was prescribed by Dr. Jacob last week but she states she couldn't afford the co-pay. \par Last cigarette was earlier today. Smoking roughly 10 cig/week. \par \par Has mild cough, cough is dry. \par Denies chest tightness, wheezing, recurrent respiratory tract infections. \par No ED visits or hospitalizations for breathing. \par No diagnosis of asthma or PNA in the past. No personal history of blood clots or cancers. \par Currently no pets. Had a parakeet for 8 years, 25 years ago. \par She has down throw pillows. She has a 10-year old carpet in her bedroom. \par No mold in the homes.\par She had a home in [place] [id number] where radon levels were borderline elevated.\par No other inhalational exposures she is aware of. \par She has a history of snoring.\par \par FHx: sister breast CA (diagnosed around age 65 yo)\par PCN: penicillin (angioedema)\par ****\par \par 6-12-23:\par Breathing is good. No SOB when walking or exercising [facility] the [facility] center.  No more episodes of hemoptysis. \par Her son is sick in [place]. She is feeling slightly depressed. Has been smoking occasionally as she is stressed about her son. \par \par 10-13-22:\par Mild hemoptysis (< 1tsbp) started Tuesday morning. She went to the ER. Never had a fever. She is still coughing, but not as bad. Chest x-ray in the hospital was normal. Negative RVP and negative.\par Hemoptysis has not recurred, but she reports drop of blood when she coughs.\par covid swab x2. \par Her balance is off\par She is dizzy

## 2023-06-16 ENCOUNTER — NON-APPOINTMENT (OUTPATIENT)
Age: 73
End: 2023-06-16

## 2023-06-16 LAB
CENTROMERE IGG SER-ACNC: <0.2 AL
DSDNA AB SER-ACNC: <12 IU/ML
ENA RNP AB SER IA-ACNC: <0.2 AL
ENA SM AB SER IA-ACNC: <0.2 AL
RNA POLYMERASE III IGG: 14 UNITS

## 2023-06-28 ENCOUNTER — APPOINTMENT (OUTPATIENT)
Dept: INTERNAL MEDICINE | Facility: CLINIC | Age: 73
End: 2023-06-28
Payer: MEDICARE

## 2023-06-28 PROCEDURE — 99441: CPT

## 2023-07-07 ENCOUNTER — NON-APPOINTMENT (OUTPATIENT)
Age: 73
End: 2023-07-07

## 2023-07-07 LAB — COMPLEMENT, ALTERNATE PATHWAY (AH50): NORMAL

## 2023-07-13 ENCOUNTER — NON-APPOINTMENT (OUTPATIENT)
Age: 73
End: 2023-07-13

## 2023-07-13 ENCOUNTER — TRANSCRIPTION ENCOUNTER (OUTPATIENT)
Age: 73
End: 2023-07-13

## 2023-07-13 LAB
EJ AB SER QL: NEGATIVE
ENA JO1 AB SER IA-ACNC: <20 UNITS
ENA PM/SCL AB SER-ACNC: <20 UNITS
ENA SM+RNP AB SER IA-ACNC: <20 UNITS
ENA SS-A IGG SER QL: <20 UNITS
FIBRILLARIN AB SER QL: NEGATIVE
KU AB SER QL: NEGATIVE
MDA-5 (P140)(CADM-140): <20 UNITS
MI2 AB SER QL: NEGATIVE
NXP-2 (P140): <20 UNITS
OJ AB SER QL: NEGATIVE
PL12 AB SER QL: NEGATIVE
PL7 AB SER QL: NEGATIVE
SRP AB SERPL QL: NEGATIVE
TIF GAMMA (P155/140): <20 UNITS
U2 SNRNP AB SER QL: NEGATIVE

## 2023-07-14 ENCOUNTER — TRANSCRIPTION ENCOUNTER (OUTPATIENT)
Age: 73
End: 2023-07-14

## 2023-07-31 ENCOUNTER — RX RENEWAL (OUTPATIENT)
Age: 73
End: 2023-07-31

## 2023-10-01 ENCOUNTER — NON-APPOINTMENT (OUTPATIENT)
Age: 73
End: 2023-10-01

## 2023-10-01 ENCOUNTER — TRANSCRIPTION ENCOUNTER (OUTPATIENT)
Age: 73
End: 2023-10-01

## 2023-10-18 ENCOUNTER — APPOINTMENT (OUTPATIENT)
Dept: OPHTHALMOLOGY | Facility: CLINIC | Age: 73
End: 2023-10-18
Payer: MEDICARE

## 2023-10-18 ENCOUNTER — NON-APPOINTMENT (OUTPATIENT)
Age: 73
End: 2023-10-18

## 2023-10-18 PROCEDURE — 92014 COMPRE OPH EXAM EST PT 1/>: CPT

## 2023-10-20 ENCOUNTER — APPOINTMENT (OUTPATIENT)
Dept: INTERNAL MEDICINE | Facility: CLINIC | Age: 73
End: 2023-10-20
Payer: MEDICARE

## 2023-10-20 VITALS
HEART RATE: 59 BPM | SYSTOLIC BLOOD PRESSURE: 138 MMHG | OXYGEN SATURATION: 96 % | WEIGHT: 175.5 LBS | HEIGHT: 66 IN | BODY MASS INDEX: 28.21 KG/M2 | DIASTOLIC BLOOD PRESSURE: 80 MMHG

## 2023-10-20 DIAGNOSIS — E04.1 NONTOXIC SINGLE THYROID NODULE: ICD-10-CM

## 2023-10-20 DIAGNOSIS — Z01.818 ENCOUNTER FOR OTHER PREPROCEDURAL EXAMINATION: ICD-10-CM

## 2023-10-20 PROCEDURE — 99214 OFFICE O/P EST MOD 30 MIN: CPT

## 2023-10-20 RX ORDER — DOXYCYCLINE HYCLATE 100 MG/1
100 CAPSULE ORAL
Qty: 14 | Refills: 0 | Status: DISCONTINUED | COMMUNITY
Start: 2022-12-09 | End: 2023-10-20

## 2023-10-20 RX ORDER — FLUTICASONE PROPIONATE 50 UG/1
50 SPRAY, METERED NASAL DAILY
Qty: 1 | Refills: 0 | Status: DISCONTINUED | COMMUNITY
Start: 2022-12-09 | End: 2023-10-20

## 2023-10-20 RX ORDER — UMECLIDINIUM BROMIDE AND VILANTEROL TRIFENATATE 62.5; 25 UG/1; UG/1
62.5-25 POWDER RESPIRATORY (INHALATION) DAILY
Qty: 1 | Refills: 1 | Status: DISCONTINUED | COMMUNITY
Start: 2022-06-07 | End: 2023-10-20

## 2023-10-20 RX ORDER — ALBUTEROL SULFATE 90 UG/1
108 (90 BASE) AEROSOL, METERED RESPIRATORY (INHALATION)
Qty: 1 | Refills: 0 | Status: DISCONTINUED | COMMUNITY
Start: 2022-01-10 | End: 2023-10-20

## 2023-10-20 RX ORDER — LORAZEPAM 0.5 MG/1
0.5 TABLET ORAL
Qty: 5 | Refills: 0 | Status: DISCONTINUED | COMMUNITY
Start: 2021-01-15 | End: 2023-10-20

## 2023-10-21 PROBLEM — E04.1 THYROID NODULE: Status: ACTIVE | Noted: 2018-10-15

## 2023-10-21 PROBLEM — Z01.818 PREOP EXAMINATION: Status: ACTIVE | Noted: 2022-11-17

## 2023-10-26 RX ORDER — POLYMYXIN B SULF/TRIMETHOPRIM 10000-1/ML
1 DROPS OPHTHALMIC (EYE)
Refills: 0 | Status: DISCONTINUED | OUTPATIENT
Start: 2023-10-30 | End: 2023-10-30

## 2023-10-26 RX ORDER — KETOROLAC TROMETHAMINE 0.5 %
1 DROPS OPHTHALMIC (EYE)
Refills: 0 | Status: DISCONTINUED | OUTPATIENT
Start: 2023-10-30 | End: 2023-10-30

## 2023-10-26 RX ORDER — TROPICAMIDE 1 %
1 DROPS OPHTHALMIC (EYE)
Refills: 0 | Status: DISCONTINUED | OUTPATIENT
Start: 2023-10-30 | End: 2023-10-30

## 2023-10-26 RX ORDER — PHENYLEPHRINE HCL 2.5 %
1 DROPS OPHTHALMIC (EYE)
Refills: 0 | Status: DISCONTINUED | OUTPATIENT
Start: 2023-10-30 | End: 2023-10-30

## 2023-10-26 RX ORDER — SODIUM CHLORIDE 9 MG/ML
1000 INJECTION, SOLUTION INTRAVENOUS
Refills: 0 | Status: DISCONTINUED | OUTPATIENT
Start: 2023-10-30 | End: 2023-10-30

## 2023-10-27 NOTE — ASU PATIENT PROFILE, ADULT - URINARY CATHETER
----- Message from Aj Conrad MD sent at 8/20/2021 11:36 AM CDT -----  Call:Diabetes control is gotten a little bit worse.  Recommend increasing Metformin.  Take 2 pills, a total of 1000 mg, both in the morning and at night.  We should recheck your labs in 6 weeks.   no

## 2023-10-27 NOTE — ASU PATIENT PROFILE, ADULT - NS PREOP UNDERSTANDS INFO
Spoke to patient to be NPO/NO solid  foods  after  12MN, allow to drink water  till 3 am , dress comfortable, leave all valuable at home,   no jewelry , non smoking , Bring ID photo and insurance cards,,  escort arranged , address and telephone given to patient/yes

## 2023-10-27 NOTE — ASU PATIENT PROFILE, ADULT - PATIENT KNOW
History and Physical


Date of Admission


Date of Admission


DATE: 1/16/22 


TIME: 17:01





Source


Source:  Chart review, Patient





History of Present Illness


History of Present Illness


  Ms. Hazel is a 56  year old admit for nausea and abd pain.   


Mult ER visits,   being treated for colitis flare  Flagyl and Cipro, 

sulfasalamine prednisone. 


she has not been able to take meds, poor po intake,  nauisea and pain, 


she feels improved since ER intervnetion,  huge steroid dose,  meds, fluids


pain was 8, now 5 of 10


 She is vaccinated for COVID-19.





Past Medical History


Cardiovascular:  No pertinent hx


Pulmonary:  No pertinent hx


GI:  Irritable bowel disease (UC)


ENT:  No pertinent hx


Endocrine:  No pertinent hx


Dermatology:  No pertinent hx





Past Surgical History


Past Surgical History:  No pertinent history





Family History


Family History:  No Significant





Social History


Smoke:  No


ALCOHOL:  rare


Drugs:  None





Current Problem List


Problem List


Problems


Medical Problems:


(1) Colitis


Status: Acute  





(2) Hypokalemia


Status: Acute  





(3) Intractable nausea and vomiting


Status: Acute  











Current Medications


Current Medications





Current Medications


Sodium Chloride 1,000 ml @  1,000 mls/hr Q1H IV  Last administered on 1/16/22at 

13:10;  Start 1/16/22 at 12:00;  Stop 1/16/22 at 12:59;  Status DC


Fentanyl Citrate (Fentanyl 2ml Vial) 50 mcg 1X  ONCE IVP  Last administered on 

1/16/22at 12:43;  Start 1/16/22 at 12:00;  Stop 1/16/22 at 12:14;  Status DC


Prochlorperazine Edisylate (Compazine) 5 mg 1X  ONCE IVP  Last administered on 

1/16/22at 12:43;  Start 1/16/22 at 12:00;  Stop 1/16/22 at 12:14;  Status DC


Metronidazole 100 ml @  100 mls/hr 1X  ONCE IV  Last administered on 1/16/22at 

12:59;  Start 1/16/22 at 12:00;  Stop 1/16/22 at 12:59;  Status DC


Ciprofloxacin/ Dextrose 200 ml @  200 mls/hr 1X  ONCE IV  Last administered on 

1/16/22at 12:59;  Start 1/16/22 at 12:00;  Stop 1/16/22 at 12:59;  Status DC


Methylprednisolone Sodium Succinate (SOLU-Medrol 125MG VIAL) 125 mg 1X  ONCE IV 

Last administered on 1/16/22at 13:10;  Start 1/16/22 at 12:30;  Stop 1/16/22 at 

12:41;  Status DC


Potassium Chloride/Water 100 ml @  50 mls/hr Q2H IV  Last administered on 

1/16/22at 15:13;  Start 1/16/22 at 12:45;  Stop 1/16/22 at 16:44;  Status DC


Metronidazole 100 ml @  100 mls/hr Q8HRS IV ;  Start 1/16/22 at 22:00


Ciprofloxacin/ Dextrose 200 ml @  200 mls/hr Q12HR IV ;  Start 1/16/22 at 21:00


Potassium Chloride/Dextrose/ Sod Cl 1,000 ml @  125 mls/hr Q8H IV ;  Start 

1/16/22 at 16:30


Fentanyl Citrate (Fentanyl 2ml Vial) 50 mcg PRN Q2HR  PRN IVP MODERATE TO SEVERE

PAIN;  Start 1/16/22 at 16:45


Ondansetron HCl (Zofran) 4 mg PRN Q8HRS  PRN IVP NAUSEA/VOMITING;  Start 1/16/22

at 16:45


Influenza Virus Vaccine Quadrival (Flulaval Quad [id number] Syringe) 0.5 ml ONCE 

ONCE VAX IM ;  Start 1/16/22 at 17:00;  Stop 1/16/22 at 17:01;  Status UNV





Allergies


Allergies:  


Coded Allergies:  


     No Known Drug Allergies (Unverified , 1/16/22)





ROS


General:  YES: Chills, Fatigue


PSYCHOLOGICAL ROS:  No: Anxiety, Behavioral Disorder, Concentration difficultie,

Decreased libido, Depression, Disorientation, Hallucinations, Hostility, 

Irritablity, Memory difficulties, Mood Swings, Obsessive thoughts, Physical 

abuse, Sexual abuse, Sleep disturbances, Suicidal ideation, Other


Eyes:  No Blurry vision, No Decreased vision, No Double vision, No Dry eyes, No 

Excessive tearing, No Eye Pain, No Itchy Eyes, No Loss of vision, No 

Photophobia, No Scotomata, No Uses contacts, No Uses glasses, No Other


HEENT:  No: Heacaches, Visual Changes, Hearing change, Nasal congestion, Nasal 

discharge, Oral lesions, Sinus pain, Sore Throat, Epistaxis, Sneezing, Snoring, 

Tinnitus, Vertigo, Vocal changes, Other


Respiratory:  No: Cough, Hemoptysis, Orthopnea, Pleuritic Pain, Shortness of 

breath, SOB with excertion, Sputum Changes, Stridor, Tachypnea, Wheezing, Other


Cardiovascular:  No Chest Pain, No Palpitations, No Orthopnea, No Paroxysmal 

Noc. Dyspnea, No Edema, No Lt Headedness, No Other


Gastrointestinal:  Yes Nausea, Yes Abdominal Pain, Yes Diarrhea


Genitourinary:  No Dysuria, No Frequency, No Incontinence, No Hematuria, No 

Retention, No Discharge, No Urgency, No Pain, No Flank Pain, No Other, No , No ,

No , No , No , No , No 


Musculoskeletal:  No Gait Disturbance, No Joint Pain, No Joint Swelling, No 

Muscle Pain, No Muscular Weakness, No Pain In:, No Swelling In:, No Other


Neurological:  No Behavorial Changes, No Bowel/Bladder ControlChng, No Co

nfusion, No Dizziness, No Gait Disturbance, No Headaches, No Impaired 

Coord/balance, No Memory Loss, No Numbness/Tingling, No Seizures, No Speech 

Problems, No Tremors, No Visual Changes, No Weakness, No Other


Skin:  Yes Dry Skin; 


   No Eczema, No Hair Changes, No Lumps, No Mole Changes, No Mottling, No Nail 

Changes, No Pruritus, No Rash, No Skin Lesion Changes, No Other, No Acne





Physical Exam


General:  Alert, Oriented X3, Cooperative, No acute distress, mild distress


HEENT:  PERRLA, EOMI


Lungs:  Clear to auscultation, Normal air movement


Heart:  S1S2, RRR, no gallops, murmurs


Abdomen:  Soft, No masses


Rectal Exam:  not examined


Extremities:  No clubbing, No edema


Skin:  No significant lesion


Neuro:  Normal gait, Normal speech, Sensation intact, Cranial nerves 3-12 NL


Psych/Mental Status:  Mental status NL, Mood NL





Vitals


Vitals





Vital Signs








  Date Time  Temp Pulse Resp B/P (MAP) Pulse Ox O2 Delivery O2 Flow Rate FiO2


 


1/16/22 16:51      Room Air  


 


1/16/22 13:50  82  131/64 (86) 95   


 


1/16/22 12:43   18     


 


1/16/22 12:09 98.3       





 98.3       











Labs


Labs





Laboratory Tests








Test


 1/16/22


12:00 1/16/22


13:00


 


White Blood Count


 19.1 x10^3/uL


(4.0-11.0) 





 


Red Blood Count


 4.60 x10^6/uL


(3.50-5.40) 





 


Hemoglobin


 13.5 g/dL


(12.0-15.5) 





 


Hematocrit


 39.1 %


(36.0-47.0) 





 


Mean Corpuscular Volume 85 fL ()  


 


Mean Corpuscular Hemoglobin 29 pg (25-35)  


 


Mean Corpuscular Hemoglobin


Concent 34 g/dL


(31-37) 





 


Red Cell Distribution Width


 13.2 %


(11.5-14.5) 





 


Platelet Count


 375 x10^3/uL


(140-400) 





 


Neutrophils (%) (Auto) 89 % (31-73)  


 


Lymphocytes (%) (Auto) 7 % (24-48)  


 


Monocytes (%) (Auto) 4 % (0-9)  


 


Eosinophils (%) (Auto) 0 % (0-3)  


 


Basophils (%) (Auto) 0 % (0-3)  


 


Neutrophils # (Auto)


 17.0 x10^3/uL


(1.8-7.7) 





 


Lymphocytes # (Auto)


 1.3 x10^3/uL


(1.0-4.8) 





 


Monocytes # (Auto)


 0.7 x10^3/uL


(0.0-1.1) 





 


Eosinophils # (Auto)


 0.1 x10^3/uL


(0.0-0.7) 





 


Basophils # (Auto)


 0.0 x10^3/uL


(0.0-0.2) 





 


Segmented Neutrophils % 59 % (35-66)  


 


Band Neutrophils % 12 % (0-9)  


 


Lymphocytes % 28 % (24-48)  


 


Monocytes % 1 % (0-10)  


 


Toxic Granulation Mod  


 


Platelet Estimate


 Adequate


(ADEQUATE) 





 


Sodium Level


 136 mmol/L


(136-145) 





 


Potassium Level


 2.9 mmol/L


(3.5-5.1) 





 


Chloride Level


 99 mmol/L


() 





 


Carbon Dioxide Level


 30 mmol/L


(21-32) 





 


Anion Gap 7 (6-14)  


 


Blood Urea Nitrogen 8 mg/dL (7-20)  


 


Creatinine


 1.1 mg/dL


(0.6-1.0) 





 


Estimated GFR


(Cockcroft-Gault) 51.2 


 





 


BUN/Creatinine Ratio 7 (6-20)  


 


Glucose Level


 135 mg/dL


(70-99) 





 


Calcium Level


 8.2 mg/dL


(8.5-10.1) 





 


Magnesium Level


 2.1 mg/dL


(1.8-2.4) 





 


Total Bilirubin


 0.4 mg/dL


(0.2-1.0) 





 


Aspartate Amino Transf


(AST/SGOT) 13 U/L (15-37) 


 





 


Alanine Aminotransferase


(ALT/SGPT) 29 U/L (14-59) 


 





 


Alkaline Phosphatase


 70 U/L


() 





 


Troponin I High Sensitivity 16 ng/L (4-50)  


 


Total Protein


 7.0 g/dL


(6.4-8.2) 





 


Albumin


 2.7 g/dL


(3.4-5.0) 





 


Albumin/Globulin Ratio 0.6 (1.0-1.7)  


 


Lipase


 106 U/L


() 





 


SARS-CoV-2 Antigen (Rapid)


 


 Negative


(NEGATIVE)








Laboratory Tests








Test


 1/16/22


12:00 1/16/22


13:00


 


White Blood Count


 19.1 x10^3/uL


(4.0-11.0) 





 


Red Blood Count


 4.60 x10^6/uL


(3.50-5.40) 





 


Hemoglobin


 13.5 g/dL


(12.0-15.5) 





 


Hematocrit


 39.1 %


(36.0-47.0) 





 


Mean Corpuscular Volume 85 fL ()  


 


Mean Corpuscular Hemoglobin 29 pg (25-35)  


 


Mean Corpuscular Hemoglobin


Concent 34 g/dL


(31-37) 





 


Red Cell Distribution Width


 13.2 %


(11.5-14.5) 





 


Platelet Count


 375 x10^3/uL


(140-400) 





 


Neutrophils (%) (Auto) 89 % (31-73)  


 


Lymphocytes (%) (Auto) 7 % (24-48)  


 


Monocytes (%) (Auto) 4 % (0-9)  


 


Eosinophils (%) (Auto) 0 % (0-3)  


 


Basophils (%) (Auto) 0 % (0-3)  


 


Neutrophils # (Auto)


 17.0 x10^3/uL


(1.8-7.7) 





 


Lymphocytes # (Auto)


 1.3 x10^3/uL


(1.0-4.8) 





 


Monocytes # (Auto)


 0.7 x10^3/uL


(0.0-1.1) 





 


Eosinophils # (Auto)


 0.1 x10^3/uL


(0.0-0.7) 





 


Basophils # (Auto)


 0.0 x10^3/uL


(0.0-0.2) 





 


Segmented Neutrophils % 59 % (35-66)  


 


Band Neutrophils % 12 % (0-9)  


 


Lymphocytes % 28 % (24-48)  


 


Monocytes % 1 % (0-10)  


 


Toxic Granulation Mod  


 


Platelet Estimate


 Adequate


(ADEQUATE) 





 


Sodium Level


 136 mmol/L


(136-145) 





 


Potassium Level


 2.9 mmol/L


(3.5-5.1) 





 


Chloride Level


 99 mmol/L


() 





 


Carbon Dioxide Level


 30 mmol/L


(21-32) 





 


Anion Gap 7 (6-14)  


 


Blood Urea Nitrogen 8 mg/dL (7-20)  


 


Creatinine


 1.1 mg/dL


(0.6-1.0) 





 


Estimated GFR


(Cockcroft-Gault) 51.2 


 





 


BUN/Creatinine Ratio 7 (6-20)  


 


Glucose Level


 135 mg/dL


(70-99) 





 


Calcium Level


 8.2 mg/dL


(8.5-10.1) 





 


Magnesium Level


 2.1 mg/dL


(1.8-2.4) 





 


Total Bilirubin


 0.4 mg/dL


(0.2-1.0) 





 


Aspartate Amino Transf


(AST/SGOT) 13 U/L (15-37) 


 





 


Alanine Aminotransferase


(ALT/SGPT) 29 U/L (14-59) 


 





 


Alkaline Phosphatase


 70 U/L


() 





 


Troponin I High Sensitivity 16 ng/L (4-50)  


 


Total Protein


 7.0 g/dL


(6.4-8.2) 





 


Albumin


 2.7 g/dL


(3.4-5.0) 





 


Albumin/Globulin Ratio 0.6 (1.0-1.7)  


 


Lipase


 106 U/L


() 





 


SARS-CoV-2 Antigen (Rapid)


 


 Negative


(NEGATIVE)











VTE Prophylaxis Ordered


VTE Prophylaxis Devices:  No


VTE Pharmacological Prophylaxi:  Yes





Assessment/Plan


Assessment/Plan


ulcerative colitis flare, 


IV steroids given in Er,  will return to normal 40 prednisone


IV abx,  IV fluid


hypkalemia, IV given,  replace with d5 half 20


clear liquids as able





Justifications for Admission


Other Justification














SHEKHAR RAMIREZ MD                 Jan 16, 2022 17:08 yes

## 2023-10-30 ENCOUNTER — NON-APPOINTMENT (OUTPATIENT)
Age: 73
End: 2023-10-30

## 2023-10-30 ENCOUNTER — OUTPATIENT (OUTPATIENT)
Dept: OUTPATIENT SERVICES | Facility: HOSPITAL | Age: 73
LOS: 1 days | Discharge: HOME | End: 2023-10-30
Payer: MEDICARE

## 2023-10-30 ENCOUNTER — APPOINTMENT (OUTPATIENT)
Dept: OPHTHALMOLOGY | Facility: AMBULATORY SURGERY CENTER | Age: 73
End: 2023-10-30

## 2023-10-30 ENCOUNTER — TRANSCRIPTION ENCOUNTER (OUTPATIENT)
Age: 73
End: 2023-10-30

## 2023-10-30 VITALS
RESPIRATION RATE: 16 BRPM | DIASTOLIC BLOOD PRESSURE: 62 MMHG | TEMPERATURE: 98 F | SYSTOLIC BLOOD PRESSURE: 163 MMHG | OXYGEN SATURATION: 96 % | HEART RATE: 57 BPM

## 2023-10-30 VITALS
HEART RATE: 57 BPM | RESPIRATION RATE: 16 BRPM | DIASTOLIC BLOOD PRESSURE: 68 MMHG | SYSTOLIC BLOOD PRESSURE: 179 MMHG | HEIGHT: 66 IN | WEIGHT: 180.12 LBS | OXYGEN SATURATION: 97 % | TEMPERATURE: 97 F

## 2023-10-30 PROCEDURE — 66984 XCAPSL CTRC RMVL W/O ECP: CPT | Mod: RT

## 2023-10-30 DEVICE — LENS IOL TECNIS PROTEC ZCB00 14.5D
Type: IMPLANTABLE DEVICE | Site: RIGHT | Status: NON-FUNCTIONAL
Removed: 2023-10-30

## 2023-10-30 RX ORDER — CARVEDILOL PHOSPHATE 80 MG/1
1 CAPSULE, EXTENDED RELEASE ORAL
Qty: 0 | Refills: 0 | DISCHARGE

## 2023-10-30 RX ORDER — ACETAMINOPHEN 500 MG
1000 TABLET ORAL ONCE
Refills: 0 | Status: COMPLETED | OUTPATIENT
Start: 2023-10-30 | End: 2023-10-30

## 2023-10-30 RX ADMIN — Medication 1000 MILLIGRAM(S): at 13:40

## 2023-10-30 RX ADMIN — Medication 1000 MILLIGRAM(S): at 14:12

## 2023-10-30 NOTE — OPERATIVE REPORT - OPERATIVE RPOSRT DETAILS
SURGEON:  BRENDA JANG M.D.    DATE OF SURGERY:  10/30/23    SURGICAL PROCEDURE:  CATARACT REMOVAL WITH LENS IMPLANT    LATERALITY:  RIGHT EYE    COMPLEXITY:  NORMAL    IMPLANT USED:  ZCBOO 14.50 DIOPTERS    COMPLICATIONS:  NONE    ESTIMATED BLOOD LOSS: <1 cc    PROCEDURE:  THE PATIENT WAS BROUGHT TO THE OPERATING ROOM AFTER THE SURGICAL EYE WAS IDENTIFIED AND MARKED IN THE PREOPERATIVE HOLDING AREA.  ONCE IN THE O.R. THE PATIENT AND SURGICAL SITE WERE AGAIN IDENTIFIED BY ALL O.R. PERSONNEL DURING A FULL TIME OUT.   THE AREA ABOUT THE SURGICAL EYE WAS PREPPED AND DRAPED IN THE USUAL STERILE FASHION USING SOLUTIONS OF BETADINE AND ALCOHOL.   TOPICAL ANESTHESIA WAS INDUCED USING TETRACAINE.  A STERILE LID SPECULUM WAS PLACED BENEATH THE LIDS OF THE SURGICAL EYE.  IT WAS REMOVED PRIOR TO THE END OF THE PROCEDURE.  A 1 ML SUBCONJUNCTIVAL INJECTION OF LIDOCAINE 2% WITH EPINEPHRINE WAS ADMINISTERED SUPERIORLY AND A 4 MM AREA OF CONJUNCTIVA WAS OPENED AT THE LIMBUS USING A KYLE SCISSORS.   WET FIELD ELECTROCAUTERY WAS USED TO ACHIEVE HEMOSTASIS.   A SCLERAL INCISION WAS CREATED 1MM POSTERIOR TO THE SURGICAL LIMBUS USING A CRESCENT BLADE.   DISSECTION WAS TAKEN ANTERIORLY INTO CLEAR CORNEA WITHOUT ENTERING THE ANTERIOR CHAMBER.   NASAL AND TEMPORAL PERIPHERAL CORNEAL PARACENTESIS PORTS WERE CREATED WITH A 15 DEGREE SUPER BLADE.  INTRAOCULAR PRESERVATIVE FREE LIDOCAINE WAS INJECTED INTO THE ANTERIOR CHAMBER FOLLOWED BY INTRAOCULAR EPINEPHRINE.  AMVISC PLUS WAS INJECTED INTO THE ANTERIOR CHAMBER AND THE SCLERAL TUNNEL WAS COMPLETED USING A 2.4 MM BENT KERATOME.   A CONTINUOUS TEAR CAPSULORRHEXIS WAS THEN CREATED USING A CYSTITOME.   THIS WAS COMPLETED USING UTRATA FORCEPS.   HYDRODISSECTION WAN THEN PERFORMED.   A DIVIDE AND CONQUOR TECHNIQUE WAS THEN USED WITH THE PHACOEMULSIFICATION HANDPIECE TO REMOVE THE NUCLEUS OF THE CATARACT.   REMAINING CORTICAL FIBERS WERE REMOVED USING AUTOMATED IRRIGATION AND ASPIRATION.  THE CAPSULAR BAG WAS INSPECTED AND FOUND TO BE INTACT.  IT WAS FILLED WITH HEALON.  A POSTERIOR CHAMBER LENS WAS PLACED INTO THE CAPSULAR BAG AND ROTATED INTO POSITION.   IT WAS FOUND TO SPONTANEOUSLY CENTER.    REMAINING HEALON WAS REMOVED THOROUGHLY USING AUTOMATED IRRIGATION AND ASPIRATION.   THE SURGICAL WOUNDS WERE HYDRATED AND A SINGLE 10-0 NYLON STITCH WAS PLACED AT THE MAIN ENTRY SITE.  ALL WOUNDS WERE TESTED AND FOUND TO BE WATER TIGHT.   TOBRADEX EYE DROPS WERE PLACED ON THE CORNEA AND THE LID SPECULUM WAS REMOVED.  ERYTHROMYCIN OINTMENT WAS PLACED BENEATH A STERILE PATCH AND SHIELD.   THE PATIENT LEFT THE OPERATING ROOM IN STABLE CONDITION HAVING TOLERATED THE PROCEDURE WELL.

## 2023-10-30 NOTE — PRE-ANESTHESIA EVALUATION ADULT - NSANTHOSAYNRD_GEN_A_CORE
No. ARNOL screening performed.  STOP BANG Legend: 0-2 = LOW Risk; 3-4 = INTERMEDIATE Risk; 5-8 = HIGH Risk

## 2023-10-30 NOTE — ASU PREOP CHECKLIST - SURGICAL CONSENT
Case reivewed with Cullen Ceja, [facility] inpatient admission to Dr Silviano Urena service done

## 2023-10-31 ENCOUNTER — NON-APPOINTMENT (OUTPATIENT)
Age: 73
End: 2023-10-31

## 2023-10-31 ENCOUNTER — APPOINTMENT (OUTPATIENT)
Dept: OPHTHALMOLOGY | Facility: CLINIC | Age: 73
End: 2023-10-31
Payer: MEDICARE

## 2023-10-31 PROCEDURE — 99024 POSTOP FOLLOW-UP VISIT: CPT

## 2023-11-07 ENCOUNTER — NON-APPOINTMENT (OUTPATIENT)
Age: 73
End: 2023-11-07

## 2023-11-07 ENCOUNTER — APPOINTMENT (OUTPATIENT)
Dept: OPHTHALMOLOGY | Facility: CLINIC | Age: 73
End: 2023-11-07
Payer: MEDICARE

## 2023-11-07 PROCEDURE — 99024 POSTOP FOLLOW-UP VISIT: CPT

## 2023-11-09 RX ORDER — SODIUM CHLORIDE 9 MG/ML
1000 INJECTION, SOLUTION INTRAVENOUS
Refills: 0 | Status: DISCONTINUED | OUTPATIENT
Start: 2023-11-13 | End: 2023-11-13

## 2023-11-09 NOTE — ASU PATIENT PROFILE, ADULT - NS PREOP UNDERSTANDS INFO
No solid food/dairy/candy/gum after 10:00pm Sunday; water allowed before 05:00am Monday; patient to come with photo ID/insurance/credit card; no jewelries/contact lens/valuables; dress in comfortable clothes; no smoking/alcohol drinking/recreational drug use Sunday; escort to come with photo ID; address and callback number was given/yes

## 2023-11-09 NOTE — ASU PATIENT PROFILE, ADULT - FALL HARM RISK - UNIVERSAL INTERVENTIONS
Bed in lowest position, wheels locked, appropriate side rails in place/Call bell, personal items and telephone in reach/Instruct patient to call for assistance before getting out of bed or chair/Non-slip footwear when patient is out of bed/[place] to call system/Physically safe environment - no spills, clutter or unnecessary equipment/Purposeful Proactive Rounding/Room/bathroom lighting operational, light cord in reach

## 2023-11-13 ENCOUNTER — APPOINTMENT (OUTPATIENT)
Dept: OPHTHALMOLOGY | Facility: AMBULATORY SURGERY CENTER | Age: 73
End: 2023-11-13

## 2023-11-13 ENCOUNTER — NON-APPOINTMENT (OUTPATIENT)
Age: 73
End: 2023-11-13

## 2023-11-13 ENCOUNTER — OUTPATIENT (OUTPATIENT)
Dept: OUTPATIENT SERVICES | Facility: HOSPITAL | Age: 73
LOS: 1 days | Discharge: HOME | End: 2023-11-13
Payer: MEDICARE

## 2023-11-13 VITALS
RESPIRATION RATE: 16 BRPM | SYSTOLIC BLOOD PRESSURE: 141 MMHG | HEART RATE: 55 BPM | HEIGHT: 66 IN | WEIGHT: 179.24 LBS | TEMPERATURE: 98 F | DIASTOLIC BLOOD PRESSURE: 64 MMHG | OXYGEN SATURATION: 98 %

## 2023-11-13 VITALS
DIASTOLIC BLOOD PRESSURE: 60 MMHG | HEART RATE: 66 BPM | TEMPERATURE: 99 F | OXYGEN SATURATION: 97 % | SYSTOLIC BLOOD PRESSURE: 131 MMHG | RESPIRATION RATE: 16 BRPM

## 2023-11-13 DIAGNOSIS — Z98.49 CATARACT EXTRACTION STATUS, UNSPECIFIED EYE: Chronic | ICD-10-CM

## 2023-11-13 PROCEDURE — 66984 XCAPSL CTRC RMVL W/O ECP: CPT | Mod: LT,79

## 2023-11-13 DEVICE — LENS IOL TECNIS PROTEC ZCB00 15.0D
Type: IMPLANTABLE DEVICE | Site: LEFT | Status: NON-FUNCTIONAL
Removed: 2023-11-13

## 2023-11-13 RX ORDER — PANTOPRAZOLE SODIUM 20 MG/1
1 TABLET, DELAYED RELEASE ORAL
Qty: 0 | Refills: 0 | DISCHARGE

## 2023-11-13 RX ORDER — TROPICAMIDE 1 %
1 DROPS OPHTHALMIC (EYE)
Refills: 0 | Status: COMPLETED | OUTPATIENT
Start: 2023-11-13 | End: 2023-11-13

## 2023-11-13 RX ORDER — KETOROLAC TROMETHAMINE 0.5 %
1 DROPS OPHTHALMIC (EYE)
Refills: 0 | Status: COMPLETED | OUTPATIENT
Start: 2023-11-13 | End: 2023-11-13

## 2023-11-13 RX ORDER — ATORVASTATIN CALCIUM 80 MG/1
1 TABLET, FILM COATED ORAL
Qty: 0 | Refills: 0 | DISCHARGE

## 2023-11-13 RX ORDER — POLYMYXIN B SULF/TRIMETHOPRIM 10000-1/ML
1 DROPS OPHTHALMIC (EYE)
Refills: 0 | Status: COMPLETED | OUTPATIENT
Start: 2023-11-13 | End: 2023-11-13

## 2023-11-13 RX ORDER — METOPROLOL TARTRATE 50 MG
1 TABLET ORAL
Refills: 0 | DISCHARGE

## 2023-11-13 RX ORDER — TELMISARTAN 20 MG/1
1 TABLET ORAL
Qty: 0 | Refills: 0 | DISCHARGE

## 2023-11-13 RX ORDER — PHENYLEPHRINE HCL 2.5 %
1 DROPS OPHTHALMIC (EYE)
Refills: 0 | Status: COMPLETED | OUTPATIENT
Start: 2023-11-13 | End: 2023-11-13

## 2023-11-13 RX ORDER — TELMISARTAN 20 MG/1
0 TABLET ORAL
Refills: 0 | DISCHARGE

## 2023-11-13 RX ORDER — AMLODIPINE BESYLATE 2.5 MG/1
1 TABLET ORAL
Qty: 0 | Refills: 0 | DISCHARGE

## 2023-11-13 RX ORDER — ACETAMINOPHEN 500 MG
650 TABLET ORAL ONCE
Refills: 0 | Status: COMPLETED | OUTPATIENT
Start: 2023-11-13 | End: 2023-11-13

## 2023-11-13 RX ADMIN — Medication 650 MILLIGRAM(S): at 09:59

## 2023-11-13 RX ADMIN — Medication 1 DROP(S): at 07:40

## 2023-11-13 RX ADMIN — Medication 1 DROP(S): at 07:25

## 2023-11-13 RX ADMIN — Medication 650 MILLIGRAM(S): at 09:29

## 2023-11-13 RX ADMIN — Medication 1 DROP(S): at 07:30

## 2023-11-13 NOTE — OPERATIVE REPORT - OPERATIVE RPOSRT DETAILS
SURGEON:  BRENDA JANG M.D.    DATE OF SURGERY:  11/13/23    SURGICAL PROCEDURE:  CATARACT REMOVAL WITH LENS IMPLANT    LATERALITY:  LEFT EYE    COMPLEXITY:  NORMAL    IMPLANT USED:  ZCBOO 15.00 DIOPTERS    COMPLICATIONS:  NONE    ESTIMATED BLOOD LOSS: <1 cc    PROCEDURE:  THE PATIENT WAS BROUGHT TO THE OPERATING ROOM AFTER THE SURGICAL EYE WAS IDENTIFIED AND MARKED IN THE PREOPERATIVE HOLDING AREA.  ONCE IN THE O.R. THE PATIENT AND SURGICAL SITE WERE AGAIN IDENTIFIED BY ALL O.R. PERSONNEL DURING A FULL TIME OUT.   THE AREA ABOUT THE SURGICAL EYE WAS PREPPED AND DRAPED IN THE USUAL STERILE FASHION USING SOLUTIONS OF BETADINE AND ALCOHOL.   TOPICAL ANESTHESIA WAS INDUCED USING TETRACAINE.  A STERILE LID SPECULUM WAS PLACED BENEATH THE LIDS OF THE SURGICAL EYE.  IT WAS REMOVED PRIOR TO THE END OF THE PROCEDURE.  A 1 ML SUBCONJUNCTIVAL INJECTION OF LIDOCAINE 2% WITH EPINEPHRINE WAS ADMINISTERED SUPERIORLY AND A 4 MM AREA OF CONJUNCTIVA WAS OPENED AT THE LIMBUS USING A KYLE SCISSORS.   WET FIELD ELECTROCAUTERY WAS USED TO ACHIEVE HEMOSTASIS.   A SCLERAL INCISION WAS CREATED 1MM POSTERIOR TO THE SURGICAL LIMBUS USING A CRESCENT BLADE.   DISSECTION WAS TAKEN ANTERIORLY INTO CLEAR CORNEA WITHOUT ENTERING THE ANTERIOR CHAMBER.   NASAL AND TEMPORAL PERIPHERAL CORNEAL PARACENTESIS PORTS WERE CREATED WITH A 15 DEGREE SUPER BLADE.  INTRAOCULAR PRESERVATIVE FREE LIDOCAINE WAS INJECTED INTO THE ANTERIOR CHAMBER FOLLOWED BY INTRAOCULAR EPINEPHRINE.  AMVISC PLUS WAS INJECTED INTO THE ANTERIOR CHAMBER AND THE SCLERAL TUNNEL WAS COMPLETED USING A 2.4 MM BENT KERATOME.   A CONTINUOUS TEAR CAPSULORRHEXIS WAS THEN CREATED USING A CYSTITOME.   THIS WAS COMPLETED USING UTRATA FORCEPS.   HYDRODISSECTION WAN THEN PERFORMED.   A DIVIDE AND CONQUOR TECHNIQUE WAS THEN USED WITH THE PHACOEMULSIFICATION HANDPIECE TO REMOVE THE NUCLEUS OF THE CATARACT.   REMAINING CORTICAL FIBERS WERE REMOVED USING AUTOMATED IRRIGATION AND ASPIRATION.  THE CAPSULAR BAG WAS INSPECTED AND FOUND TO BE INTACT.  IT WAS FILLED WITH HEALON.  A POSTERIOR CHAMBER LENS WAS PLACED INTO THE CAPSULAR BAG AND ROTATED INTO POSITION.   IT WAS FOUND TO SPONTANEOUSLY CENTER.    REMAINING HEALON WAS REMOVED THOROUGHLY USING AUTOMATED IRRIGATION AND ASPIRATION.   THE SURGICAL WOUNDS WERE HYDRATED AND A SINGLE 10-0 NYLON STITCH WAS PLACED AT THE MAIN ENTRY SITE.  ALL WOUNDS WERE TESTED AND FOUND TO BE WATER TIGHT.   TOBRADEX EYE DROPS WERE PLACED ON THE CORNEA AND THE LID SPECULUM WAS REMOVED.  ERYTHROMYCIN OINTMENT WAS PLACED BENEATH A STERILE PATCH AND SHIELD.   THE PATIENT LEFT THE OPERATING ROOM IN STABLE CONDITION HAVING TOLERATED THE PROCEDURE WELL.

## 2023-11-14 ENCOUNTER — APPOINTMENT (OUTPATIENT)
Dept: OPHTHALMOLOGY | Facility: CLINIC | Age: 73
End: 2023-11-14
Payer: MEDICARE

## 2023-11-14 ENCOUNTER — NON-APPOINTMENT (OUTPATIENT)
Age: 73
End: 2023-11-14

## 2023-11-14 PROCEDURE — 99024 POSTOP FOLLOW-UP VISIT: CPT

## 2023-11-21 ENCOUNTER — APPOINTMENT (OUTPATIENT)
Dept: OPHTHALMOLOGY | Facility: CLINIC | Age: 73
End: 2023-11-21

## 2023-11-28 ENCOUNTER — APPOINTMENT (OUTPATIENT)
Dept: OPHTHALMOLOGY | Facility: CLINIC | Age: 73
End: 2023-11-28
Payer: MEDICARE

## 2023-11-28 ENCOUNTER — NON-APPOINTMENT (OUTPATIENT)
Age: 73
End: 2023-11-28

## 2023-11-28 PROCEDURE — 99024 POSTOP FOLLOW-UP VISIT: CPT

## 2023-12-08 ENCOUNTER — OUTPATIENT (OUTPATIENT)
Dept: OUTPATIENT SERVICES | Facility: HOSPITAL | Age: 73
LOS: 1 days | End: 2023-12-08
Payer: MEDICARE

## 2023-12-08 ENCOUNTER — APPOINTMENT (OUTPATIENT)
Dept: ULTRASOUND IMAGING | Facility: HOSPITAL | Age: 73
End: 2023-12-08
Payer: MEDICARE

## 2023-12-08 DIAGNOSIS — Z98.49 CATARACT EXTRACTION STATUS, UNSPECIFIED EYE: Chronic | ICD-10-CM

## 2023-12-08 PROCEDURE — 76536 US EXAM OF HEAD AND NECK: CPT

## 2023-12-08 PROCEDURE — 76536 US EXAM OF HEAD AND NECK: CPT | Mod: 26

## 2023-12-09 ENCOUNTER — TRANSCRIPTION ENCOUNTER (OUTPATIENT)
Age: 73
End: 2023-12-09

## 2023-12-11 ENCOUNTER — TRANSCRIPTION ENCOUNTER (OUTPATIENT)
Age: 73
End: 2023-12-11

## 2023-12-19 ENCOUNTER — NON-APPOINTMENT (OUTPATIENT)
Age: 73
End: 2023-12-19

## 2023-12-19 ENCOUNTER — APPOINTMENT (OUTPATIENT)
Dept: OPHTHALMOLOGY | Facility: CLINIC | Age: 73
End: 2023-12-19
Payer: MEDICARE

## 2023-12-19 PROCEDURE — 92134 CPTRZ OPH DX IMG PST SGM RTA: CPT

## 2023-12-19 PROCEDURE — 99024 POSTOP FOLLOW-UP VISIT: CPT

## 2023-12-24 ENCOUNTER — RX RENEWAL (OUTPATIENT)
Age: 73
End: 2023-12-24

## 2023-12-27 ENCOUNTER — APPOINTMENT (OUTPATIENT)
Dept: NEUROLOGY | Facility: CLINIC | Age: 73
End: 2023-12-27

## 2024-01-16 ENCOUNTER — TRANSCRIPTION ENCOUNTER (OUTPATIENT)
Age: 74
End: 2024-01-16

## 2024-02-25 ENCOUNTER — RX RENEWAL (OUTPATIENT)
Age: 74
End: 2024-02-25

## 2024-03-21 ENCOUNTER — APPOINTMENT (OUTPATIENT)
Dept: HEART AND VASCULAR | Facility: CLINIC | Age: 74
End: 2024-03-21
Payer: MEDICARE

## 2024-03-21 ENCOUNTER — NON-APPOINTMENT (OUTPATIENT)
Age: 74
End: 2024-03-21

## 2024-03-21 VITALS
HEIGHT: 66 IN | WEIGHT: 183 LBS | OXYGEN SATURATION: 97 % | BODY MASS INDEX: 29.41 KG/M2 | HEART RATE: 89 BPM | SYSTOLIC BLOOD PRESSURE: 130 MMHG | DIASTOLIC BLOOD PRESSURE: 70 MMHG

## 2024-03-21 PROCEDURE — 93000 ELECTROCARDIOGRAM COMPLETE: CPT

## 2024-03-21 PROCEDURE — 36415 COLL VENOUS BLD VENIPUNCTURE: CPT

## 2024-03-21 PROCEDURE — 99214 OFFICE O/P EST MOD 30 MIN: CPT | Mod: 25

## 2024-03-21 RX ORDER — SERTRALINE HYDROCHLORIDE 50 MG/1
50 TABLET, FILM COATED ORAL DAILY
Refills: 0 | Status: ACTIVE | COMMUNITY
Start: 2024-03-21

## 2024-03-21 RX ORDER — BUSPIRONE HYDROCHLORIDE 5 MG/1
5 TABLET ORAL
Qty: 180 | Refills: 0 | Status: DISCONTINUED | COMMUNITY
Start: 2023-06-14 | End: 2024-03-21

## 2024-03-21 RX ORDER — FLUOXETINE HYDROCHLORIDE 20 MG/1
20 CAPSULE ORAL
Qty: 270 | Refills: 3 | Status: DISCONTINUED | COMMUNITY
Start: 2022-02-13 | End: 2024-03-21

## 2024-03-22 ENCOUNTER — TRANSCRIPTION ENCOUNTER (OUTPATIENT)
Age: 74
End: 2024-03-22

## 2024-03-22 LAB
ALBUMIN SERPL ELPH-MCNC: 4.1 G/DL
ALP BLD-CCNC: 90 U/L
ALT SERPL-CCNC: 17 U/L
ANION GAP SERPL CALC-SCNC: 14 MMOL/L
AST SERPL-CCNC: 16 U/L
BILIRUB SERPL-MCNC: 1 MG/DL
BUN SERPL-MCNC: 16 MG/DL
CALCIUM SERPL-MCNC: 9.7 MG/DL
CHLORIDE SERPL-SCNC: 102 MMOL/L
CHOLEST SERPL-MCNC: 132 MG/DL
CO2 SERPL-SCNC: 24 MMOL/L
CREAT SERPL-MCNC: 0.82 MG/DL
EGFR: 75 ML/MIN/1.73M2
ESTIMATED AVERAGE GLUCOSE: 114 MG/DL
GLUCOSE SERPL-MCNC: 103 MG/DL
HBA1C MFR BLD HPLC: 5.6 %
HDLC SERPL-MCNC: 41 MG/DL
LDLC SERPL CALC-MCNC: 71 MG/DL
NONHDLC SERPL-MCNC: 91 MG/DL
NT-PROBNP SERPL-MCNC: 712 PG/ML
POTASSIUM SERPL-SCNC: 4.2 MMOL/L
PROT SERPL-MCNC: 6.9 G/DL
SODIUM SERPL-SCNC: 140 MMOL/L
TRIGL SERPL-MCNC: 109 MG/DL

## 2024-03-22 NOTE — PHYSICAL EXAM
[Well Developed] : well developed [Well Nourished] : well nourished [No Acute Distress] : no acute distress [Normal Venous Pressure] : normal venous pressure [No Carotid Bruit] : no carotid bruit [Normal S1, S2] : normal S1, S2 [No Murmur] : no murmur [No Rub] : no rub [No Gallop] : no gallop [Clear Lung Fields] : clear lung fields [Good Air Entry] : good air entry [No Respiratory Distress] : no respiratory distress  [Soft] : abdomen soft [Non Tender] : non-tender [Normal Bowel Sounds] : normal bowel sounds [No Masses/organomegaly] : no masses/organomegaly [No Cyanosis] : no cyanosis [Normal Gait] : normal gait [No Edema] : no edema [No Clubbing] : no clubbing [No Varicosities] : no varicosities [No Skin Lesions] : no skin lesions [No Rash] : no rash [Moves all extremities] : moves all extremities [No Focal Deficits] : no focal deficits [Alert and Oriented] : alert and oriented [Normal Speech] : normal speech [Normal memory] : normal memory [General Appearance - Well Developed] : well developed [Normal Appearance] : normal appearance [Well Groomed] : well groomed [General Appearance - Well Nourished] : well nourished [No Deformities] : no deformities [Normal Conjunctiva] : the conjunctiva exhibited no abnormalities [General Appearance - In No Acute Distress] : no acute distress [Eyelids - No Xanthelasma] : the eyelids demonstrated no xanthelasmas [No Oral Pallor] : no oral pallor [No Oral Cyanosis] : no oral cyanosis [Normal Oral Mucosa] : normal oral mucosa [Normal Jugular Venous A Waves Present] : normal jugular venous A waves present [Normal Jugular Venous V Waves Present] : normal jugular venous V waves present [No Jugular Venous Burks A Waves] : no jugular venous burks A waves [Respiration, Rhythm And Depth] : normal respiratory rhythm and effort [Exaggerated Use Of Accessory Muscles For Inspiration] : no accessory muscle use [Heart Rate And Rhythm] : heart rate and rhythm were normal [Auscultation Breath Sounds / Voice Sounds] : lungs were clear to auscultation bilaterally [Murmurs] : no murmurs present [Heart Sounds] : normal S1 and S2 [Abdomen Soft] : soft [Abdomen Tenderness] : non-tender [Abdomen Mass (___ Cm)] : no abdominal mass palpated [Abnormal Walk] : normal gait [Gait - Sufficient For Exercise Testing] : the gait was sufficient for exercise testing [Nail Clubbing] : no clubbing of the fingernails [Cyanosis, Localized] : no localized cyanosis [Petechial Hemorrhages (___cm)] : no petechial hemorrhages [Skin Color & Pigmentation] : normal skin color and pigmentation [] : no rash [No Venous Stasis] : no venous stasis [Skin Lesions] : no skin lesions [No Xanthoma] : no  xanthoma was observed [No Skin Ulcers] : no skin ulcer [Oriented To Time, Place, And Person] : oriented to person, place, and time [Affect] : the affect was normal [Mood] : the mood was normal [No Anxiety] : not feeling anxious [FreeTextEntry1] : systolic murmur

## 2024-03-22 NOTE — HISTORY OF PRESENT ILLNESS
[FreeTextEntry1] : 73 F ex Smoker with TIA HTN carotid stenosis ccta 2016 normal coronaries echo asymmetric septal hypertrophy with dynamic outflow tract obstruction peak gradient 47 resting 14 mmHg. negative genetic testing negative CMRI ILD ARNOL Interstitial lung abnormality former smoker   here for fu lost her son very upset she has stable shortness of breath tolerating meds well      ecg nsr lvh nsst changes 3/21/24 3/2021 mild LVH normal EF

## 2024-03-22 NOTE — ASSESSMENT
[FreeTextEntry1] : LVOT obstruction on metoprolol 50 mg daily  htn no diuretics may have created LVOT obstruction in the past  carotid stenosis on atorvastatin 80 mg daily per neuro now on aspirin no plavix  smoking cessation she has stopped smoking she is vaping asymetric septal hypertrophy no outflow gradient repeat MRI for fibrosis last was 2018 fu in 6 months

## 2024-04-02 ENCOUNTER — APPOINTMENT (OUTPATIENT)
Dept: OPHTHALMOLOGY | Facility: CLINIC | Age: 74
End: 2024-04-02
Payer: MEDICARE

## 2024-04-02 ENCOUNTER — NON-APPOINTMENT (OUTPATIENT)
Age: 74
End: 2024-04-02

## 2024-04-02 PROCEDURE — 92012 INTRM OPH EXAM EST PATIENT: CPT

## 2024-04-11 ENCOUNTER — RX RENEWAL (OUTPATIENT)
Age: 74
End: 2024-04-11

## 2024-04-11 RX ORDER — METOPROLOL SUCCINATE 50 MG/1
50 TABLET, EXTENDED RELEASE ORAL DAILY
Qty: 90 | Refills: 3 | Status: ACTIVE | COMMUNITY
Start: 2023-06-07 | End: 1900-01-01

## 2024-04-12 ENCOUNTER — RX RENEWAL (OUTPATIENT)
Age: 74
End: 2024-04-12

## 2024-04-12 RX ORDER — TELMISARTAN 80 MG/1
80 TABLET ORAL
Qty: 90 | Refills: 3 | Status: ACTIVE | COMMUNITY
Start: 2020-01-21 | End: 1900-01-01

## 2024-04-12 RX ORDER — ATORVASTATIN CALCIUM 80 MG/1
80 TABLET, FILM COATED ORAL
Qty: 90 | Refills: 3 | Status: ACTIVE | COMMUNITY
Start: 2022-01-24 | End: 1900-01-01

## 2024-04-27 ENCOUNTER — RX RENEWAL (OUTPATIENT)
Age: 74
End: 2024-04-27

## 2024-05-08 ENCOUNTER — APPOINTMENT (OUTPATIENT)
Dept: OPHTHALMOLOGY | Facility: CLINIC | Age: 74
End: 2024-05-08
Payer: MEDICARE

## 2024-05-08 ENCOUNTER — NON-APPOINTMENT (OUTPATIENT)
Age: 74
End: 2024-05-08

## 2024-05-08 PROCEDURE — 92014 COMPRE OPH EXAM EST PT 1/>: CPT

## 2024-05-08 PROCEDURE — 92134 CPTRZ OPH DX IMG PST SGM RTA: CPT

## 2024-05-16 ENCOUNTER — NON-APPOINTMENT (OUTPATIENT)
Age: 74
End: 2024-05-16

## 2024-05-16 ENCOUNTER — LABORATORY RESULT (OUTPATIENT)
Age: 74
End: 2024-05-16

## 2024-05-16 ENCOUNTER — APPOINTMENT (OUTPATIENT)
Dept: NEUROLOGY | Facility: CLINIC | Age: 74
End: 2024-05-16
Payer: MEDICARE

## 2024-05-16 VITALS
BODY MASS INDEX: 30.05 KG/M2 | HEIGHT: 66 IN | HEART RATE: 57 BPM | TEMPERATURE: 97 F | DIASTOLIC BLOOD PRESSURE: 69 MMHG | SYSTOLIC BLOOD PRESSURE: 154 MMHG | WEIGHT: 187 LBS | OXYGEN SATURATION: 95 %

## 2024-05-16 PROCEDURE — 99205 OFFICE O/P NEW HI 60 MIN: CPT

## 2024-05-16 PROCEDURE — 99215 OFFICE O/P EST HI 40 MIN: CPT

## 2024-05-16 PROCEDURE — G2211 COMPLEX E/M VISIT ADD ON: CPT

## 2024-05-16 RX ORDER — LORAZEPAM 0.5 MG/1
0.5 TABLET ORAL
Qty: 10 | Refills: 0 | Status: DISCONTINUED | COMMUNITY
Start: 2024-01-16 | End: 2024-05-16

## 2024-05-16 RX ORDER — LORAZEPAM 0.5 MG/1
0.5 TABLET ORAL ONCE
Qty: 2 | Refills: 0 | Status: ACTIVE | COMMUNITY
Start: 2024-05-16 | End: 1900-01-01

## 2024-05-16 RX ORDER — ASPIRIN ENTERIC COATED TABLETS 81 MG 81 MG/1
81 TABLET, DELAYED RELEASE ORAL DAILY
Refills: 0 | Status: ACTIVE | COMMUNITY
Start: 2022-12-10

## 2024-05-22 ENCOUNTER — TRANSCRIPTION ENCOUNTER (OUTPATIENT)
Age: 74
End: 2024-05-22

## 2024-05-22 LAB
ALBUMIN MFR SERPL ELPH: 57.7 %
ALBUMIN SERPL-MCNC: 3.9 G/DL
ALBUMIN/GLOB SERPL: 1.4 RATIO
ALPHA1 GLOB MFR SERPL ELPH: 4.6 %
ALPHA1 GLOB SERPL ELPH-MCNC: 0.3 G/DL
ALPHA2 GLOB MFR SERPL ELPH: 13.8 %
ALPHA2 GLOB SERPL ELPH-MCNC: 0.9 G/DL
ANION GAP SERPL CALC-SCNC: 11 MMOL/L
B-GLOBULIN MFR SERPL ELPH: 13 %
B-GLOBULIN SERPL ELPH-MCNC: 0.9 G/DL
BUN SERPL-MCNC: 16 MG/DL
CALCIUM SERPL-MCNC: 9.7 MG/DL
CHLORIDE SERPL-SCNC: 101 MMOL/L
CO2 SERPL-SCNC: 28 MMOL/L
CREAT SERPL-MCNC: 0.76 MG/DL
EGFR: 82 ML/MIN/1.73M2
ERYTHROCYTE [SEDIMENTATION RATE] IN BLOOD BY WESTERGREN METHOD: 15 MM/HR
GAMMA GLOB FLD ELPH-MCNC: 0.7 G/DL
GAMMA GLOB MFR SERPL ELPH: 10.9 %
GLUCOSE SERPL-MCNC: 98 MG/DL
HCT VFR BLD CALC: 44.7 %
HGB BLD-MCNC: 14.6 G/DL
INTERPRETATION SERPL IEP-IMP: NORMAL
MCHC RBC-ENTMCNC: 28.5 PG
MCHC RBC-ENTMCNC: 32.7 GM/DL
MCV RBC AUTO: 87.3 FL
PLATELET # BLD AUTO: 232 K/UL
POTASSIUM SERPL-SCNC: 4.4 MMOL/L
PROT SERPL-MCNC: 6.7 G/DL
PROT SERPL-MCNC: 6.7 G/DL
RBC # BLD: 5.12 M/UL
RBC # FLD: 15.2 %
SODIUM SERPL-SCNC: 140 MMOL/L
TSH SERPL-ACNC: 3.21 UIU/ML
VIT B12 SERPL-MCNC: 515 PG/ML
WBC # FLD AUTO: 9.08 K/UL

## 2024-05-24 ENCOUNTER — APPOINTMENT (OUTPATIENT)
Dept: INTERNAL MEDICINE | Facility: CLINIC | Age: 74
End: 2024-05-24
Payer: MEDICARE

## 2024-05-24 VITALS
HEART RATE: 64 BPM | TEMPERATURE: 97.2 F | WEIGHT: 186 LBS | SYSTOLIC BLOOD PRESSURE: 158 MMHG | DIASTOLIC BLOOD PRESSURE: 80 MMHG | HEIGHT: 66 IN | OXYGEN SATURATION: 98 % | BODY MASS INDEX: 29.89 KG/M2

## 2024-05-24 DIAGNOSIS — I42.2 OTHER HYPERTROPHIC CARDIOMYOPATHY: ICD-10-CM

## 2024-05-24 DIAGNOSIS — R26.89 OTHER ABNORMALITIES OF GAIT AND MOBILITY: ICD-10-CM

## 2024-05-24 DIAGNOSIS — F41.9 ANXIETY DISORDER, UNSPECIFIED: ICD-10-CM

## 2024-05-24 DIAGNOSIS — I10 ESSENTIAL (PRIMARY) HYPERTENSION: ICD-10-CM

## 2024-05-24 DIAGNOSIS — J84.9 INTERSTITIAL PULMONARY DISEASE, UNSPECIFIED: ICD-10-CM

## 2024-05-24 PROCEDURE — 99214 OFFICE O/P EST MOD 30 MIN: CPT

## 2024-05-24 PROCEDURE — G2211 COMPLEX E/M VISIT ADD ON: CPT

## 2024-05-24 NOTE — PLAN
[FreeTextEntry1] :    ====== chart reviewed in detail since last visit ==========   [] f/u psychiatry for depression/grieving, cards, neuro for balance  [] recheck thyroid US in 12/2024 [] f/u neuro, cards, pulm  [] f/u derm for skin check  [] f/u pulm and cards, neuro  [] f/u gi for egd.

## 2024-05-24 NOTE — HISTORY OF PRESENT ILLNESS
[FreeTextEntry1] : anxiety/depression [de-identified] :  74 yo F w/ poorly controlled anxiety, h/o controlled TIA, improved left vertebral dissection,controlled HTN, stable pre-diabetes, stable left ICA stenosis, stable GERD, h/o stable duodenal ulcer, poorly controlled neck arthritis here for anxiety/depression  - been having more abdominal pain since son passed away and more body aches - still crying a lot

## 2024-05-28 ENCOUNTER — TRANSCRIPTION ENCOUNTER (OUTPATIENT)
Age: 74
End: 2024-05-28

## 2024-06-17 ENCOUNTER — APPOINTMENT (OUTPATIENT)
Dept: OPHTHALMOLOGY | Facility: CLINIC | Age: 74
End: 2024-06-17
Payer: MEDICARE

## 2024-06-17 ENCOUNTER — TRANSCRIPTION ENCOUNTER (OUTPATIENT)
Age: 74
End: 2024-06-17

## 2024-06-17 ENCOUNTER — NON-APPOINTMENT (OUTPATIENT)
Age: 74
End: 2024-06-17

## 2024-06-17 PROCEDURE — 67028 INJECTION EYE DRUG: CPT | Mod: LT

## 2024-06-17 PROCEDURE — 92012 INTRM OPH EXAM EST PATIENT: CPT | Mod: 25

## 2024-06-17 PROCEDURE — 92134 CPTRZ OPH DX IMG PST SGM RTA: CPT

## 2024-06-19 ENCOUNTER — APPOINTMENT (OUTPATIENT)
Dept: GASTROENTEROLOGY | Facility: CLINIC | Age: 74
End: 2024-06-19
Payer: MEDICARE

## 2024-06-19 ENCOUNTER — APPOINTMENT (OUTPATIENT)
Dept: CT IMAGING | Facility: HOSPITAL | Age: 74
End: 2024-06-19

## 2024-06-19 ENCOUNTER — OUTPATIENT (OUTPATIENT)
Dept: OUTPATIENT SERVICES | Facility: HOSPITAL | Age: 74
LOS: 1 days | End: 2024-06-19
Payer: MEDICARE

## 2024-06-19 VITALS
WEIGHT: 186 LBS | HEIGHT: 66 IN | HEART RATE: 61 BPM | DIASTOLIC BLOOD PRESSURE: 70 MMHG | BODY MASS INDEX: 29.89 KG/M2 | TEMPERATURE: 95.3 F | RESPIRATION RATE: 14 BRPM | OXYGEN SATURATION: 97 % | SYSTOLIC BLOOD PRESSURE: 132 MMHG

## 2024-06-19 DIAGNOSIS — K21.9 GASTRO-ESOPHAGEAL REFLUX DISEASE W/OUT ESOPHAGITIS: ICD-10-CM

## 2024-06-19 DIAGNOSIS — Z98.49 CATARACT EXTRACTION STATUS, UNSPECIFIED EYE: Chronic | ICD-10-CM

## 2024-06-19 PROCEDURE — 99204 OFFICE O/P NEW MOD 45 MIN: CPT

## 2024-06-19 PROCEDURE — 71250 CT THORAX DX C-: CPT

## 2024-06-19 PROCEDURE — 71250 CT THORAX DX C-: CPT | Mod: 26

## 2024-06-19 PROCEDURE — 99214 OFFICE O/P EST MOD 30 MIN: CPT

## 2024-06-19 RX ORDER — OMEPRAZOLE 20 MG/1
20 CAPSULE, DELAYED RELEASE ORAL DAILY
Qty: 30 | Refills: 5 | Status: ACTIVE | COMMUNITY
Start: 2024-06-19 | End: 1900-01-01

## 2024-06-19 RX ORDER — PANTOPRAZOLE 40 MG/1
40 TABLET, DELAYED RELEASE ORAL DAILY
Qty: 90 | Refills: 0 | Status: DISCONTINUED | COMMUNITY
Start: 2020-07-17 | End: 2024-06-19

## 2024-06-19 NOTE — ASSESSMENT
[FreeTextEntry1] : 73F with a h/o macular degeneration, HTN, anxiety, GERD, duodenal ulcer who presents for reflux symptoms.   #GERD - CBC, BMP, and TSH wnl  - years of reflux symptoms, exacerbated by son's death earlier this year - further worsened by her cigarette use  - advised switching from pantoprazole to omeprazole given suboptimal response (40 mg pantoprazole = 9 mg omeprazole)  - can use pepcid for breakthrough symptoms in the evenings  - no alarm/red flag symptoms, so can defer EGD at this time, but if no control at f/u appt, will schedule   RTC 4-6 weeks   Phi Duggan MD Gastroenterology

## 2024-06-19 NOTE — HISTORY OF PRESENT ILLNESS
[FreeTextEntry1] : 73F with a h/o macular degeneration, HTN, anxiety, GERD, duodenal ulcer who presents for reflux symptoms. She has had worsening of symptoms since her son passed away in January. Dr. Jacob rx'ed pantoprazole for her BID, which she states helps but only 20-25% improvement in symptoms. Rarely takes NSAIDs. Denies melena, dysphagia, and weight is stable. CBC from last month without anemia, BMP wnl.   Had an EGD with Dr. Blount a few years ago, which showed a duodenal ulcer. Had a colonoscopy 1-2 years ago, which was wnl.   Family Hx: father with colon cancer in his 70's  Social Hx: smokes approx 5 cigarettes daily since son passed away, prior 30 pack-years; no EtOH, no recreational drugs, retired  for [employer] [place]

## 2024-06-25 ENCOUNTER — TRANSCRIPTION ENCOUNTER (OUTPATIENT)
Age: 74
End: 2024-06-25

## 2024-06-26 ENCOUNTER — NON-APPOINTMENT (OUTPATIENT)
Age: 74
End: 2024-06-26

## 2024-06-26 ENCOUNTER — TRANSCRIPTION ENCOUNTER (OUTPATIENT)
Age: 74
End: 2024-06-26

## 2024-06-28 ENCOUNTER — NON-APPOINTMENT (OUTPATIENT)
Age: 74
End: 2024-06-28

## 2024-07-01 ENCOUNTER — RX RENEWAL (OUTPATIENT)
Age: 74
End: 2024-07-01

## 2024-07-02 ENCOUNTER — TRANSCRIPTION ENCOUNTER (OUTPATIENT)
Age: 74
End: 2024-07-02

## 2024-07-08 ENCOUNTER — TRANSCRIPTION ENCOUNTER (OUTPATIENT)
Age: 74
End: 2024-07-08

## 2024-07-08 ENCOUNTER — NON-APPOINTMENT (OUTPATIENT)
Age: 74
End: 2024-07-08

## 2024-07-09 ENCOUNTER — TRANSCRIPTION ENCOUNTER (OUTPATIENT)
Age: 74
End: 2024-07-09

## 2024-07-11 ENCOUNTER — NON-APPOINTMENT (OUTPATIENT)
Age: 74
End: 2024-07-11

## 2024-07-16 ENCOUNTER — APPOINTMENT (OUTPATIENT)
Dept: MRI IMAGING | Facility: HOSPITAL | Age: 74
End: 2024-07-16

## 2024-07-16 ENCOUNTER — OUTPATIENT (OUTPATIENT)
Dept: OUTPATIENT SERVICES | Facility: HOSPITAL | Age: 74
LOS: 1 days | End: 2024-07-16
Payer: MEDICARE

## 2024-07-16 DIAGNOSIS — Z98.49 CATARACT EXTRACTION STATUS, UNSPECIFIED EYE: Chronic | ICD-10-CM

## 2024-07-16 PROCEDURE — 75557 CARDIAC MRI FOR MORPH: CPT | Mod: 26

## 2024-07-16 PROCEDURE — 75557 CARDIAC MRI FOR MORPH: CPT

## 2024-07-17 ENCOUNTER — TRANSCRIPTION ENCOUNTER (OUTPATIENT)
Age: 74
End: 2024-07-17

## 2024-07-17 ENCOUNTER — APPOINTMENT (OUTPATIENT)
Dept: ORTHOPEDIC SURGERY | Facility: CLINIC | Age: 74
End: 2024-07-17

## 2024-07-18 ENCOUNTER — APPOINTMENT (OUTPATIENT)
Dept: PULMONOLOGY | Facility: CLINIC | Age: 74
End: 2024-07-18
Payer: MEDICARE

## 2024-07-18 VITALS
RESPIRATION RATE: 12 BRPM | SYSTOLIC BLOOD PRESSURE: 136 MMHG | HEIGHT: 66 IN | HEART RATE: 53 BPM | WEIGHT: 190 LBS | TEMPERATURE: 97 F | DIASTOLIC BLOOD PRESSURE: 70 MMHG | BODY MASS INDEX: 30.53 KG/M2 | OXYGEN SATURATION: 97 %

## 2024-07-18 DIAGNOSIS — J84.9 INTERSTITIAL PULMONARY DISEASE, UNSPECIFIED: ICD-10-CM

## 2024-07-18 DIAGNOSIS — R06.02 SHORTNESS OF BREATH: ICD-10-CM

## 2024-07-18 DIAGNOSIS — G47.33 OBSTRUCTIVE SLEEP APNEA (ADULT) (PEDIATRIC): ICD-10-CM

## 2024-07-18 PROCEDURE — 94618 PULMONARY STRESS TESTING: CPT

## 2024-07-18 PROCEDURE — 99214 OFFICE O/P EST MOD 30 MIN: CPT | Mod: 25

## 2024-07-18 PROCEDURE — 94010 BREATHING CAPACITY TEST: CPT

## 2024-07-18 PROCEDURE — 94729 DIFFUSING CAPACITY: CPT

## 2024-07-18 PROCEDURE — 94727 GAS DIL/WSHOT DETER LNG VOL: CPT

## 2024-07-18 PROCEDURE — ZZZZZ: CPT

## 2024-07-22 ENCOUNTER — NON-APPOINTMENT (OUTPATIENT)
Age: 74
End: 2024-07-22

## 2024-07-22 ENCOUNTER — APPOINTMENT (OUTPATIENT)
Dept: OPHTHALMOLOGY | Facility: CLINIC | Age: 74
End: 2024-07-22
Payer: MEDICARE

## 2024-07-22 PROCEDURE — 92012 INTRM OPH EXAM EST PATIENT: CPT | Mod: 25

## 2024-07-22 PROCEDURE — 67028 INJECTION EYE DRUG: CPT | Mod: LT

## 2024-07-22 PROCEDURE — 92134 CPTRZ OPH DX IMG PST SGM RTA: CPT

## 2024-07-25 ENCOUNTER — APPOINTMENT (OUTPATIENT)
Dept: GASTROENTEROLOGY | Facility: HOSPITAL | Age: 74
End: 2024-07-25

## 2024-07-29 ENCOUNTER — APPOINTMENT (OUTPATIENT)
Dept: NEUROLOGY | Facility: CLINIC | Age: 74
End: 2024-07-29
Payer: MEDICARE

## 2024-07-29 VITALS
TEMPERATURE: 97.2 F | OXYGEN SATURATION: 97 % | DIASTOLIC BLOOD PRESSURE: 72 MMHG | HEART RATE: 57 BPM | BODY MASS INDEX: 29.73 KG/M2 | WEIGHT: 185 LBS | SYSTOLIC BLOOD PRESSURE: 168 MMHG | HEIGHT: 66 IN

## 2024-07-29 DIAGNOSIS — G47.33 OBSTRUCTIVE SLEEP APNEA (ADULT) (PEDIATRIC): ICD-10-CM

## 2024-07-29 PROBLEM — I63.9 EMBOLIC STROKE: Status: ACTIVE | Noted: 2024-07-29

## 2024-07-29 PROCEDURE — 99205 OFFICE O/P NEW HI 60 MIN: CPT

## 2024-07-29 NOTE — PHYSICAL EXAM
[FreeTextEntry1] : Alert. Fully oriented. Speech and language are intact. Cranial nerves II-XII are intact. Motor exam reveals intact strength with individual muscle testing in bilateral upper and lower extremities. Tone is normal. Sensation is intact to light touch in distal extremities. Finger-to-nose is intact. Rapid alternating movements are normal in the upper and lower extremities. Gait is normal.

## 2024-07-29 NOTE — HISTORY OF PRESENT ILLNESS
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   She reports no focal neurological symptoms within the past few months. She is currently taking Plavix 75 mg and Atorvastatin 80 mg without bleeding, bruising or myalgias. BP today 168/72. She does not take her BP at home. She has not been exercising regularly since her son's death in January but has recently gotten back in chair yoga and is currently working toward a goal of 10,000 steps per day, currently at 5,000 steps. She has ARNOL but has not been using her CPAP since COVID. She reports a family history of a brain aneurysm in her sister. She has been intermittently smoking since January but recently stopped about 4 days ago. She reports smoking for the past 60 years. She does not drink.   MRI with multiple chronic lacunar infarcts of bilateral cerebellum (left greater than right), basis pontis and right centrum semiovale, suspicious for embolic infarcts. Probably mild chronic microvascular ischemia of the cerebral white matter. T2 hyperintense signal in the symmetrically smaller left V4 segment related to stenosis with turbulent flow or partial thrombosis.  Recent lab work from March with LDL 71 and A1c 5.6% Cardiac MRI with EF 87%. No significant valvular abnormalities.

## 2024-07-30 ENCOUNTER — TRANSCRIPTION ENCOUNTER (OUTPATIENT)
Age: 74
End: 2024-07-30

## 2024-07-30 ENCOUNTER — APPOINTMENT (OUTPATIENT)
Dept: DERMATOLOGY | Facility: CLINIC | Age: 74
End: 2024-07-30
Payer: MEDICARE

## 2024-07-30 VITALS — WEIGHT: 187 LBS | HEIGHT: 66 IN | BODY MASS INDEX: 30.05 KG/M2

## 2024-07-30 DIAGNOSIS — D48.9 NEOPLASM OF UNCERTAIN BEHAVIOR, UNSPECIFIED: ICD-10-CM

## 2024-07-30 DIAGNOSIS — L82.0 INFLAMED SEBORRHEIC KERATOSIS: ICD-10-CM

## 2024-07-30 DIAGNOSIS — D36.10 BENIGN NEOPLASM OF PERIPHERAL NERVES AND AUTONOMIC NERVOUS SYSTEM, UNSPECIFIED: ICD-10-CM

## 2024-07-30 DIAGNOSIS — Z85.828 PERSONAL HISTORY OF OTHER MALIGNANT NEOPLASM OF SKIN: ICD-10-CM

## 2024-07-30 DIAGNOSIS — Z80.8 FAMILY HISTORY OF MALIGNANT NEOPLASM OF OTHER ORGANS OR SYSTEMS: ICD-10-CM

## 2024-07-30 DIAGNOSIS — L57.0 ACTINIC KERATOSIS: ICD-10-CM

## 2024-07-30 PROCEDURE — 99203 OFFICE O/P NEW LOW 30 MIN: CPT | Mod: 25

## 2024-07-30 PROCEDURE — 11104 PUNCH BX SKIN SINGLE LESION: CPT

## 2024-07-30 PROCEDURE — 17110 DESTRUCTION B9 LES UP TO 14: CPT | Mod: 59

## 2024-07-30 PROCEDURE — 17000 DESTRUCT PREMALG LESION: CPT | Mod: 59

## 2024-07-30 NOTE — PHYSICAL EXAM
[FreeTextEntry3] : On the L temple is an irritated stuck-on appearing papule/plaque with pseudohorn cysts.  L nasal sidewall: pink scaly non indurated macule  R ant wrist: pink domed soft papule   R lat thigh: firm pink smooth papule

## 2024-07-30 NOTE — HISTORY OF PRESENT ILLNESS
[FreeTextEntry1] : NPV- Growths on Body  [de-identified] : Rashmi Gregg is a 72 y/o F presenting for growths.  -L temple: brown bump x years. Getting bigger and inflamed.  - R ant wrist: bump x years. gets caught on things - Bump on right lateral knee. Noticed few months ago. slightly tender w/ palpation  PMH -BCC on toe s/p Mohs  FHx  -No family history of melanoma

## 2024-07-30 NOTE — ASSESSMENT
[FreeTextEntry1] : #Seborrheic Keratoses, clinically inflamed Natural history and treatment options discussed. Recommend treatment with cryotherapy to which the patient is agreeable.   Cryotherapy Procedure Note. Lesion(s) / Location treated: L temple After obtaining verbal consent, including counseling on risks of dyspigmentation and scarring, liquid nitrogen was applied to the above lesions. The patient tolerated the procedure well. Wound care was reviewed.  # #Actinic keratosis - 1, L nasal sidewall  Provided anticipatory guidance and education regarding these lesions.  Given there is a risk of malignancy without treatment, I would recommend treatment with cryotherapy.   Cryotherapy Procedure Note.   After obtaining verbal consent, including counseling on risks of dyspigmentation and scarring, liquid nitrogen was applied to the above lesions. The patient tolerated the procedure well.  Wound care was reviewed.  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 She elected to proceed -refer to procedure clinic for surgical excision  #  Smooth papule- R lateral knee ddx: DF, keloid, leiomyoma, DFSP Biopsy by 4mm Punch Technique Procedure Note.   Location: R lateral knee .  After discussion of risks, verbal consent was obtained and a time out was performed.  The area was cleaned with an alcohol swab.  Anesthesia: 1% lidocaine with 1:100,000 epinephrine.  Closure with 4-0 Chromic interrupted suture.  Specimen was sent for pathologic examination.  Wound care was reviewed with the patient.  Will contact patient with biopsy results.  #History of NMSC  RTC yearly for FBSE or prn

## 2024-08-02 ENCOUNTER — APPOINTMENT (OUTPATIENT)
Dept: GASTROENTEROLOGY | Facility: CLINIC | Age: 74
End: 2024-08-02
Payer: MEDICARE

## 2024-08-02 PROCEDURE — 99442: CPT

## 2024-08-05 ENCOUNTER — APPOINTMENT (OUTPATIENT)
Dept: MRI IMAGING | Facility: HOSPITAL | Age: 74
End: 2024-08-05

## 2024-08-05 ENCOUNTER — OUTPATIENT (OUTPATIENT)
Dept: OUTPATIENT SERVICES | Facility: HOSPITAL | Age: 74
LOS: 1 days | End: 2024-08-05
Payer: MEDICARE

## 2024-08-05 DIAGNOSIS — Z98.49 CATARACT EXTRACTION STATUS, UNSPECIFIED EYE: Chronic | ICD-10-CM

## 2024-08-05 PROCEDURE — 70544 MR ANGIOGRAPHY HEAD W/O DYE: CPT | Mod: 26

## 2024-08-05 PROCEDURE — 70547 MR ANGIOGRAPHY NECK W/O DYE: CPT | Mod: 26

## 2024-08-05 PROCEDURE — 70544 MR ANGIOGRAPHY HEAD W/O DYE: CPT

## 2024-08-05 PROCEDURE — 70547 MR ANGIOGRAPHY NECK W/O DYE: CPT

## 2024-08-06 LAB — DERMATOLOGY BIOPSY: NORMAL

## 2024-08-13 DIAGNOSIS — I63.233 CEREBRAL INFARCTION DUE TO UNSPECIFIED OCCLUSION OR STENOSIS OF BILATERAL CAROTID ARTERIES: ICD-10-CM

## 2024-08-13 DIAGNOSIS — I67.9 CEREBROVASCULAR DISEASE, UNSPECIFIED: ICD-10-CM

## 2024-08-13 DIAGNOSIS — I67.2 CEREBRAL ATHEROSCLEROSIS: ICD-10-CM

## 2024-08-13 DIAGNOSIS — I65.23 OCCLUSION AND STENOSIS OF BILATERAL CAROTID ARTERIES: ICD-10-CM

## 2024-08-13 DIAGNOSIS — I63.511 CEREBRAL INFARCTION DUE TO UNSPECIFIED OCCLUSION OR STENOSIS OF RIGHT MIDDLE CEREBRAL ARTERY: ICD-10-CM

## 2024-08-13 DIAGNOSIS — R90.82 WHITE MATTER DISEASE, UNSPECIFIED: ICD-10-CM

## 2024-08-19 ENCOUNTER — TRANSCRIPTION ENCOUNTER (OUTPATIENT)
Age: 74
End: 2024-08-19

## 2024-08-21 ENCOUNTER — TRANSCRIPTION ENCOUNTER (OUTPATIENT)
Age: 74
End: 2024-08-21

## 2024-08-26 ENCOUNTER — APPOINTMENT (OUTPATIENT)
Dept: OPHTHALMOLOGY | Facility: CLINIC | Age: 74
End: 2024-08-26
Payer: MEDICARE

## 2024-08-26 ENCOUNTER — NON-APPOINTMENT (OUTPATIENT)
Age: 74
End: 2024-08-26

## 2024-08-26 PROCEDURE — 92012 INTRM OPH EXAM EST PATIENT: CPT | Mod: 25

## 2024-08-26 PROCEDURE — 92134 CPTRZ OPH DX IMG PST SGM RTA: CPT

## 2024-08-26 PROCEDURE — 67028 INJECTION EYE DRUG: CPT | Mod: LT

## 2024-09-03 ENCOUNTER — APPOINTMENT (OUTPATIENT)
Dept: NEUROLOGY | Facility: CLINIC | Age: 74
End: 2024-09-03

## 2024-09-26 ENCOUNTER — NON-APPOINTMENT (OUTPATIENT)
Age: 74
End: 2024-09-26

## 2024-09-27 ENCOUNTER — TRANSCRIPTION ENCOUNTER (OUTPATIENT)
Age: 74
End: 2024-09-27

## 2024-09-27 ENCOUNTER — NON-APPOINTMENT (OUTPATIENT)
Age: 74
End: 2024-09-27

## 2024-09-29 ENCOUNTER — NON-APPOINTMENT (OUTPATIENT)
Age: 74
End: 2024-09-29

## 2024-09-30 ENCOUNTER — TRANSCRIPTION ENCOUNTER (OUTPATIENT)
Age: 74
End: 2024-09-30

## 2024-10-07 ENCOUNTER — TRANSCRIPTION ENCOUNTER (OUTPATIENT)
Age: 74
End: 2024-10-07

## 2024-10-08 ENCOUNTER — TRANSCRIPTION ENCOUNTER (OUTPATIENT)
Age: 74
End: 2024-10-08

## 2024-10-09 ENCOUNTER — APPOINTMENT (OUTPATIENT)
Dept: OPHTHALMOLOGY | Facility: CLINIC | Age: 74
End: 2024-10-09
Payer: MEDICARE

## 2024-10-09 ENCOUNTER — NON-APPOINTMENT (OUTPATIENT)
Age: 74
End: 2024-10-09

## 2024-10-09 PROCEDURE — 92134 CPTRZ OPH DX IMG PST SGM RTA: CPT

## 2024-10-09 PROCEDURE — 67028 INJECTION EYE DRUG: CPT | Mod: LT

## 2024-10-09 PROCEDURE — 92012 INTRM OPH EXAM EST PATIENT: CPT | Mod: 25

## 2024-10-16 ENCOUNTER — APPOINTMENT (OUTPATIENT)
Dept: OPHTHALMOLOGY | Facility: CLINIC | Age: 74
End: 2024-10-16

## 2024-10-31 ENCOUNTER — APPOINTMENT (OUTPATIENT)
Dept: NEUROLOGY | Facility: CLINIC | Age: 74
End: 2024-10-31

## 2024-11-01 ENCOUNTER — NON-APPOINTMENT (OUTPATIENT)
Age: 74
End: 2024-11-01

## 2024-11-04 ENCOUNTER — NON-APPOINTMENT (OUTPATIENT)
Age: 74
End: 2024-11-04

## 2024-11-04 LAB
ALBUMIN SERPL ELPH-MCNC: 4.3 G/DL
ALP BLD-CCNC: 90 U/L
ALT SERPL-CCNC: 18 U/L
ANION GAP SERPL CALC-SCNC: 13 MMOL/L
APTT BLD: 35.1 SEC
AST SERPL-CCNC: 19 U/L
BASOPHILS # BLD AUTO: 0.04 K/UL
BASOPHILS NFR BLD AUTO: 0.5 %
BILIRUB SERPL-MCNC: 0.9 MG/DL
BUN SERPL-MCNC: 13 MG/DL
CALCIUM SERPL-MCNC: 9.7 MG/DL
CHLORIDE SERPL-SCNC: 100 MMOL/L
CO2 SERPL-SCNC: 27 MMOL/L
CREAT SERPL-MCNC: 0.68 MG/DL
EGFR: 91 ML/MIN/1.73M2
EOSINOPHIL # BLD AUTO: 0.16 K/UL
EOSINOPHIL NFR BLD AUTO: 2 %
GLUCOSE SERPL-MCNC: 114 MG/DL
HCT VFR BLD CALC: 43.2 %
HGB BLD-MCNC: 14.3 G/DL
IMM GRANULOCYTES NFR BLD AUTO: 0.4 %
INR PPP: 0.99 RATIO
LYMPHOCYTES # BLD AUTO: 2.22 K/UL
LYMPHOCYTES NFR BLD AUTO: 28.2 %
MAN DIFF?: NORMAL
MCHC RBC-ENTMCNC: 28.8 PG
MCHC RBC-ENTMCNC: 33.1 G/DL
MCV RBC AUTO: 86.9 FL
MONOCYTES # BLD AUTO: 0.55 K/UL
MONOCYTES NFR BLD AUTO: 7 %
NEUTROPHILS # BLD AUTO: 4.87 K/UL
NEUTROPHILS NFR BLD AUTO: 61.9 %
PLATELET # BLD AUTO: 273 K/UL
POTASSIUM SERPL-SCNC: 4.4 MMOL/L
PROT SERPL-MCNC: 6.7 G/DL
PT BLD: 11.7 SEC
RBC # BLD: 4.97 M/UL
RBC # FLD: 15.1 %
SODIUM SERPL-SCNC: 140 MMOL/L
WBC # FLD AUTO: 7.87 K/UL

## 2024-11-05 ENCOUNTER — NON-APPOINTMENT (OUTPATIENT)
Age: 74
End: 2024-11-05

## 2024-11-05 ENCOUNTER — APPOINTMENT (OUTPATIENT)
Dept: HEART AND VASCULAR | Facility: CLINIC | Age: 74
End: 2024-11-05
Payer: MEDICARE

## 2024-11-05 ENCOUNTER — RX RENEWAL (OUTPATIENT)
Age: 74
End: 2024-11-05

## 2024-11-05 VITALS — DIASTOLIC BLOOD PRESSURE: 61 MMHG | SYSTOLIC BLOOD PRESSURE: 129 MMHG

## 2024-11-05 VITALS — TEMPERATURE: 97 F | HEART RATE: 58 BPM

## 2024-11-05 VITALS — HEIGHT: 66 IN | WEIGHT: 180 LBS | BODY MASS INDEX: 28.93 KG/M2

## 2024-11-05 PROCEDURE — 99214 OFFICE O/P EST MOD 30 MIN: CPT | Mod: 25

## 2024-11-05 PROCEDURE — 93000 ELECTROCARDIOGRAM COMPLETE: CPT

## 2024-11-08 ENCOUNTER — APPOINTMENT (OUTPATIENT)
Dept: HEART AND VASCULAR | Facility: CLINIC | Age: 74
End: 2024-11-08
Payer: MEDICARE

## 2024-11-08 ENCOUNTER — NON-APPOINTMENT (OUTPATIENT)
Age: 74
End: 2024-11-08

## 2024-11-08 VITALS
WEIGHT: 181 LBS | TEMPERATURE: 97.6 F | BODY MASS INDEX: 29.09 KG/M2 | DIASTOLIC BLOOD PRESSURE: 70 MMHG | OXYGEN SATURATION: 96 % | SYSTOLIC BLOOD PRESSURE: 140 MMHG | HEART RATE: 58 BPM | HEIGHT: 66 IN

## 2024-11-08 DIAGNOSIS — R01.1 CARDIAC MURMUR, UNSPECIFIED: ICD-10-CM

## 2024-11-08 PROCEDURE — G2211 COMPLEX E/M VISIT ADD ON: CPT

## 2024-11-08 PROCEDURE — 99214 OFFICE O/P EST MOD 30 MIN: CPT

## 2024-11-08 PROCEDURE — 93000 ELECTROCARDIOGRAM COMPLETE: CPT

## 2024-11-11 ENCOUNTER — TRANSCRIPTION ENCOUNTER (OUTPATIENT)
Age: 74
End: 2024-11-11

## 2024-11-15 ENCOUNTER — TRANSCRIPTION ENCOUNTER (OUTPATIENT)
Age: 74
End: 2024-11-15

## 2024-11-15 ENCOUNTER — RESULT REVIEW (OUTPATIENT)
Age: 74
End: 2024-11-15

## 2024-11-15 ENCOUNTER — APPOINTMENT (OUTPATIENT)
Dept: PULMONOLOGY | Facility: HOSPITAL | Age: 74
End: 2024-11-15

## 2024-11-15 ENCOUNTER — OUTPATIENT (OUTPATIENT)
Dept: OUTPATIENT SERVICES | Facility: HOSPITAL | Age: 74
LOS: 1 days | Discharge: HOME | End: 2024-11-15
Payer: MEDICARE

## 2024-11-15 VITALS — WEIGHT: 190.04 LBS | HEIGHT: 66 IN

## 2024-11-15 DIAGNOSIS — Z98.49 CATARACT EXTRACTION STATUS, UNSPECIFIED EYE: Chronic | ICD-10-CM

## 2024-11-15 PROCEDURE — 31624 DX BRONCHOSCOPE/LAVAGE: CPT | Mod: GC

## 2024-11-15 PROCEDURE — 31628 BRONCHOSCOPY/LUNG BX EACH: CPT | Mod: GC

## 2024-11-15 PROCEDURE — 88112 CYTOPATH CELL ENHANCE TECH: CPT | Mod: 26

## 2024-11-15 PROCEDURE — 88305 TISSUE EXAM BY PATHOLOGIST: CPT | Mod: 26

## 2024-11-15 PROCEDURE — 71045 X-RAY EXAM CHEST 1 VIEW: CPT | Mod: 26

## 2024-11-16 PROCEDURE — 87116 MYCOBACTERIA CULTURE: CPT

## 2024-11-16 PROCEDURE — 89051 BODY FLUID CELL COUNT: CPT

## 2024-11-16 PROCEDURE — 71045 X-RAY EXAM CHEST 1 VIEW: CPT

## 2024-11-16 PROCEDURE — 87015 SPECIMEN INFECT AGNT CONCNTJ: CPT

## 2024-11-16 PROCEDURE — 87205 SMEAR GRAM STAIN: CPT

## 2024-11-16 PROCEDURE — 31624 DX BRONCHOSCOPE/LAVAGE: CPT

## 2024-11-16 PROCEDURE — 88305 TISSUE EXAM BY PATHOLOGIST: CPT

## 2024-11-16 PROCEDURE — C9399: CPT

## 2024-11-16 PROCEDURE — 87070 CULTURE OTHR SPECIMN AEROBIC: CPT

## 2024-11-16 PROCEDURE — 87102 FUNGUS ISOLATION CULTURE: CPT

## 2024-11-16 PROCEDURE — 88185 FLOWCYTOMETRY/TC ADD-ON: CPT

## 2024-11-16 PROCEDURE — 87305 ASPERGILLUS AG IA: CPT

## 2024-11-16 PROCEDURE — 88184 FLOWCYTOMETRY/ TC 1 MARKER: CPT

## 2024-11-16 PROCEDURE — 88112 CYTOPATH CELL ENHANCE TECH: CPT

## 2024-11-16 PROCEDURE — 36415 COLL VENOUS BLD VENIPUNCTURE: CPT

## 2024-11-16 PROCEDURE — 31625 BRONCHOSCOPY W/BIOPSY(S): CPT

## 2024-11-16 PROCEDURE — 87206 SMEAR FLUORESCENT/ACID STAI: CPT

## 2024-11-16 PROCEDURE — 87449 NOS EACH ORGANISM AG IA: CPT

## 2024-11-16 PROCEDURE — 76000 FLUOROSCOPY <1 HR PHYS/QHP: CPT

## 2024-11-26 ENCOUNTER — RX RENEWAL (OUTPATIENT)
Age: 74
End: 2024-11-26

## 2024-11-26 PROCEDURE — 93246 EXT ECG>7D<15D RECORDING: CPT

## 2024-11-27 ENCOUNTER — TRANSCRIPTION ENCOUNTER (OUTPATIENT)
Age: 74
End: 2024-11-27

## 2024-11-29 ENCOUNTER — RX RENEWAL (OUTPATIENT)
Age: 74
End: 2024-11-29

## 2024-12-05 ENCOUNTER — NON-APPOINTMENT (OUTPATIENT)
Age: 74
End: 2024-12-05

## 2024-12-05 ENCOUNTER — APPOINTMENT (OUTPATIENT)
Dept: OPHTHALMOLOGY | Facility: CLINIC | Age: 74
End: 2024-12-05
Payer: MEDICARE

## 2024-12-05 ENCOUNTER — TRANSCRIPTION ENCOUNTER (OUTPATIENT)
Age: 74
End: 2024-12-05

## 2024-12-05 PROCEDURE — 92012 INTRM OPH EXAM EST PATIENT: CPT | Mod: 25

## 2024-12-05 PROCEDURE — 92134 CPTRZ OPH DX IMG PST SGM RTA: CPT

## 2024-12-05 PROCEDURE — 67028 INJECTION EYE DRUG: CPT | Mod: LT

## 2024-12-06 ENCOUNTER — APPOINTMENT (OUTPATIENT)
Dept: INTERNAL MEDICINE | Facility: CLINIC | Age: 74
End: 2024-12-06

## 2024-12-18 ENCOUNTER — NON-APPOINTMENT (OUTPATIENT)
Age: 74
End: 2024-12-18

## 2024-12-18 ENCOUNTER — TRANSCRIPTION ENCOUNTER (OUTPATIENT)
Age: 74
End: 2024-12-18

## 2024-12-19 ENCOUNTER — OUTPATIENT (OUTPATIENT)
Dept: OUTPATIENT SERVICES | Facility: HOSPITAL | Age: 74
LOS: 1 days | End: 2024-12-19
Payer: MEDICARE

## 2024-12-19 ENCOUNTER — APPOINTMENT (OUTPATIENT)
Dept: INTERNAL MEDICINE | Facility: CLINIC | Age: 74
End: 2024-12-19
Payer: MEDICARE

## 2024-12-19 VITALS
HEART RATE: 68 BPM | HEIGHT: 66 IN | TEMPERATURE: 96.6 F | SYSTOLIC BLOOD PRESSURE: 167 MMHG | WEIGHT: 180 LBS | OXYGEN SATURATION: 94 % | BODY MASS INDEX: 28.93 KG/M2 | DIASTOLIC BLOOD PRESSURE: 77 MMHG

## 2024-12-19 DIAGNOSIS — R51.9 HEADACHE, UNSPECIFIED: ICD-10-CM

## 2024-12-19 DIAGNOSIS — I10 ESSENTIAL (PRIMARY) HYPERTENSION: ICD-10-CM

## 2024-12-19 DIAGNOSIS — Z98.49 CATARACT EXTRACTION STATUS, UNSPECIFIED EYE: Chronic | ICD-10-CM

## 2024-12-19 DIAGNOSIS — J84.9 INTERSTITIAL PULMONARY DISEASE, UNSPECIFIED: ICD-10-CM

## 2024-12-19 PROCEDURE — 71046 X-RAY EXAM CHEST 2 VIEWS: CPT | Mod: 26

## 2024-12-19 PROCEDURE — 99215 OFFICE O/P EST HI 40 MIN: CPT

## 2024-12-19 PROCEDURE — 71046 X-RAY EXAM CHEST 2 VIEWS: CPT

## 2024-12-19 PROCEDURE — G2211 COMPLEX E/M VISIT ADD ON: CPT

## 2024-12-19 PROCEDURE — 93248 EXT ECG>7D<15D REV&INTERPJ: CPT

## 2024-12-19 RX ORDER — ALBUTEROL SULFATE 90 UG/1
108 (90 BASE) AEROSOL, METERED RESPIRATORY (INHALATION) EVERY 4 HOURS
Qty: 1 | Refills: 0 | Status: ACTIVE | COMMUNITY
Start: 2024-12-19 | End: 1900-01-01

## 2024-12-26 ENCOUNTER — TRANSCRIPTION ENCOUNTER (OUTPATIENT)
Age: 74
End: 2024-12-26

## 2024-12-26 RX ORDER — FLUTICASONE PROPIONATE AND SALMETEROL 250; 50 UG/1; UG/1
250-50 POWDER RESPIRATORY (INHALATION)
Qty: 1 | Refills: 0 | Status: ACTIVE | COMMUNITY
Start: 2024-12-26 | End: 1900-01-01

## 2025-01-02 ENCOUNTER — APPOINTMENT (OUTPATIENT)
Dept: PULMONOLOGY | Facility: CLINIC | Age: 75
End: 2025-01-02
Payer: MEDICARE

## 2025-01-02 PROCEDURE — 94010 BREATHING CAPACITY TEST: CPT

## 2025-01-02 PROCEDURE — 94618 PULMONARY STRESS TESTING: CPT

## 2025-01-02 PROCEDURE — 94729 DIFFUSING CAPACITY: CPT

## 2025-01-02 PROCEDURE — 94727 GAS DIL/WSHOT DETER LNG VOL: CPT

## 2025-01-06 ENCOUNTER — RX RENEWAL (OUTPATIENT)
Age: 75
End: 2025-01-06

## 2025-01-09 ENCOUNTER — NON-APPOINTMENT (OUTPATIENT)
Age: 75
End: 2025-01-09

## 2025-01-10 ENCOUNTER — NON-APPOINTMENT (OUTPATIENT)
Age: 75
End: 2025-01-10

## 2025-01-14 ENCOUNTER — APPOINTMENT (OUTPATIENT)
Dept: PULMONOLOGY | Facility: CLINIC | Age: 75
End: 2025-01-14
Payer: MEDICARE

## 2025-01-14 ENCOUNTER — APPOINTMENT (OUTPATIENT)
Dept: PULMONOLOGY | Facility: CLINIC | Age: 75
End: 2025-01-14

## 2025-01-14 DIAGNOSIS — G47.33 OBSTRUCTIVE SLEEP APNEA (ADULT) (PEDIATRIC): ICD-10-CM

## 2025-01-14 DIAGNOSIS — J84.9 INTERSTITIAL PULMONARY DISEASE, UNSPECIFIED: ICD-10-CM

## 2025-01-14 DIAGNOSIS — J40 BRONCHITIS, NOT SPECIFIED AS ACUTE OR CHRONIC: ICD-10-CM

## 2025-01-14 PROCEDURE — 99213 OFFICE O/P EST LOW 20 MIN: CPT

## 2025-01-16 ENCOUNTER — APPOINTMENT (OUTPATIENT)
Dept: DERMATOLOGY | Facility: CLINIC | Age: 75
End: 2025-01-16
Payer: MEDICARE

## 2025-01-16 ENCOUNTER — NON-APPOINTMENT (OUTPATIENT)
Age: 75
End: 2025-01-16

## 2025-01-16 DIAGNOSIS — D48.7 NEOPLASM OF UNCERTAIN BEHAVIOR OF OTHER SPECIFIED SITES: ICD-10-CM

## 2025-01-16 DIAGNOSIS — D48.9 NEOPLASM OF UNCERTAIN BEHAVIOR, UNSPECIFIED: ICD-10-CM

## 2025-01-16 DIAGNOSIS — D48.5 NEOPLASM OF UNCERTAIN BEHAVIOR OF SKIN: ICD-10-CM

## 2025-01-16 DIAGNOSIS — L82.1 OTHER SEBORRHEIC KERATOSIS: ICD-10-CM

## 2025-01-16 PROCEDURE — 99213 OFFICE O/P EST LOW 20 MIN: CPT | Mod: 25

## 2025-01-16 PROCEDURE — 11104 PUNCH BX SKIN SINGLE LESION: CPT

## 2025-01-16 PROCEDURE — 11103 TANGNTL BX SKIN EA SEP/ADDL: CPT

## 2025-01-20 ENCOUNTER — NON-APPOINTMENT (OUTPATIENT)
Age: 75
End: 2025-01-20

## 2025-01-24 LAB — DERMATOLOGY BIOPSY: NORMAL

## 2025-01-29 ENCOUNTER — NON-APPOINTMENT (OUTPATIENT)
Age: 75
End: 2025-01-29

## 2025-01-29 ENCOUNTER — LABORATORY RESULT (OUTPATIENT)
Age: 75
End: 2025-01-29

## 2025-01-30 ENCOUNTER — NON-APPOINTMENT (OUTPATIENT)
Age: 75
End: 2025-01-30

## 2025-01-31 ENCOUNTER — NON-APPOINTMENT (OUTPATIENT)
Age: 75
End: 2025-01-31

## 2025-01-31 RX ORDER — MUPIROCIN 20 MG/G
2 OINTMENT TOPICAL
Qty: 1 | Refills: 0 | Status: ACTIVE | COMMUNITY
Start: 2025-01-31

## 2025-02-03 ENCOUNTER — NON-APPOINTMENT (OUTPATIENT)
Age: 75
End: 2025-02-03

## 2025-02-04 ENCOUNTER — NON-APPOINTMENT (OUTPATIENT)
Age: 75
End: 2025-02-04

## 2025-02-04 ENCOUNTER — APPOINTMENT (OUTPATIENT)
Dept: INTERNAL MEDICINE | Facility: CLINIC | Age: 75
End: 2025-02-04
Payer: MEDICARE

## 2025-02-04 DIAGNOSIS — R06.02 SHORTNESS OF BREATH: ICD-10-CM

## 2025-02-04 DIAGNOSIS — F41.9 ANXIETY DISORDER, UNSPECIFIED: ICD-10-CM

## 2025-02-04 PROCEDURE — 99213 OFFICE O/P EST LOW 20 MIN: CPT | Mod: 2W

## 2025-02-04 PROCEDURE — G2211 COMPLEX E/M VISIT ADD ON: CPT | Mod: 2W

## 2025-02-05 ENCOUNTER — APPOINTMENT (OUTPATIENT)
Dept: CT IMAGING | Facility: CLINIC | Age: 75
End: 2025-02-05
Payer: MEDICARE

## 2025-02-05 PROCEDURE — 71250 CT THORAX DX C-: CPT

## 2025-02-06 ENCOUNTER — APPOINTMENT (OUTPATIENT)
Dept: INTERNAL MEDICINE | Facility: CLINIC | Age: 75
End: 2025-02-06

## 2025-02-10 ENCOUNTER — NON-APPOINTMENT (OUTPATIENT)
Age: 75
End: 2025-02-10

## 2025-02-11 ENCOUNTER — LABORATORY RESULT (OUTPATIENT)
Age: 75
End: 2025-02-11

## 2025-02-11 ENCOUNTER — APPOINTMENT (OUTPATIENT)
Dept: PULMONOLOGY | Facility: CLINIC | Age: 75
End: 2025-02-11
Payer: MEDICARE

## 2025-02-11 PROCEDURE — 36415 COLL VENOUS BLD VENIPUNCTURE: CPT

## 2025-02-12 ENCOUNTER — NON-APPOINTMENT (OUTPATIENT)
Age: 75
End: 2025-02-12

## 2025-02-17 ENCOUNTER — NON-APPOINTMENT (OUTPATIENT)
Age: 75
End: 2025-02-17

## 2025-02-18 ENCOUNTER — APPOINTMENT (OUTPATIENT)
Dept: PULMONOLOGY | Facility: CLINIC | Age: 75
End: 2025-02-18
Payer: MEDICARE

## 2025-02-18 DIAGNOSIS — G47.33 OBSTRUCTIVE SLEEP APNEA (ADULT) (PEDIATRIC): ICD-10-CM

## 2025-02-18 DIAGNOSIS — J40 BRONCHITIS, NOT SPECIFIED AS ACUTE OR CHRONIC: ICD-10-CM

## 2025-02-18 DIAGNOSIS — J84.9 INTERSTITIAL PULMONARY DISEASE, UNSPECIFIED: ICD-10-CM

## 2025-02-18 PROCEDURE — 99214 OFFICE O/P EST MOD 30 MIN: CPT | Mod: 2W

## 2025-02-18 PROCEDURE — G2211 COMPLEX E/M VISIT ADD ON: CPT | Mod: 2W

## 2025-02-20 ENCOUNTER — APPOINTMENT (OUTPATIENT)
Dept: PULMONOLOGY | Facility: CLINIC | Age: 75
End: 2025-02-20
Payer: MEDICARE

## 2025-02-20 PROCEDURE — 36415 COLL VENOUS BLD VENIPUNCTURE: CPT

## 2025-02-21 ENCOUNTER — NON-APPOINTMENT (OUTPATIENT)
Age: 75
End: 2025-02-21

## 2025-02-21 LAB
ALBUMIN SERPL ELPH-MCNC: 4.3 G/DL
ALP BLD-CCNC: 95 U/L
ALT SERPL-CCNC: 13 U/L
ANION GAP SERPL CALC-SCNC: 11 MMOL/L
AST SERPL-CCNC: 14 U/L
BASOPHILS # BLD AUTO: 0.05 K/UL
BASOPHILS NFR BLD AUTO: 0.6 %
BILIRUB SERPL-MCNC: 1 MG/DL
BUN SERPL-MCNC: 15 MG/DL
CALCIUM SERPL-MCNC: 9.8 MG/DL
CHLORIDE SERPL-SCNC: 100 MMOL/L
CO2 SERPL-SCNC: 30 MMOL/L
CREAT SERPL-MCNC: 0.58 MG/DL
EGFR: 94 ML/MIN/1.73M2
EOSINOPHIL # BLD AUTO: 0.14 K/UL
EOSINOPHIL NFR BLD AUTO: 1.7 %
GLUCOSE SERPL-MCNC: 89 MG/DL
HCT VFR BLD CALC: 45.4 %
HGB BLD-MCNC: 14.5 G/DL
IMM GRANULOCYTES NFR BLD AUTO: 0.2 %
LYMPHOCYTES # BLD AUTO: 2.03 K/UL
LYMPHOCYTES NFR BLD AUTO: 24.5 %
MAN DIFF?: NORMAL
MCHC RBC-ENTMCNC: 28.8 PG
MCHC RBC-ENTMCNC: 31.9 G/DL
MCV RBC AUTO: 90.1 FL
MONOCYTES # BLD AUTO: 0.74 K/UL
MONOCYTES NFR BLD AUTO: 8.9 %
NEUTROPHILS # BLD AUTO: 5.32 K/UL
NEUTROPHILS NFR BLD AUTO: 64.1 %
PLATELET # BLD AUTO: 232 K/UL
POTASSIUM SERPL-SCNC: 4 MMOL/L
PROT SERPL-MCNC: 6.9 G/DL
RBC # BLD: 5.04 M/UL
RBC # FLD: 14.7 %
SODIUM SERPL-SCNC: 142 MMOL/L
WBC # FLD AUTO: 8.3 K/UL

## 2025-02-25 ENCOUNTER — TRANSCRIPTION ENCOUNTER (OUTPATIENT)
Age: 75
End: 2025-02-25

## 2025-02-25 ENCOUNTER — NON-APPOINTMENT (OUTPATIENT)
Age: 75
End: 2025-02-25

## 2025-02-25 RX ORDER — NINTEDANIB 150 MG/1
150 CAPSULE ORAL
Qty: 60 | Refills: 5 | Status: ACTIVE | COMMUNITY
Start: 2025-02-18 | End: 1900-01-01

## 2025-02-26 ENCOUNTER — TRANSCRIPTION ENCOUNTER (OUTPATIENT)
Age: 75
End: 2025-02-26

## 2025-02-27 ENCOUNTER — TRANSCRIPTION ENCOUNTER (OUTPATIENT)
Age: 75
End: 2025-02-27

## 2025-02-28 ENCOUNTER — NON-APPOINTMENT (OUTPATIENT)
Age: 75
End: 2025-02-28

## 2025-02-28 ENCOUNTER — APPOINTMENT (OUTPATIENT)
Dept: OPHTHALMOLOGY | Facility: CLINIC | Age: 75
End: 2025-02-28
Payer: MEDICARE

## 2025-02-28 PROCEDURE — 92134 CPTRZ OPH DX IMG PST SGM RTA: CPT

## 2025-02-28 PROCEDURE — 67028 INJECTION EYE DRUG: CPT | Mod: LT

## 2025-02-28 PROCEDURE — 92014 COMPRE OPH EXAM EST PT 1/>: CPT | Mod: 25

## 2025-03-11 ENCOUNTER — NON-APPOINTMENT (OUTPATIENT)
Age: 75
End: 2025-03-11

## 2025-03-12 ENCOUNTER — APPOINTMENT (OUTPATIENT)
Dept: GASTROENTEROLOGY | Facility: CLINIC | Age: 75
End: 2025-03-12
Payer: MEDICARE

## 2025-03-12 VITALS
SYSTOLIC BLOOD PRESSURE: 170 MMHG | DIASTOLIC BLOOD PRESSURE: 80 MMHG | OXYGEN SATURATION: 94 % | RESPIRATION RATE: 14 BRPM | WEIGHT: 183 LBS | BODY MASS INDEX: 29.41 KG/M2 | TEMPERATURE: 97.3 F | HEIGHT: 66 IN | HEART RATE: 65 BPM

## 2025-03-12 DIAGNOSIS — K21.9 GASTRO-ESOPHAGEAL REFLUX DISEASE W/OUT ESOPHAGITIS: ICD-10-CM

## 2025-03-12 DIAGNOSIS — Z12.11 ENCOUNTER FOR SCREENING FOR MALIGNANT NEOPLASM OF COLON: ICD-10-CM

## 2025-03-12 PROCEDURE — G2211 COMPLEX E/M VISIT ADD ON: CPT

## 2025-03-12 PROCEDURE — 99214 OFFICE O/P EST MOD 30 MIN: CPT

## 2025-03-13 ENCOUNTER — NON-APPOINTMENT (OUTPATIENT)
Age: 75
End: 2025-03-13

## 2025-03-13 RX ORDER — PIRFENIDONE 267 MG/1
267 TABLET, COATED ORAL
Qty: 126 | Refills: 0 | Status: ACTIVE | COMMUNITY
Start: 2025-03-13 | End: 1900-01-01

## 2025-03-13 RX ORDER — PIRFENIDONE 801 MG/1
801 TABLET, COATED ORAL 3 TIMES DAILY
Qty: 90 | Refills: 5 | Status: ACTIVE | COMMUNITY
Start: 2025-03-13 | End: 1900-01-01

## 2025-03-17 ENCOUNTER — NON-APPOINTMENT (OUTPATIENT)
Age: 75
End: 2025-03-17

## 2025-03-19 ENCOUNTER — NON-APPOINTMENT (OUTPATIENT)
Age: 75
End: 2025-03-19

## 2025-04-01 ENCOUNTER — NON-APPOINTMENT (OUTPATIENT)
Age: 75
End: 2025-04-01

## 2025-04-16 ENCOUNTER — NON-APPOINTMENT (OUTPATIENT)
Age: 75
End: 2025-04-16

## 2025-04-17 ENCOUNTER — NON-APPOINTMENT (OUTPATIENT)
Age: 75
End: 2025-04-17

## 2025-04-22 ENCOUNTER — RX RENEWAL (OUTPATIENT)
Age: 75
End: 2025-04-22

## 2025-05-05 ENCOUNTER — NON-APPOINTMENT (OUTPATIENT)
Age: 75
End: 2025-05-05

## 2025-05-05 RX ORDER — NINTEDANIB 100 MG/1
100 CAPSULE ORAL
Qty: 60 | Refills: 5 | Status: ACTIVE | COMMUNITY
Start: 2025-05-05 | End: 1900-01-01

## 2025-05-14 ENCOUNTER — NON-APPOINTMENT (OUTPATIENT)
Age: 75
End: 2025-05-14

## 2025-05-14 ENCOUNTER — APPOINTMENT (OUTPATIENT)
Dept: HEART AND VASCULAR | Facility: CLINIC | Age: 75
End: 2025-05-14
Payer: MEDICARE

## 2025-05-14 VITALS
DIASTOLIC BLOOD PRESSURE: 80 MMHG | HEIGHT: 66 IN | OXYGEN SATURATION: 96 % | TEMPERATURE: 98 F | BODY MASS INDEX: 30.05 KG/M2 | WEIGHT: 187 LBS | SYSTOLIC BLOOD PRESSURE: 162 MMHG | HEART RATE: 61 BPM

## 2025-05-14 DIAGNOSIS — G47.30 SLEEP APNEA, UNSPECIFIED: ICD-10-CM

## 2025-05-14 DIAGNOSIS — I51.7 CARDIOMEGALY: ICD-10-CM

## 2025-05-14 PROCEDURE — 93000 ELECTROCARDIOGRAM COMPLETE: CPT

## 2025-05-14 PROCEDURE — G2211 COMPLEX E/M VISIT ADD ON: CPT

## 2025-05-14 PROCEDURE — 99214 OFFICE O/P EST MOD 30 MIN: CPT

## 2025-05-14 RX ORDER — TIRZEPATIDE 2.5 MG/.5ML
2.5 INJECTION, SOLUTION SUBCUTANEOUS
Qty: 4 | Refills: 5 | Status: ACTIVE | COMMUNITY
Start: 2025-05-14 | End: 1900-01-01

## 2025-05-15 PROBLEM — G47.30 SLEEP APNEA: Status: ACTIVE | Noted: 2025-05-15

## 2025-05-30 ENCOUNTER — APPOINTMENT (OUTPATIENT)
Dept: OPHTHALMOLOGY | Facility: CLINIC | Age: 75
End: 2025-05-30
Payer: MEDICARE

## 2025-05-30 ENCOUNTER — NON-APPOINTMENT (OUTPATIENT)
Age: 75
End: 2025-05-30

## 2025-05-30 PROCEDURE — 67028 INJECTION EYE DRUG: CPT | Mod: LT

## 2025-05-30 PROCEDURE — 92012 INTRM OPH EXAM EST PATIENT: CPT | Mod: 25

## 2025-05-30 PROCEDURE — 92134 CPTRZ OPH DX IMG PST SGM RTA: CPT

## 2025-06-17 ENCOUNTER — TRANSCRIPTION ENCOUNTER (OUTPATIENT)
Age: 75
End: 2025-06-17

## 2025-06-18 ENCOUNTER — TRANSCRIPTION ENCOUNTER (OUTPATIENT)
Age: 75
End: 2025-06-18

## 2025-06-19 ENCOUNTER — TRANSCRIPTION ENCOUNTER (OUTPATIENT)
Age: 75
End: 2025-06-19

## 2025-06-23 ENCOUNTER — APPOINTMENT (OUTPATIENT)
Dept: NEUROLOGY | Facility: CLINIC | Age: 75
End: 2025-06-23
Payer: MEDICARE

## 2025-06-23 VITALS
OXYGEN SATURATION: 95 % | BODY MASS INDEX: 29.89 KG/M2 | HEIGHT: 66 IN | SYSTOLIC BLOOD PRESSURE: 179 MMHG | WEIGHT: 186 LBS | DIASTOLIC BLOOD PRESSURE: 82 MMHG | HEART RATE: 75 BPM | TEMPERATURE: 98.2 F

## 2025-06-23 PROCEDURE — 99215 OFFICE O/P EST HI 40 MIN: CPT

## 2025-06-23 RX ORDER — SAW/PYGEUM/BETA/HERB/D3/B6/ZN 30 MG-25MG
100 CAPSULE ORAL
Qty: 120 | Refills: 5 | Status: ACTIVE | COMMUNITY
Start: 2025-06-23 | End: 1900-01-01

## 2025-06-26 ENCOUNTER — TRANSCRIPTION ENCOUNTER (OUTPATIENT)
Age: 75
End: 2025-06-26

## 2025-06-27 ENCOUNTER — LABORATORY RESULT (OUTPATIENT)
Age: 75
End: 2025-06-27

## 2025-06-27 ENCOUNTER — TRANSCRIPTION ENCOUNTER (OUTPATIENT)
Age: 75
End: 2025-06-27

## 2025-06-27 ENCOUNTER — APPOINTMENT (OUTPATIENT)
Dept: INTERNAL MEDICINE | Facility: CLINIC | Age: 75
End: 2025-06-27
Payer: MEDICARE

## 2025-06-27 ENCOUNTER — NON-APPOINTMENT (OUTPATIENT)
Age: 75
End: 2025-06-27

## 2025-06-27 VITALS
HEIGHT: 66 IN | WEIGHT: 183 LBS | DIASTOLIC BLOOD PRESSURE: 77 MMHG | OXYGEN SATURATION: 96 % | HEART RATE: 65 BPM | SYSTOLIC BLOOD PRESSURE: 149 MMHG | BODY MASS INDEX: 29.41 KG/M2 | TEMPERATURE: 97.9 F

## 2025-06-27 PROCEDURE — G2211 COMPLEX E/M VISIT ADD ON: CPT

## 2025-06-27 PROCEDURE — 99214 OFFICE O/P EST MOD 30 MIN: CPT

## 2025-06-27 PROCEDURE — 36415 COLL VENOUS BLD VENIPUNCTURE: CPT

## 2025-06-30 PROBLEM — R10.9 ABDOMINAL PAIN: Status: ACTIVE | Noted: 2025-06-27

## 2025-06-30 LAB
ALBUMIN SERPL ELPH-MCNC: 4.4 G/DL
ALP BLD-CCNC: 82 U/L
ALT SERPL-CCNC: 23 U/L
ANION GAP SERPL CALC-SCNC: 17 MMOL/L
APPEARANCE: ABNORMAL
AST SERPL-CCNC: 25 U/L
BASOPHILS # BLD AUTO: 0.05 K/UL
BASOPHILS NFR BLD AUTO: 0.6 %
BILIRUB SERPL-MCNC: 1.4 MG/DL
BILIRUBIN URINE: ABNORMAL
BLOOD URINE: NEGATIVE
BUN SERPL-MCNC: 14 MG/DL
CALCIUM SERPL-MCNC: 10.2 MG/DL
CHLORIDE SERPL-SCNC: 97 MMOL/L
CO2 SERPL-SCNC: 24 MMOL/L
COLOR: NORMAL
CREAT SERPL-MCNC: 0.82 MG/DL
EGFRCR SERPLBLD CKD-EPI 2021: 75 ML/MIN/1.73M2
EOSINOPHIL # BLD AUTO: 0.11 K/UL
EOSINOPHIL NFR BLD AUTO: 1.3 %
GLUCOSE QUALITATIVE U: NEGATIVE MG/DL
GLUCOSE SERPL-MCNC: 99 MG/DL
HCT VFR BLD CALC: 47.4 %
HGB BLD-MCNC: 15.2 G/DL
IMM GRANULOCYTES NFR BLD AUTO: 0.2 %
KETONES URINE: ABNORMAL MG/DL
LEUKOCYTE ESTERASE URINE: ABNORMAL
LPL SERPL-CCNC: 35 U/L
LYMPHOCYTES # BLD AUTO: 2.1 K/UL
LYMPHOCYTES NFR BLD AUTO: 24.4 %
MAN DIFF?: NORMAL
MCHC RBC-ENTMCNC: 28.2 PG
MCHC RBC-ENTMCNC: 32.1 G/DL
MCV RBC AUTO: 87.9 FL
MONOCYTES # BLD AUTO: 0.66 K/UL
MONOCYTES NFR BLD AUTO: 7.7 %
NEUTROPHILS # BLD AUTO: 5.68 K/UL
NEUTROPHILS NFR BLD AUTO: 65.8 %
NITRITE URINE: NEGATIVE
PH URINE: 7
PLATELET # BLD AUTO: 252 K/UL
POTASSIUM SERPL-SCNC: 4.1 MMOL/L
PROT SERPL-MCNC: 6.8 G/DL
PROTEIN URINE: 30 MG/DL
RBC # BLD: 5.39 M/UL
RBC # FLD: 15.5 %
SODIUM SERPL-SCNC: 139 MMOL/L
SPECIFIC GRAVITY URINE: 1.02
UROBILINOGEN URINE: 1 MG/DL
WBC # FLD AUTO: 8.62 K/UL

## 2025-07-01 ENCOUNTER — NON-APPOINTMENT (OUTPATIENT)
Age: 75
End: 2025-07-01

## 2025-07-02 ENCOUNTER — OUTPATIENT (OUTPATIENT)
Dept: OUTPATIENT SERVICES | Facility: HOSPITAL | Age: 75
LOS: 1 days | End: 2025-07-02
Payer: MEDICARE

## 2025-07-02 ENCOUNTER — NON-APPOINTMENT (OUTPATIENT)
Age: 75
End: 2025-07-02

## 2025-07-02 ENCOUNTER — APPOINTMENT (OUTPATIENT)
Dept: CT IMAGING | Facility: HOSPITAL | Age: 75
End: 2025-07-02

## 2025-07-02 ENCOUNTER — APPOINTMENT (OUTPATIENT)
Dept: PULMONOLOGY | Facility: CLINIC | Age: 75
End: 2025-07-02
Payer: MEDICARE

## 2025-07-02 DIAGNOSIS — Z98.49 CATARACT EXTRACTION STATUS, UNSPECIFIED EYE: Chronic | ICD-10-CM

## 2025-07-02 PROCEDURE — 36415 COLL VENOUS BLD VENIPUNCTURE: CPT

## 2025-07-02 PROCEDURE — 71250 CT THORAX DX C-: CPT

## 2025-07-02 PROCEDURE — 71250 CT THORAX DX C-: CPT | Mod: 26

## 2025-07-03 ENCOUNTER — NON-APPOINTMENT (OUTPATIENT)
Age: 75
End: 2025-07-03

## 2025-07-03 LAB
ALBUMIN SERPL ELPH-MCNC: 4.3 G/DL
ALP BLD-CCNC: 78 U/L
ALT SERPL-CCNC: 22 U/L
ANION GAP SERPL CALC-SCNC: 15 MMOL/L
AST SERPL-CCNC: 24 U/L
BASOPHILS # BLD AUTO: 0.05 K/UL
BASOPHILS NFR BLD AUTO: 0.5 %
BILIRUB SERPL-MCNC: 0.9 MG/DL
BUN SERPL-MCNC: 14 MG/DL
CALCIUM SERPL-MCNC: 9.7 MG/DL
CHLORIDE SERPL-SCNC: 99 MMOL/L
CO2 SERPL-SCNC: 24 MMOL/L
CREAT SERPL-MCNC: 0.77 MG/DL
EGFRCR SERPLBLD CKD-EPI 2021: 80 ML/MIN/1.73M2
EOSINOPHIL # BLD AUTO: 0.15 K/UL
EOSINOPHIL NFR BLD AUTO: 1.5 %
GLUCOSE SERPL-MCNC: 87 MG/DL
HCT VFR BLD CALC: 43.2 %
HGB BLD-MCNC: 14 G/DL
IMM GRANULOCYTES NFR BLD AUTO: 0.2 %
LYMPHOCYTES # BLD AUTO: 2.59 K/UL
LYMPHOCYTES NFR BLD AUTO: 26.3 %
MAN DIFF?: NORMAL
MCHC RBC-ENTMCNC: 28.4 PG
MCHC RBC-ENTMCNC: 32.4 G/DL
MCV RBC AUTO: 87.6 FL
MONOCYTES # BLD AUTO: 0.87 K/UL
MONOCYTES NFR BLD AUTO: 8.8 %
NEUTROPHILS # BLD AUTO: 6.17 K/UL
NEUTROPHILS NFR BLD AUTO: 62.7 %
PLATELET # BLD AUTO: 242 K/UL
POTASSIUM SERPL-SCNC: 4.3 MMOL/L
PROT SERPL-MCNC: 6.5 G/DL
RBC # BLD: 4.93 M/UL
RBC # FLD: 15.2 %
SODIUM SERPL-SCNC: 138 MMOL/L
WBC # FLD AUTO: 9.85 K/UL

## 2025-07-08 ENCOUNTER — APPOINTMENT (OUTPATIENT)
Dept: PULMONOLOGY | Facility: CLINIC | Age: 75
End: 2025-07-08
Payer: MEDICARE

## 2025-07-08 ENCOUNTER — TRANSCRIPTION ENCOUNTER (OUTPATIENT)
Age: 75
End: 2025-07-08

## 2025-07-08 PROCEDURE — 99214 OFFICE O/P EST MOD 30 MIN: CPT | Mod: 2W

## 2025-07-08 RX ORDER — PREDNISONE 20 MG/1
20 TABLET ORAL DAILY
Qty: 60 | Refills: 1 | Status: ACTIVE | COMMUNITY
Start: 2025-07-08 | End: 1900-01-01

## 2025-07-08 RX ORDER — SULFAMETHOXAZOLE AND TRIMETHOPRIM 800; 160 MG/1; MG/1
800-160 TABLET ORAL DAILY
Qty: 12 | Refills: 5 | Status: ACTIVE | COMMUNITY
Start: 2025-07-08 | End: 1900-01-01

## 2025-07-10 ENCOUNTER — TRANSCRIPTION ENCOUNTER (OUTPATIENT)
Age: 75
End: 2025-07-10

## 2025-07-23 ENCOUNTER — TRANSCRIPTION ENCOUNTER (OUTPATIENT)
Age: 75
End: 2025-07-23

## 2025-07-29 ENCOUNTER — TRANSCRIPTION ENCOUNTER (OUTPATIENT)
Age: 75
End: 2025-07-29

## 2025-08-06 ENCOUNTER — TRANSCRIPTION ENCOUNTER (OUTPATIENT)
Age: 75
End: 2025-08-06

## 2025-08-06 ENCOUNTER — APPOINTMENT (OUTPATIENT)
Dept: PULMONOLOGY | Facility: CLINIC | Age: 75
End: 2025-08-06
Payer: MEDICARE

## 2025-08-06 PROCEDURE — 94010 BREATHING CAPACITY TEST: CPT

## 2025-08-06 PROCEDURE — 94618 PULMONARY STRESS TESTING: CPT

## 2025-08-06 PROCEDURE — 94727 GAS DIL/WSHOT DETER LNG VOL: CPT

## 2025-08-06 PROCEDURE — ZZZZZ: CPT

## 2025-08-06 PROCEDURE — 94729 DIFFUSING CAPACITY: CPT

## 2025-08-07 ENCOUNTER — LABORATORY RESULT (OUTPATIENT)
Age: 75
End: 2025-08-07

## 2025-08-07 ENCOUNTER — APPOINTMENT (OUTPATIENT)
Dept: INTERNAL MEDICINE | Facility: CLINIC | Age: 75
End: 2025-08-07
Payer: MEDICARE

## 2025-08-07 VITALS
TEMPERATURE: 98 F | WEIGHT: 183 LBS | HEIGHT: 66 IN | HEART RATE: 57 BPM | BODY MASS INDEX: 29.41 KG/M2 | OXYGEN SATURATION: 96 %

## 2025-08-07 DIAGNOSIS — J84.9 INTERSTITIAL PULMONARY DISEASE, UNSPECIFIED: ICD-10-CM

## 2025-08-07 DIAGNOSIS — G47.33 OBSTRUCTIVE SLEEP APNEA (ADULT) (PEDIATRIC): ICD-10-CM

## 2025-08-07 DIAGNOSIS — R35.89 OTHER POLYURIA: ICD-10-CM

## 2025-08-07 PROCEDURE — 99214 OFFICE O/P EST MOD 30 MIN: CPT

## 2025-08-07 PROCEDURE — 36415 COLL VENOUS BLD VENIPUNCTURE: CPT

## 2025-08-07 PROCEDURE — G2211 COMPLEX E/M VISIT ADD ON: CPT

## 2025-08-11 LAB
ALBUMIN SERPL ELPH-MCNC: 4.3 G/DL
ALP BLD-CCNC: 74 U/L
ALT SERPL-CCNC: 44 U/L
ANION GAP SERPL CALC-SCNC: 14 MMOL/L
APPEARANCE: CLEAR
AST SERPL-CCNC: 28 U/L
BASOPHILS # BLD AUTO: 0.03 K/UL
BASOPHILS NFR BLD AUTO: 0.2 %
BILIRUB SERPL-MCNC: 1.1 MG/DL
BILIRUBIN URINE: NEGATIVE
BLOOD URINE: NEGATIVE
BUN SERPL-MCNC: 12 MG/DL
CALCIUM SERPL-MCNC: 10.4 MG/DL
CHLORIDE SERPL-SCNC: 99 MMOL/L
CHOLEST SERPL-MCNC: 187 MG/DL
CO2 SERPL-SCNC: 28 MMOL/L
COLOR: YELLOW
CREAT SERPL-MCNC: 0.58 MG/DL
EGFRCR SERPLBLD CKD-EPI 2021: 94 ML/MIN/1.73M2
EOSINOPHIL # BLD AUTO: 0 K/UL
EOSINOPHIL NFR BLD AUTO: 0 %
ESTIMATED AVERAGE GLUCOSE: 114 MG/DL
GLUCOSE QUALITATIVE U: NEGATIVE MG/DL
GLUCOSE SERPL-MCNC: 100 MG/DL
HBA1C MFR BLD HPLC: 5.6 %
HCT VFR BLD CALC: 48.5 %
HDLC SERPL-MCNC: 88 MG/DL
HGB BLD-MCNC: 15.7 G/DL
IMM GRANULOCYTES NFR BLD AUTO: 0.5 %
KETONES URINE: NEGATIVE MG/DL
LDLC SERPL-MCNC: 82 MG/DL
LEUKOCYTE ESTERASE URINE: ABNORMAL
LYMPHOCYTES # BLD AUTO: 1.38 K/UL
LYMPHOCYTES NFR BLD AUTO: 10 %
MAN DIFF?: NORMAL
MCHC RBC-ENTMCNC: 29.1 PG
MCHC RBC-ENTMCNC: 32.4 G/DL
MCV RBC AUTO: 89.8 FL
MONOCYTES # BLD AUTO: 0.48 K/UL
MONOCYTES NFR BLD AUTO: 3.5 %
NEUTROPHILS # BLD AUTO: 11.79 K/UL
NEUTROPHILS NFR BLD AUTO: 85.8 %
NITRITE URINE: NEGATIVE
NONHDLC SERPL-MCNC: 98 MG/DL
PH URINE: 7
PLATELET # BLD AUTO: 229 K/UL
POTASSIUM SERPL-SCNC: 4.7 MMOL/L
PROT SERPL-MCNC: 6.8 G/DL
PROTEIN URINE: NEGATIVE MG/DL
RBC # BLD: 5.4 M/UL
RBC # FLD: 16.8 %
SODIUM SERPL-SCNC: 141 MMOL/L
SPECIFIC GRAVITY URINE: 1.01
TRIGL SERPL-MCNC: 96 MG/DL
UROBILINOGEN URINE: 0.2 MG/DL
WBC # FLD AUTO: 13.75 K/UL

## 2025-08-12 ENCOUNTER — APPOINTMENT (OUTPATIENT)
Dept: HEART AND VASCULAR | Facility: CLINIC | Age: 75
End: 2025-08-12
Payer: MEDICARE

## 2025-08-12 ENCOUNTER — APPOINTMENT (OUTPATIENT)
Dept: INTERNAL MEDICINE | Facility: CLINIC | Age: 75
End: 2025-08-12
Payer: MEDICARE

## 2025-08-12 VITALS
SYSTOLIC BLOOD PRESSURE: 120 MMHG | HEART RATE: 64 BPM | HEIGHT: 66 IN | BODY MASS INDEX: 29.25 KG/M2 | WEIGHT: 182 LBS | TEMPERATURE: 98 F | DIASTOLIC BLOOD PRESSURE: 74 MMHG | OXYGEN SATURATION: 97 %

## 2025-08-12 VITALS — SYSTOLIC BLOOD PRESSURE: 100 MMHG | DIASTOLIC BLOOD PRESSURE: 70 MMHG

## 2025-08-12 DIAGNOSIS — I65.29 OCCLUSION AND STENOSIS OF UNSPECIFIED CAROTID ARTERY: ICD-10-CM

## 2025-08-12 DIAGNOSIS — I10 ESSENTIAL (PRIMARY) HYPERTENSION: ICD-10-CM

## 2025-08-12 DIAGNOSIS — I51.7 CARDIOMEGALY: ICD-10-CM

## 2025-08-12 PROCEDURE — G2211 COMPLEX E/M VISIT ADD ON: CPT

## 2025-08-12 PROCEDURE — 99214 OFFICE O/P EST MOD 30 MIN: CPT

## 2025-08-12 PROCEDURE — 93306 TTE W/DOPPLER COMPLETE: CPT

## 2025-08-12 PROCEDURE — 93000 ELECTROCARDIOGRAM COMPLETE: CPT

## 2025-08-12 RX ORDER — CHLORTHALIDONE 25 MG/1
25 TABLET ORAL
Qty: 30 | Refills: 0 | Status: DISCONTINUED | COMMUNITY
Start: 2025-08-07 | End: 2025-08-12

## 2025-08-12 RX ORDER — EZETIMIBE 10 MG/1
10 TABLET ORAL
Qty: 90 | Refills: 3 | Status: ACTIVE | COMMUNITY
Start: 2025-08-12 | End: 1900-01-01

## 2025-08-12 RX ORDER — ROSUVASTATIN CALCIUM 40 MG/1
40 TABLET, FILM COATED ORAL
Qty: 90 | Refills: 3 | Status: ACTIVE | COMMUNITY
Start: 2025-08-12 | End: 1900-01-01

## 2025-08-25 ENCOUNTER — APPOINTMENT (OUTPATIENT)
Dept: PULMONOLOGY | Facility: CLINIC | Age: 75
End: 2025-08-25
Payer: MEDICARE

## 2025-08-25 DIAGNOSIS — K21.9 GASTRO-ESOPHAGEAL REFLUX DISEASE W/OUT ESOPHAGITIS: ICD-10-CM

## 2025-08-25 DIAGNOSIS — G47.33 OBSTRUCTIVE SLEEP APNEA (ADULT) (PEDIATRIC): ICD-10-CM

## 2025-08-25 DIAGNOSIS — J84.9 INTERSTITIAL PULMONARY DISEASE, UNSPECIFIED: ICD-10-CM

## 2025-08-25 DIAGNOSIS — R06.02 SHORTNESS OF BREATH: ICD-10-CM

## 2025-08-25 PROCEDURE — 99214 OFFICE O/P EST MOD 30 MIN: CPT | Mod: 2W

## 2025-08-25 RX ORDER — MYCOPHENOLATE MOFETIL 500 MG/1
500 TABLET ORAL
Qty: 180 | Refills: 2 | Status: ACTIVE | COMMUNITY
Start: 2025-08-25 | End: 1900-01-01

## 2025-08-25 RX ORDER — PREDNISONE 20 MG/1
20 TABLET ORAL
Qty: 60 | Refills: 1 | Status: ACTIVE | COMMUNITY
Start: 2025-08-25 | End: 1900-01-01

## 2025-08-26 ENCOUNTER — APPOINTMENT (OUTPATIENT)
Dept: HEART AND VASCULAR | Facility: CLINIC | Age: 75
End: 2025-08-26
Payer: MEDICARE

## 2025-08-26 VITALS
HEART RATE: 70 BPM | SYSTOLIC BLOOD PRESSURE: 142 MMHG | WEIGHT: 182 LBS | DIASTOLIC BLOOD PRESSURE: 66 MMHG | BODY MASS INDEX: 29.25 KG/M2 | HEIGHT: 66 IN

## 2025-08-26 PROCEDURE — 99203 OFFICE O/P NEW LOW 30 MIN: CPT

## 2025-08-26 PROCEDURE — G2211 COMPLEX E/M VISIT ADD ON: CPT

## 2025-08-26 PROCEDURE — 93000 ELECTROCARDIOGRAM COMPLETE: CPT

## 2025-08-27 ENCOUNTER — NON-APPOINTMENT (OUTPATIENT)
Age: 75
End: 2025-08-27

## 2025-08-27 ENCOUNTER — APPOINTMENT (OUTPATIENT)
Dept: INTERNAL MEDICINE | Facility: CLINIC | Age: 75
End: 2025-08-27
Payer: MEDICARE

## 2025-08-27 DIAGNOSIS — R10.9 UNSPECIFIED ABDOMINAL PAIN: ICD-10-CM

## 2025-08-27 PROCEDURE — 99214 OFFICE O/P EST MOD 30 MIN: CPT | Mod: 2W

## 2025-08-27 PROCEDURE — G2211 COMPLEX E/M VISIT ADD ON: CPT | Mod: 2W

## 2025-08-29 ENCOUNTER — TRANSCRIPTION ENCOUNTER (OUTPATIENT)
Age: 75
End: 2025-08-29

## 2025-09-03 ENCOUNTER — TRANSCRIPTION ENCOUNTER (OUTPATIENT)
Age: 75
End: 2025-09-03

## 2025-09-08 ENCOUNTER — NON-APPOINTMENT (OUTPATIENT)
Age: 75
End: 2025-09-08

## 2025-09-08 ENCOUNTER — TRANSCRIPTION ENCOUNTER (OUTPATIENT)
Age: 75
End: 2025-09-08

## 2025-09-09 ENCOUNTER — NON-APPOINTMENT (OUTPATIENT)
Age: 75
End: 2025-09-09

## 2025-09-09 ENCOUNTER — TRANSCRIPTION ENCOUNTER (OUTPATIENT)
Age: 75
End: 2025-09-09

## 2025-09-10 ENCOUNTER — NON-APPOINTMENT (OUTPATIENT)
Age: 75
End: 2025-09-10

## 2025-09-11 ENCOUNTER — NON-APPOINTMENT (OUTPATIENT)
Age: 75
End: 2025-09-11

## 2025-09-11 ENCOUNTER — APPOINTMENT (OUTPATIENT)
Dept: OPHTHALMOLOGY | Facility: CLINIC | Age: 75
End: 2025-09-11
Payer: MEDICARE

## 2025-09-11 PROCEDURE — 92134 CPTRZ OPH DX IMG PST SGM RTA: CPT

## 2025-09-11 PROCEDURE — 92014 COMPRE OPH EXAM EST PT 1/>: CPT

## 2025-09-15 ENCOUNTER — TRANSCRIPTION ENCOUNTER (OUTPATIENT)
Age: 75
End: 2025-09-15

## 2025-09-16 ENCOUNTER — APPOINTMENT (OUTPATIENT)
Dept: INTERNAL MEDICINE | Facility: CLINIC | Age: 75
End: 2025-09-16

## 2025-09-16 ENCOUNTER — RX RENEWAL (OUTPATIENT)
Age: 75
End: 2025-09-16

## 2025-09-17 ENCOUNTER — APPOINTMENT (OUTPATIENT)
Dept: HEART AND VASCULAR | Facility: CLINIC | Age: 75
End: 2025-09-17
Payer: MEDICARE

## 2025-09-17 VITALS
SYSTOLIC BLOOD PRESSURE: 144 MMHG | HEART RATE: 57 BPM | BODY MASS INDEX: 29.25 KG/M2 | TEMPERATURE: 98.9 F | OXYGEN SATURATION: 97 % | DIASTOLIC BLOOD PRESSURE: 82 MMHG | WEIGHT: 181.99 LBS | HEIGHT: 66 IN

## 2025-09-17 DIAGNOSIS — K21.9 GASTRO-ESOPHAGEAL REFLUX DISEASE W/OUT ESOPHAGITIS: ICD-10-CM

## 2025-09-17 PROCEDURE — 99213 OFFICE O/P EST LOW 20 MIN: CPT | Mod: 25

## 2025-09-17 PROCEDURE — 93000 ELECTROCARDIOGRAM COMPLETE: CPT

## 2025-09-17 RX ORDER — PANTOPRAZOLE 40 MG/1
40 TABLET, DELAYED RELEASE ORAL
Qty: 90 | Refills: 1 | Status: ACTIVE | COMMUNITY
Start: 2025-09-17 | End: 1900-01-01

## (undated) DEVICE — SUT NYLON 10-0 12" CU-5

## (undated) DEVICE — PREP BETADINE 5% STERILE OPTHALMIC SOLUTION

## (undated) DEVICE — PACK CENTURION 2.4MM

## (undated) DEVICE — KNIFE ALCON SLIT INTREPID CLEAR-CUT SAFETY 2.4MM

## (undated) DEVICE — KIT CENTURION ANTERIOR

## (undated) DEVICE — GLV 7.5 PROTEXIS (WHITE)

## (undated) DEVICE — TIP OZIL 12 DEGREE MINI FLARE

## (undated) DEVICE — Device

## (undated) DEVICE — KNIFE ALCON CRESCENT ANGLED BEVEL UP 2.3MM (PINK)

## (undated) DEVICE — DRSG STERISTRIPS 0.5 X 4"

## (undated) DEVICE — PACK ANTERIOR SEGMENT

## (undated) DEVICE — FORCEP BIOPSY OVAL CUP DISP

## (undated) DEVICE — DRAPE MICROSCOPE KNOB COVER SMALL (2 PCS)

## (undated) DEVICE — TRANSFORMER INTREPID I/A 0.3MM

## (undated) DEVICE — NDL HYPO SAFE 27G X 5/8" (GRAY)

## (undated) DEVICE — ELCTR ERASER BI-P BVL 45DEG 18G

## (undated) DEVICE — ELCTR BIPOLAR CORD J&J 12FT DISP

## (undated) DEVICE — FORCEP RADIAL JAW PULMONARY BIOPSY 1.8MM OD 2MM DISP

## (undated) DEVICE — KNIFE ALCON STANDARD FULL HANDLE 15 DEG (PINK)